# Patient Record
Sex: FEMALE | Race: ASIAN | NOT HISPANIC OR LATINO | Employment: FULL TIME | URBAN - METROPOLITAN AREA
[De-identification: names, ages, dates, MRNs, and addresses within clinical notes are randomized per-mention and may not be internally consistent; named-entity substitution may affect disease eponyms.]

---

## 2017-03-16 ENCOUNTER — HOSPITAL ENCOUNTER (EMERGENCY)
Facility: CLINIC | Age: 47
Discharge: HOME | End: 2017-03-16
Attending: EMERGENCY MEDICINE | Admitting: EMERGENCY MEDICINE
Payer: COMMERCIAL

## 2017-03-16 ENCOUNTER — APPOINTMENT (OUTPATIENT)
Dept: GENERAL RADIOLOGY | Facility: CLINIC | Age: 47
End: 2017-03-16
Attending: EMERGENCY MEDICINE
Payer: COMMERCIAL

## 2017-03-16 VITALS
BODY MASS INDEX: 26.66 KG/M2 | RESPIRATION RATE: 18 BRPM | WEIGHT: 160 LBS | SYSTOLIC BLOOD PRESSURE: 173 MMHG | TEMPERATURE: 98.1 F | HEIGHT: 65 IN | OXYGEN SATURATION: 99 % | DIASTOLIC BLOOD PRESSURE: 105 MMHG

## 2017-03-16 DIAGNOSIS — S92.512A CLOSED DISPLACED FRACTURE OF PROXIMAL PHALANX OF LESSER TOE OF LEFT FOOT, INITIAL ENCOUNTER: ICD-10-CM

## 2017-03-16 PROCEDURE — 73660 X-RAY EXAM OF TOE(S): CPT | Mod: LT

## 2017-03-16 PROCEDURE — 28515 TREATMENT OF TOE FRACTURE: CPT

## 2017-03-16 PROCEDURE — 99284 EMERGENCY DEPT VISIT MOD MDM: CPT | Mod: 25

## 2017-03-16 RX ORDER — BUPIVACAINE HYDROCHLORIDE 5 MG/ML
INJECTION, SOLUTION PERINEURAL
Status: DISCONTINUED
Start: 2017-03-16 | End: 2017-03-16 | Stop reason: HOSPADM

## 2017-03-16 RX ORDER — HYDROCODONE BITARTRATE AND ACETAMINOPHEN 5; 325 MG/1; MG/1
1 TABLET ORAL EVERY 6 HOURS PRN
Qty: 10 TABLET | Refills: 0 | Status: SHIPPED | OUTPATIENT
Start: 2017-03-16 | End: 2017-05-25

## 2017-03-16 ASSESSMENT — ENCOUNTER SYMPTOMS: NUMBNESS: 1

## 2017-03-16 NOTE — LETTER
[facility] EMERGENCY DEPARTMENT  [address]  [facility] [address]  [phone]    2017    Corinna Barker  [address] [facility] [id number] [phone] (home) none (work)    : 1970      To Whom it may concern:    Corinna Barker was seen in our Emergency Department today, 2017.  I expect her condition to improve over the next several weeks.  She may return to work 3/18/17 but she may require light duty that allows her to be off her feet for the next 2 weeks.    Sincerely,        Meme Stover

## 2017-03-16 NOTE — ED NOTES
Patient educated on narcotic pain medicine, Norco, as well as follow-up with [facility] as needed. Educated to not drive or operate equipment while taking this medication. Patient educated that medication can make them drowsy or impaired. Educated that pain medications can cause addiction and that opioids can cause constipation, and to drink plenty of fluids and consume fiber. Patient received discharge instructions and has no other questions at this time.

## 2017-03-16 NOTE — ED AVS SNAPSHOT
[facility] Emergency Department    [address]    [facility] [address]    Phone:  [phone]    Fax:  [phone]                                       Corinna Barker   MRN: [id number]    Department:  [facility] Emergency Department   Date of Visit:  3/16/2017           Patient Information     Date Of Birth          1970        Your diagnoses for this visit were:     Closed displaced fracture of proximal phalanx of lesser toe of left foot, initial encounter        You were seen by Meme Stover MD.      Follow-up Information     [facility]    Why:  As needed, If symptoms worsen    Contact information:    [address]  [address]  [facility] [address] [phone]        Follow up with [facility] Emergency Department.    Specialty:  EMERGENCY MEDICINE    Why:  As needed    Contact information:    [address] [facility] [facility] [id number] [phone]        Discharge Instructions           Finger or Toe Fractures (Broken Finger or Toe)  A hard blow can break a bone in your toe or finger. Broken bones are also known as fractures. Even minor fractures need medical care. Without treatment, they may heal crooked, remain stiff, or develop other problems.    When to go to the Emergency Room (ER)  You may not always know when you have a fractured toe or finger. Apply ice to the injury right away. Then, seek medical care if:    Your finger or toe is swollen or very painful.    You cannot move your finger or toe normally.    Your injured toe or finger is pale or blue.    You are bleeding.    A bone protrudes through your skin.  What to expect in the ER  A healthcare provider will ask about your injury and examine your toe or finger. You may have X-rays. Treatment will depend on the type of fracture you have.  Toe fracture  Your healthcare provider may straighten a broken toe.  You'll be given local anesthesia so you won't feel any discomfort during this procedure. Your injured toe may then be splinted by being taped to a toe next to it, or placed on a pad that's taped to your foot. Your healthcare provider may also ask you to apply ice and keep your foot elevated.  Opioid Medication Information    You have been given a prescription for an opioid (narcotic) pain medicine and/or have received a pain medicine while here in the Emergency Department. These medicines can make you drowsy or impaired. You must not drive, operate dangerous equipment, or engage in any other dangerous activities while taking these medications. If you drive while taking these medications, you could be arrested for DUI, or driving under the influence. Do not drink any alcohol while you are taking these medications.   Opioid pain medications can cause addiction. If you have a history of chemical dependency of any type, you are at a higher risk of becoming addicted to pain medications.  Only take these prescribed medications to treat your pain when all other options have been tried. Take it for as short a time and as few doses as possible. Store your pain pills in a secure place, as they are frequently stolen and provide a dangerous opportunity for children or visitors in your house to start abusing these powerful medications. We will not replace any lost or stolen medicine.  As soon as your pain is better, you should flush all your remaining medication.   Many prescription pain medications contain Tylenol  (acetaminophen), including Vicodin , Tylenol #3 , Norco , Lortab , and Percocet .  You should not take any extra pills of Tylenol  if you are using these prescription medications or you can get very sick.  Do not ever take more than 4000 mg of acetaminophen in any 24 hour period.  All opioids tend to cause constipation. Drink plenty of water and eat foods that have a lot of fiber, such as fruits, vegetables, prune  juice, apple juice and high fiber cereal.  Take a laxative if you don t move your bowels at least every other day. Miralax , Milk of Magnesia, Colace , or Senna  can be used to keep you regular.            24 Hour Appointment Hotline       To make an appointment at any [facility], call [phone] ([phone]). If you don't have a family doctor or clinic, we will help you find one. [facility] are conveniently located to serve the needs of you and your family.             Review of your medicines      START taking        Dose / Directions Last dose taken    HYDROcodone-acetaminophen 5-325 MG per tablet   Commonly known as:  NORCO   Dose:  1 tablet   Quantity:  10 tablet        Take 1 tablet by mouth every 6 hours as needed for pain   Refills:  0                Prescriptions were sent or printed at these locations (1 Prescription)                   Other Prescriptions                Printed at Department/Unit printer (1 of 1)         HYDROcodone-acetaminophen (NORCO) 5-325 MG per tablet                Procedures and tests performed during your visit     XR Toe Left G/E 2 Views      Orders Needing Specimen Collection     None      Pending Results     No orders found from 3/14/2017 to 3/17/2017.            Pending Culture Results     No orders found from 3/14/2017 to 3/17/2017.             Test Results from your hospital stay     3/16/2017  7:45 AM - Interface, Radiant Ib      Narrative     LEFT TOE TWO OR MORE VIEWS  3/16/2017 7:04 AM     HISTORY: Kicked a box, pain to fourth toe.    COMPARISON: None.        Impression     IMPRESSION: Transverse fracture of the proximal shaft of the proximal  phalanx of the fourth toe with lateral deviation of the distal  fracture fragment.    NADER SIMMONS MD                Clinical Quality Measure: Blood Pressure Screening     Your blood pressure was checked while you were in the emergency department today. The last reading we obtained was  BP: (!) 157/93 . Please  "read the guidelines below about what these numbers mean and what you should do about them.  If your systolic blood pressure (the top number) is less than 120 and your diastolic blood pressure (the bottom number) is less than 80, then your blood pressure is normal. There is nothing more that you need to do about it.  If your systolic blood pressure (the top number) is 120-139 or your diastolic blood pressure (the bottom number) is 80-89, your blood pressure may be higher than it should be. You should have your blood pressure rechecked within a year by a primary care provider.  If your systolic blood pressure (the top number) is 140 or greater or your diastolic blood pressure (the bottom number) is 90 or greater, you may have high blood pressure. High blood pressure is treatable, but if left untreated over time it can put you at risk for heart attack, stroke, or kidney failure. You should have your blood pressure rechecked by a primary care provider within the next 4 weeks.  If your provider in the emergency department today gave you specific instructions to follow-up with your doctor or provider even sooner than that, you should follow that instruction and not wait for up to 4 weeks for your follow-up visit.        Thank you for choosing [facility]       Thank you for choosing [facility] for your care. Our goal is always to provide you with excellent care. Hearing back from our patients is one way we can continue to improve our services. Please take a few minutes to complete the written survey that you may receive in the mail after you visit with us. Thank you!        [employer] Information     [employer] lets you send messages to your doctor, view your test results, renew your prescriptions, schedule appointments and more. To sign up, go to [email or web address] . Click on \"Log in\" on the left side of the screen, which will take you to the Welcome page. Then click on \"Sign up Now\" on the right side of the page.     You " will be asked to enter the access code listed below, as well as some personal information. Please follow the directions to create your username and password.     Your access code is: [email or web address]  Expires: 2017  8:27 AM     Your access code will  in 90 days. If you need help or a new code, please call your [facility] clinic or [phone].        Care EveryWhere ID     This is your Care EveryWhere ID. This could be used by other organizations to access your [facility] medical records  [id number]        After Visit Summary       This is your record. Keep this with you and show to your community pharmacist(s) and doctor(s) at your next visit.

## 2017-03-16 NOTE — DISCHARGE INSTRUCTIONS
Finger or Toe Fractures (Broken Finger or Toe)  A hard blow can break a bone in your toe or finger. Broken bones are also known as fractures. Even minor fractures need medical care. Without treatment, they may heal crooked, remain stiff, or develop other problems.    When to go to the Emergency Room (ER)  You may not always know when you have a fractured toe or finger. Apply ice to the injury right away. Then, seek medical care if:    Your finger or toe is swollen or very painful.    You cannot move your finger or toe normally.    Your injured toe or finger is pale or blue.    You are bleeding.    A bone protrudes through your skin.  What to expect in the ER  A healthcare provider will ask about your injury and examine your toe or finger. You may have X-rays. Treatment will depend on the type of fracture you have.  Toe fracture  Your healthcare provider may straighten a broken toe. You'll be given local anesthesia so you won't feel any discomfort during this procedure. Your injured toe may then be splinted by being taped to a toe next to it, or placed on a pad that's taped to your foot. Your healthcare provider may also ask you to apply ice and keep your foot elevated.  Opioid Medication Information    You have been given a prescription for an opioid (narcotic) pain medicine and/or have received a pain medicine while here in the Emergency Department. These medicines can make you drowsy or impaired. You must not drive, operate dangerous equipment, or engage in any other dangerous activities while taking these medications. If you drive while taking these medications, you could be arrested for DUI, or driving under the influence. Do not drink any alcohol while you are taking these medications.   Opioid pain medications can cause addiction. If you have a history of chemical dependency of any type, you are at a higher risk of becoming addicted to pain medications.  Only take these prescribed medications to treat your  pain when all other options have been tried. Take it for as short a time and as few doses as possible. Store your pain pills in a secure place, as they are frequently stolen and provide a dangerous opportunity for children or visitors in your house to start abusing these powerful medications. We will not replace any lost or stolen medicine.  As soon as your pain is better, you should flush all your remaining medication.   Many prescription pain medications contain Tylenol  (acetaminophen), including Vicodin , Tylenol #3 , Norco , Lortab , and Percocet .  You should not take any extra pills of Tylenol  if you are using these prescription medications or you can get very sick.  Do not ever take more than 4000 mg of acetaminophen in any 24 hour period.  All opioids tend to cause constipation. Drink plenty of water and eat foods that have a lot of fiber, such as fruits, vegetables, prune juice, apple juice and high fiber cereal.  Take a laxative if you don t move your bowels at least every other day. Miralax , Milk of Magnesia, Colace , or Senna  can be used to keep you regular.

## 2017-03-16 NOTE — ED AVS SNAPSHOT
[facility] Emergency Department    [address]    [facility] [address]    Phone:  [phone]    Fax:  [phone]                                       Corinna Barker   MRN: [id number]    Department:  [facility] Emergency Department   Date of Visit:  3/16/2017           After Visit Summary Signature Page     I have received my discharge instructions, and my questions have been answered. I have discussed any challenges I see with this plan with the nurse or doctor.    ..........................................................................................................................................  Patient/Patient Representative Signature      ..........................................................................................................................................  Patient Representative Print Name and Relationship to Patient    ..................................................               ................................................  Date                                            Time    ..........................................................................................................................................  Reviewed by Signature/Title    ...................................................              ..............................................  Date                                                            Time

## 2017-03-16 NOTE — ED PROVIDER NOTES
"  History     Chief Complaint:  Toe Pain    HPI   Corinna Barker is a 46 year old female who presents with moderate left fourth toe pain and deformity after kicking a box about two hours ago. Pain is worse with walking. She endorses subjective numbness in the painful toe but denies any tingling.     Allergies:  The patient has no known drug allergies.     Medications:    The patient is currently on no regular medications.       Past Medical History:    History reviewed.  No significant past medical history.      Past Surgical History:    History reviewed.  No significant past surgical history.      Family History:    History reviewed.  No significant family history.      Social History:  Relationship status:   Tobacco use: Positive   Alcohol use: Positive  The patient presents alone.     Review of Systems   Musculoskeletal:        Positive for toe pain and deformity.    Neurological: Positive for numbness.        Negative for paresthesias.    All other systems reviewed and are negative.    Physical Exam   First Vitals:  BP: (!) 157/93  Heart Rate: 85  Temp: 98.1  F (36.7  C)  Resp: 18  Height: 165.1 cm (5' 5\")  Weight: 72.6 kg (160 lb)  SpO2: 96 %    Physical Exam  General/Appearance: appears stated age, well-groomed, appears comfortable  MSK: limited mobility of L 4th toe, L 4th toe bent laterally at PIP joint, no ttp to surrounding toes or metatarsals  Skin: warm and well-perfused, no rash, no edema, no ecchymosis, nl turgor  Neuro: nl sensation to 4th L toe    Emergency Department Course   Imaging:  Radiographic findings were communicated with the patient who voiced understanding of the findings.    Left Toes XR, per radiology:   Transverse fracture of the proximal shaft of the proximal phalanx of the fourth toe with lateral deviation of the distal fracture fragment.     Procedures:   Toe Fracture Reduction     LOCATION:  Left fourth toe  ANESTHESIA: Digital block using 0.5% Bupivacaine  PROCEDURE NOTE: " Axial traction with medial force was applied with visibly improved alignment. The patient's toe was then buddy-taped. The patient tolerated the procedure well and there were no complications.    Emergency Department Course:  Nursing notes and vitals reviewed.  I performed an exam of the patient as documented above.  The above workup was undertaken.  0742: I rechecked the patient and discussed results.  0751: I numbed the patient's toe via digital block.   0817: I reduced the patient's toe and it was subsequently buddy-taped.   The patient was provided an ortho shoe and crutches for comfort.   Findings and plan explained to the Patient. Patient discharged home, status improved, with instructions regarding supportive care, medications, and reasons to return as well as the importance of close follow-up was reviewed.    Impression & Plan    Medical Decision Making:  Corinna Barker is a 46 year old female who stubbed her left fourth toe today. X-ray shows a fracture with slight displacement and lateral deviation. The toe was blocked and using traction and medial force was put in better alignment. It was then buddy-taped. She will be given an ortho shoe and crutches to be used as needed. Of note, there is no significant neurovascular compromise.      Diagnosis:    ICD-10-CM   1. Closed displaced fracture of proximal phalanx of lesser toe of left foot, initial encounter S92.512A     Disposition:  Discharge to home with primary care follow up.     Discharge Medications:  New Prescriptions    HYDROCODONE-ACETAMINOPHEN (NORCO) 5-325 MG PER TABLET    Take 1 tablet by mouth every 6 hours as needed for pain       Sally ASHLEY, agustín serving as a scribe on 3/16/2017 at 6:42 AM to personally document services performed by Meme Stover MD, based on my observations and the provider's statements to me.    [facility] EMERGENCY DEPARTMENT     Mmee Stover MD  03/16/17 0936

## 2017-03-16 NOTE — ED NOTES
MD made aware of blood pressure 173/105. Per MD, will consult patient to follow-up with elevated blood pressure. Ready for discharge.

## 2017-03-16 NOTE — ED NOTES
IN TRIAGE airway,breathing and circulation intact, without need for intervention . Alert and interacting appropriately for age and situation. 0430  Kicked box and left 4 toe very painful

## 2017-05-25 ENCOUNTER — OFFICE VISIT (OUTPATIENT)
Dept: FAMILY MEDICINE | Facility: CLINIC | Age: 47
End: 2017-05-25
Payer: COMMERCIAL

## 2017-05-25 VITALS
DIASTOLIC BLOOD PRESSURE: 100 MMHG | OXYGEN SATURATION: 99 % | BODY MASS INDEX: 27.77 KG/M2 | HEART RATE: 78 BPM | SYSTOLIC BLOOD PRESSURE: 142 MMHG | TEMPERATURE: 98.1 F | WEIGHT: 166.9 LBS

## 2017-05-25 DIAGNOSIS — I10 ESSENTIAL HYPERTENSION: ICD-10-CM

## 2017-05-25 DIAGNOSIS — H81.12 BPPV (BENIGN PAROXYSMAL POSITIONAL VERTIGO), LEFT: Primary | ICD-10-CM

## 2017-05-25 LAB
ANION GAP SERPL CALCULATED.3IONS-SCNC: 6 MMOL/L (ref 3–14)
BUN SERPL-MCNC: 14 MG/DL (ref 7–30)
CALCIUM SERPL-MCNC: 8.9 MG/DL (ref 8.5–10.1)
CHLORIDE SERPL-SCNC: 107 MMOL/L (ref 94–109)
CO2 SERPL-SCNC: 27 MMOL/L (ref 20–32)
CREAT SERPL-MCNC: 0.58 MG/DL (ref 0.52–1.04)
GFR SERPL CREATININE-BSD FRML MDRD: ABNORMAL ML/MIN/1.7M2
GLUCOSE SERPL-MCNC: 102 MG/DL (ref 70–99)
POTASSIUM SERPL-SCNC: 4.1 MMOL/L (ref 3.4–5.3)
SODIUM SERPL-SCNC: 140 MMOL/L (ref 133–144)

## 2017-05-25 PROCEDURE — 80048 BASIC METABOLIC PNL TOTAL CA: CPT | Performed by: NURSE PRACTITIONER

## 2017-05-25 PROCEDURE — 99203 OFFICE O/P NEW LOW 30 MIN: CPT | Performed by: NURSE PRACTITIONER

## 2017-05-25 PROCEDURE — 36415 COLL VENOUS BLD VENIPUNCTURE: CPT | Performed by: NURSE PRACTITIONER

## 2017-05-25 RX ORDER — HYDROCHLOROTHIAZIDE 12.5 MG/1
12.5 TABLET ORAL DAILY
Qty: 30 TABLET | Refills: 1 | Status: SHIPPED | OUTPATIENT
Start: 2017-05-25 | End: 2017-07-10

## 2017-05-25 NOTE — PATIENT INSTRUCTIONS
Benign Paroxysmal Positional Vertigo  Benign paroxysmal positional vertigo (BPPV) is a problem with the inner ear. The inner ear contains the vestibular system. This system is what helps you keep your balance. BPPV causes a feeling of spinning. It is a common problem of the vestibular system.  Understanding the vestibular system  The vestibular system of the ear is made up of very tiny parts. They include the utricle, saccule, and semicircular canals. The utricle is a tiny organ that contains calcium crystals. In some people, the crystals can move into the semicircular canals. When this happens, the system no longer works as it should. This causes BPPV. Benign means it is not life-threatening. Paroxysmal means it happens suddenly. Positional means that it happens when you move your head. Vertigo is a feeling of spinning.  What causes BPPV?  Causes include injury to your head or neck. Other problems with the vestibular system may cause BPPV. In many people, the cause of BPPV is not known.  Symptoms of BPPV  You many have repeated feelings of spinning (vertigo). The vertigo usually lasts less than 1 minute. Some movements, suchas rolling over in bed, can bring on vertigo.  Diagnosing BPPV  Your primary health care provider may diagnose and treat your BPPV. Or you may see an ear, nose, and throat doctor (otolaryngologist). In some cases, you may see a nervous system doctor (neurologist).  The health care provider will ask about your symptoms and your medical history. He or she will examine you. You may have hearing and balance tests. As part of the exam, your health care provider may have you move your head and body in certain ways. If you have BPPV, the movements can bring on vertigo. Your provider will also look for abnormal movements of your eyes. You may have other tests to check your vestibular or nervous systems.  Treatment for BPPV  Your health care provider may try to move the calcium crystals. This is done  by having you move your head and neck in certain ways. This treatment is safe and often works well. You may also be told to do these movements at home. You may still have vertigo for a few weeks. Your health care provider will recheck your symptoms, usually in about a month. Special physical therapy may also be part of treatment.  In rare cases surgery may be needed for BPPV that does not go away.     When to call the health care provider  Call your health care provider right away if you have any of these:    Symptoms that do not go away with treatment    Symptoms that get worse    New symptoms        2655-1296 [employer]. [address] [place] [id number]. All rights reserved. This information is not intended as a substitute for professional medical care. Always follow your healthcare professional's instructions.    Start the medication for blood pressure now.  Return to clinic in 1 month for a lab draw and blood pressure recheck with the nurse.    Schedule an appointment with the dizziness and balance center.

## 2017-05-25 NOTE — MR AVS SNAPSHOT
After Visit Summary   5/25/2017    Corinna Barker    MRN: [id number]           Patient Information     Date Of Birth          1970        Visit Information        Provider Department      5/25/2017 8:00 AM Shannan Farias Ra, APRN [facility]        Today's Diagnoses     Essential hypertension    -  1    BPPV (benign paroxysmal positional vertigo), left          Care Instructions      Benign Paroxysmal Positional Vertigo  Benign paroxysmal positional vertigo (BPPV) is a problem with the inner ear. The inner ear contains the vestibular system. This system is what helps you keep your balance. BPPV causes a feeling of spinning. It is a common problem of the vestibular system.  Understanding the vestibular system  The vestibular system of the ear is made up of very tiny parts. They include the utricle, saccule, and semicircular canals. The utricle is a tiny organ that contains calcium crystals. In some people, the crystals can move into the semicircular canals. When this happens, the system no longer works as it should. This causes BPPV. Benign means it is not life-threatening. Paroxysmal means it happens suddenly. Positional means that it happens when you move your head. Vertigo is a feeling of spinning.  What causes BPPV?  Causes include injury to your head or neck. Other problems with the vestibular system may cause BPPV. In many people, the cause of BPPV is not known.  Symptoms of BPPV  You many have repeated feelings of spinning (vertigo). The vertigo usually lasts less than 1 minute. Some movements, suchas rolling over in bed, can bring on vertigo.  Diagnosing BPPV  Your primary health care provider may diagnose and treat your BPPV. Or you may see an ear, nose, and throat doctor (otolaryngologist). In some cases, you may see a nervous system doctor (neurologist).  The health care provider will ask about your symptoms and your medical history. He or she will examine you. You  may have hearing and balance tests. As part of the exam, your health care provider may have you move your head and body in certain ways. If you have BPPV, the movements can bring on vertigo. Your provider will also look for abnormal movements of your eyes. You may have other tests to check your vestibular or nervous systems.  Treatment for BPPV  Your health care provider may try to move the calcium crystals. This is done by having you move your head and neck in certain ways. This treatment is safe and often works well. You may also be told to do these movements at home. You may still have vertigo for a few weeks. Your health care provider will recheck your symptoms, usually in about a month. Special physical therapy may also be part of treatment.  In rare cases surgery may be needed for BPPV that does not go away.     When to call the health care provider  Call your health care provider right away if you have any of these:    Symptoms that do not go away with treatment    Symptoms that get worse    New symptoms        [id number] [email or web address]. [address] [place] [address]. All rights reserved. This information is not intended as a substitute for professional medical care. Always follow your healthcare professional's instructions.    Start the medication for blood pressure now.  Return to clinic in 1 month for a lab draw and blood pressure recheck with the nurse.    Schedule an appointment with the dizziness and balance center.                Follow-ups after your visit        Additional Services     PHYSICAL THERAPY REFERRAL       *This therapy referral will be filtered to a centralized scheduling office at [facility] and the patient will receive a call to schedule an appointment at a [place] location most convenient for them. *     [facility] provides Physical Therapy evaluation and treatment and many specialty services across the [facility].  " If requesting a specialty program, please choose from the list below.    If you have not heard from the scheduling office within 2 business days, please call [phone] for all locations, with the exception of [place], please call [phone].  Treatment: Evaluation & Treatment  Special Instructions/Modalities:   Special Programs: Balance/Vestibular    Please be aware that coverage of these services is subject to the terms and limitations of your health insurance plan.  Call member services at your health plan with any benefit or coverage questions.      **Note to Provider:  If you are referring outside of [place] for the therapy appointment, please list the name of the location in the \"special instructions\" above, print the referral and give to the patient to schedule the appointment.                  Future tests that were ordered for you today     Open Future Orders        Priority Expected Expires Ordered    Basic metabolic panel Routine  5/25/2018 5/25/2017            Who to contact     If you have questions or need follow up information about today's clinic visit or your schedule please contact [facility] directly at [phone].  Normal or non-critical lab and imaging results will be communicated to you by MyChart, letter or phone within 4 business days after the clinic has received the results. If you do not hear from us within 7 days, please contact the clinic through [employer] or phone. If you have a critical or abnormal lab result, we will notify you by phone as soon as possible.  Submit refill requests through [employer] or call your pharmacy and they will forward the refill request to us. Please allow 3 business days for your refill to be completed.          Additional Information About Your Visit        [employer] Information     [employer] lets you send messages to your doctor, view your test results, renew your prescriptions, schedule appointments and more. To sign up, go to " "[email or web address] . Click on \"Log in\" on the left side of the screen, which will take you to the Welcome page. Then click on \"Sign up Now\" on the right side of the page.     You will be asked to enter the access code listed below, as well as some personal information. Please follow the directions to create your username and password.     Your access code is: [email or web address]  Expires: 2017  8:27 AM     Your access code will  in 90 days. If you need help or a new code, please call your [place] clinic or [phone].        Care EveryWhere ID     This is your Care EveryWhere ID. This could be used by other organizations to access your [place] medical records  [id number]        Your Vitals Were     Pulse Temperature Pulse Oximetry BMI (Body Mass Index)          78 98.1  F (36.7  C) (Oral) 99% 27.77 kg/m2         Blood Pressure from Last 3 Encounters:   17 (!) 142/100   17 (!) 173/105    Weight from Last 3 Encounters:   17 166 lb 14.4 oz (75.7 kg)   17 160 lb (72.6 kg)              We Performed the Following     Basic metabolic panel     PHYSICAL THERAPY REFERRAL          Today's Medication Changes          These changes are accurate as of: 17  8:32 AM.  If you have any questions, ask your nurse or doctor.               Start taking these medicines.        Dose/Directions    hydrochlorothiazide 12.5 MG Tabs tablet   Used for:  Essential hypertension   Started by:  Shannan Farias Ra, APRN CNP        Dose:  12.5 mg   Take 1 tablet (12.5 mg) by mouth daily   Quantity:  30 tablet   Refills:  1            Where to get your medicines      These medications were sent to [facility]/pharmacy #[id number] - [place] - [address]  [address] [place] [address]     Phone:  [phone]     hydrochlorothiazide 12.5 MG Tabs tablet                Primary Care Provider    Physician No Ref-Primary       No address on file        Thank you!     Thank you for choosing [facility] " [facility]  for your care. Our goal is always to provide you with excellent care. Hearing back from our patients is one way we can continue to improve our services. Please take a few minutes to complete the written survey that you may receive in the mail after your visit with us. Thank you!             Your Updated Medication List - Protect others around you: Learn how to safely use, store and throw away your medicines at www.disposemymeds.org.          This list is accurate as of: 5/25/17  8:32 AM.  Always use your most recent med list.                   Brand Name Dispense Instructions for use    hydrochlorothiazide 12.5 MG Tabs tablet     30 tablet    Take 1 tablet (12.5 mg) by mouth daily

## 2017-05-25 NOTE — LETTER
[facility]  [address] [address] [phone]          May 26, 2017    Corinna Barker                                                                                                                     [id number]  [id number]            Dear Corinna,    The result of your initial lab test looks good.  Nothing abnormal.  We can continue with the plan to recheck in 1 month.  Please let me know if you have any questions or concerns.    Thank you,    Sincerely,         Shannan LOPEZ

## 2017-05-25 NOTE — PROGRESS NOTES
SUBJECTIVE:                                                    Corinna Barker is a 46 year old female who presents to clinic today for the following health issues:      Dizziness      Duration: 5 days    Description   Feeling faint:  no   Feeling like the surroundings are moving: YES  Loss of consciousness or falls: no     Intensity:  moderate    Accompanying signs and symptoms:   Nausea/vomitting: YES  Headaches: Yes  Palpitations: no   Weakness in arms or legs: no   Vision or speech changes: no   Ringing in ears (Tinnitus): no   Hearing loss related to dizziness: no   Other (fevers/chills/sweating/dyspnea): no     History (similar episodes/head trauma/previous evaluation/recent bleeding): None    Precipitating or alleviating factors (new meds/chemicals): None  Worse with activity/head movement: YES- Head feels heavy    Therapies tried and outcome: None     Symptoms started 5 days ago.  Occurs when she changes positions, side to side or bending over.  Room doesn't spin but it does quiver.  Lasts for about 20 seconds.  Seems a bit better today but still foggy.    No fevers.  No head trauma.  No home meds.  No URI symptoms.  No chest pain, palpitations, sob.  Normal intake.  Normal output.    She smokes cigarettes.  Occasional etoh use.  No exercise.    She was treated about 5 years ago for htn, took med for about 3 years then stopped independently.  No monitoring since that time.    Problem list and histories reviewed & adjusted, as indicated.  Additional history: as documented    There is no problem list on file for this patient.    History reviewed. No pertinent surgical history.    Social History   Substance Use Topics     Smoking status: Current Every Day Smoker     Types: Cigarettes     Smokeless tobacco: Not on file      Comment: 1 pack a week     Alcohol use Yes     Family History   Problem Relation Age of Onset     Adopted: Yes           Reviewed and updated as needed this visit by clinical  staff  Tobacco  Allergies  Meds  Med Hx  Surg Hx  Fam Hx  Soc Hx      Reviewed and updated as needed this visit by Provider         ROS:  SEE HPI.    OBJECTIVE:                                                    BP (!) 142/100  Pulse 78  Temp 98.1  F (36.7  C) (Oral)  Wt 166 lb 14.4 oz (75.7 kg)  SpO2 99%  BMI 27.77 kg/m2  Body mass index is 27.77 kg/(m^2).  GENERAL: healthy, alert and no distress  EYES: Eyes grossly normal to inspection, PERRL and conjunctivae and sclerae normal  HENT: ear canals and TM's normal, nose and mouth without ulcers or lesions  NECK: no adenopathy, no asymmetry, masses, or scars and thyroid normal to palpation  RESP: lungs clear to auscultation - no rales, rhonchi or wheezes  BREAST: normal without masses, tenderness or nipple discharge and no palpable axillary masses or adenopathy  CV: regular rate and rhythm, normal S1 S2, no S3 or S4, no murmur, click or rub, no peripheral edema and peripheral pulses strong  ABDOMEN: soft, nontender, no hepatosplenomegaly, no masses and bowel sounds normal  MS: no gross musculoskeletal defects noted, no edema  SKIN: no suspicious lesions or rashes  NEURO: Normal strength and tone, sensory exam grossly normal, mentation intact, cranial nerves 2-12 intact and rapid alternating movements normal  PSYCH: mentation appears normal, affect normal/bright    Diagnostic Test Results:  none      ASSESSMENT/PLAN:                                                    1. BPPV (benign paroxysmal positional vertigo), left  46 y.o. Female, recent onset dizziness, seem to be improving slightly.  C/w BPPV.  Monitor, see PT for repositioning maneuvers.  Pt agrees with plan and verbalized understanding.  - PHYSICAL THERAPY REFERRAL    2. Essential hypertension  Asymptomatic.  Restart med.  Return to clinic in 1 month for nurse bp check and lab only.  Pt agrees with plan and verbalized understanding.  - hydrochlorothiazide 12.5 MG TABS tablet; Take 1 tablet (12.5 mg)  by mouth daily  Dispense: 30 tablet; Refill: 1  - Basic metabolic panel  - Basic metabolic panel; Future    RICHARD Marks Ra, CNP  [facility]

## 2017-05-25 NOTE — NURSING NOTE
"Chief Complaint   Patient presents with     Hypertension       Initial BP (!) 142/100  Pulse 78  Temp 98.1  F (36.7  C) (Oral)  Wt 166 lb 14.4 oz (75.7 kg)  SpO2 99%  BMI 27.77 kg/m2 Estimated body mass index is 27.77 kg/(m^2) as calculated from the following:    Height as of 3/16/17: 5' 5\" (1.651 m).    Weight as of this encounter: 166 lb 14.4 oz (75.7 kg).  Medication Reconciliation: complete   Renita SHIRLEY M.A.      "

## 2017-06-05 ENCOUNTER — HOSPITAL ENCOUNTER (OUTPATIENT)
Dept: PHYSICAL THERAPY | Facility: CLINIC | Age: 47
Setting detail: THERAPIES SERIES
End: 2017-06-05
Attending: NURSE PRACTITIONER
Payer: COMMERCIAL

## 2017-06-05 PROCEDURE — 40000840 ZZHC STATISTIC PT VESTIBULAR VISIT: Performed by: PHYSICAL THERAPIST

## 2017-06-05 PROCEDURE — 95992 CANALITH REPOSITIONING PROC: CPT | Mod: GP | Performed by: PHYSICAL THERAPIST

## 2017-06-05 PROCEDURE — 97161 PT EVAL LOW COMPLEX 20 MIN: CPT | Mod: GP | Performed by: PHYSICAL THERAPIST

## 2017-06-05 PROCEDURE — 97112 NEUROMUSCULAR REEDUCATION: CPT | Mod: GP | Performed by: PHYSICAL THERAPIST

## 2017-06-05 NOTE — PROGRESS NOTES
06/05/17 1500   Quick Adds   Type of Visit Initial OP PT Evaluation   General Information   Start of Care Date 06/05/17   Referring Physician RICHARD Marks, CNP   Orders Evaluate and Treat as Indicated   Order Date 05/25/17   Medical Diagnosis BPPV   Onset of illness/injury or Date of Surgery 05/20/17   Precautions/Limitations no known precautions/limitations   Surgical/Medical history reviewed Yes   Pertinent history of current problem (include personal factors and/or comorbidities that impact the POC) Pt presents to PT to address symptoms of intermittent vertigo that first began on 5/20/17 when she was bending forward to place a towel over her hair after getting out of the shower.  She has had several transient bouts of vertigo since then, especially when she lies in bed and rolls to her L side.  She denies any head trauma, recent illness, no hearing loss, did have L ear tinnitus that lasted several days but now resolved, no change in vision, no pressure in the ears.  No previous hx of vertigo reported.  Denies any other neurological changes.   Pertinent Visual History  wears corrective glasses   Prior level of function comment indep and active PLOF.  Pt works in a machine shop.   Patient role/Employment history Employed   Living environment [place]   Home/Community Accessibility Comments Pt lives in a multi-level home, no issues on stairs reported   Assistive Devices Comments no AD use   Patient/Family Goals Statement Resolve dizziness   Fall Risk Screen   Fall screen completed by PT   Per patient - Fall 2 or more times in past year? No   Per patient - Fall with injury in past year? No   Is patient a fall risk? No   System Outcome Measures   Outcome Measures BPPV   Dizziness Handicap Inventory (score out of 100) A decrease in score by 17.18 or greater indicates a clinically significant change in symptoms. 16   Was BPPV resolved at end of session? No   Pain   Patient currently in pain No    Cognitive Status Examination   Orientation orientation to person, place and time   Integumentary   Integumentary No deficits were identified   Posture   Posture Forward head position   Range of Motion (ROM)   ROM Comment WNL BUEs BLEs and C-spine   Strength   Strength Comments WFL and symmetrical with UE and LE myotomal screen   Bed Mobility   Bed Mobility Comments Indep, pt endorses vertigo with lying supine and head turned L   Transfer Skills   Transfer Comments Indep, steady on feet, no AD   Gait   Gait Comments INdep, steady gait, no AD.  Normal gait speed, reciprocal gait pattern.  Good ability to change speeds and navigate around obstacles.   Balance   Balance Comments Good functional balance, able to navigate in busy environments with gait skills.  No difficulty with NBOS or EC conditions.   Balance Special Tests   Balance Special Tests Modified CTSIB Conditions   Balance Special Tests Modified CTSIB Conditions   Condition 1, seconds 30 Seconds   Condition 2, seconds 30 Seconds   Condition 4, seconds 30 Seconds   Condition 5, seconds 30 Seconds   Sensory Examination   Sensory Perception no deficits were identified   Coordination   Coordination no deficits were identified   Oculomotor Exam   Smooth Pursuit Normal   Saccades Normal   VOR Normal   Rapid Head Thrust Normal   Convergence Testing Normal   Infrared Goggle Exam or Frenzel Lense Exam   Vestibular Suppressant in Last 24 Hours? No   Exam completed with Room Light  (IR goggles stopped functioning during eval)   Spontaneous Nystagmus Negative   Gaze Evoked Nystagmus Negative   Head Shake Horizontal Nystagmus Negative   [place]-Hallpike (right) Negative   [place]-Hallpike (Left) Upbeating L torsional   HSCC Supine Roll Test (Right) Negative   HSCC Supine Roll Test (Left) Negative   HSCC Supine Roll Test (Left) Comments  no nystagmus noted, but pt endorses mild vertigo   BPPV Canal(s) L Posterior   BPPV Type Canalithasis   Planned Therapy Interventions   Planned  Therapy Interventions neuromuscular re-education;gait training;other (see comments)  (CRM and VOR training as indicated)   Clinical Impression   Criteria for Skilled Therapeutic Interventions Met yes, treatment indicated   PT Diagnosis L posterior canal BPPV   Influenced by the following impairments Intermittent vertigo   Functional limitations due to impairments Impaired tolerance with rolling supine <> sidelying in bed, transitions supine <> sit, impaired ability to look up overhead and bend over to floor   Clinical Presentation Stable/Uncomplicated   Clinical Presentation Rationale classic BPPV, indep PLOF, intensity of symptoms   Clinical Decision Making (Complexity) Low complexity   Therapy Frequency other (see comments)  (2x/wk x 1 wk due to pt's schedule, then 1x/wk)   Predicted Duration of Therapy Intervention (days/wks) 4 wks   Risk & Benefits of therapy have been explained Yes   Patient, Family & other staff in agreement with plan of care Yes   Clinical Impression Comments Classic BPPV presentation, anticipate good improvement in symptoms with skilled intervention for CRM.   Education Assessment   Barriers to Learning No barriers   GOALS   PT Eval Goals 1;2;3   Goal 1   Goal Identifier IR Exam   Goal Description Pt will demonstrate negative s/s of BPPV with IR exam to indicate resolution of current BPPV episode and resume of PLOF.   Target Date 07/07/17   Goal 2   Goal Identifier DHI   Goal Description Pt will score <5/100 on the DHI to indicate improved dizziness symptoms and minimal/no impact on daily activities at home and with work (baseline score 16/100).   Target Date 07/07/17   Goal 3   Goal Identifier HEP   Goal Description Pt will verbalize/demonstrate understanding of self CRM if needed for future recurrence of BPPV symptoms in order to try managing symptoms at home before needing clinical intervention.   Target Date 07/07/17   Total Evaluation Time   Total Evaluation Time (Minutes) 30

## 2017-06-07 ENCOUNTER — HOSPITAL ENCOUNTER (OUTPATIENT)
Dept: PHYSICAL THERAPY | Facility: CLINIC | Age: 47
Setting detail: THERAPIES SERIES
End: 2017-06-07
Attending: NURSE PRACTITIONER
Payer: COMMERCIAL

## 2017-06-07 PROCEDURE — 40000840 ZZHC STATISTIC PT VESTIBULAR VISIT: Performed by: PHYSICAL THERAPIST

## 2017-06-07 PROCEDURE — 97112 NEUROMUSCULAR REEDUCATION: CPT | Mod: GP | Performed by: PHYSICAL THERAPIST

## 2017-06-07 PROCEDURE — 95992 CANALITH REPOSITIONING PROC: CPT | Mod: GP | Performed by: PHYSICAL THERAPIST

## 2017-06-26 DIAGNOSIS — I10 ESSENTIAL HYPERTENSION: ICD-10-CM

## 2017-06-26 PROCEDURE — 80048 BASIC METABOLIC PNL TOTAL CA: CPT | Performed by: NURSE PRACTITIONER

## 2017-06-26 PROCEDURE — 36415 COLL VENOUS BLD VENIPUNCTURE: CPT | Performed by: NURSE PRACTITIONER

## 2017-06-27 LAB
ANION GAP SERPL CALCULATED.3IONS-SCNC: 6 MMOL/L (ref 3–14)
BUN SERPL-MCNC: 7 MG/DL (ref 7–30)
CALCIUM SERPL-MCNC: 9.2 MG/DL (ref 8.5–10.1)
CHLORIDE SERPL-SCNC: 102 MMOL/L (ref 94–109)
CO2 SERPL-SCNC: 29 MMOL/L (ref 20–32)
CREAT SERPL-MCNC: 0.53 MG/DL (ref 0.52–1.04)
GFR SERPL CREATININE-BSD FRML MDRD: ABNORMAL ML/MIN/1.7M2
GLUCOSE SERPL-MCNC: 103 MG/DL (ref 70–99)
POTASSIUM SERPL-SCNC: 3.5 MMOL/L (ref 3.4–5.3)
SODIUM SERPL-SCNC: 137 MMOL/L (ref 133–144)

## 2017-07-10 ENCOUNTER — ALLIED HEALTH/NURSE VISIT (OUTPATIENT)
Dept: NURSING | Facility: CLINIC | Age: 47
End: 2017-07-10
Payer: COMMERCIAL

## 2017-07-10 VITALS — SYSTOLIC BLOOD PRESSURE: 144 MMHG | DIASTOLIC BLOOD PRESSURE: 86 MMHG | HEART RATE: 82 BPM

## 2017-07-10 DIAGNOSIS — I10 ESSENTIAL HYPERTENSION: ICD-10-CM

## 2017-07-10 PROCEDURE — 99207 ZZC NO CHARGE NURSE ONLY: CPT

## 2017-07-10 RX ORDER — HYDROCHLOROTHIAZIDE 12.5 MG/1
25 TABLET ORAL DAILY
Qty: 30 TABLET | Refills: 1 | COMMUNITY
Start: 2017-07-10 | End: 2017-07-13

## 2017-07-10 NOTE — PROGRESS NOTES
In for blood pressure check.    Today's reading: /82  Pulse 82     History   Smoking Status     Current Every Day Smoker     Types: Cigarettes   Smokeless Tobacco     Not on file     Comment: 1 pack a week       Current Outpatient Prescriptions   Medication Sig     hydrochlorothiazide 12.5 MG TABS tablet Take 1 tablet (12.5 mg) by mouth daily     No current facility-administered medications for this visit.         She is having her blood pressure monitored because it has been mildly elevated and has been on medication.    Corinna has had the following symptoms: none      HUDDLED WITH CRISTINA ODONNELL AND ADVISED PT TO INCREASE HER DOSE TO 2 TABS DAILY AND FOLLOW UP IN 2 WEEKS. WILL HAVE LAB AND FOLLOW UP WITH CRISTINA.  PT DOES NOT NEED TO BE FASTING.       PT EXPRESSED UNDERSTANDING AND WILL COUNT HER PILLS TO MAKE SURE SHE HAS ENOUGH LEFT AND WILL LET US KNOW IF SHE IS GOING TO BE SHORT SO WE CAN SEND IN EXTRA MEDICATION IF NEEDED.

## 2017-07-10 NOTE — MR AVS SNAPSHOT
"              After Visit Summary   7/10/2017    Corinna Barker    MRN: [id number]           Patient Information     Date Of Birth          1970        Visit Information        Provider Department      7/10/2017 8:30 AM  NURSE [facility]         Follow-ups after your visit        Your next 10 appointments already scheduled     2017  2:40 PM CDT   [facility]ARD [facility], C[facility]    [address] [facility] [address] [address] [phone]              Who to contact     If you have questions or need follow up information about today's clinic visit or your schedule please contact [facility] directly at [phone].  Normal or non-critical lab and imaging results will be communicated to you by MyChart, letter or phone within 4 business days after the clinic has received the results. If you do not hear from us within 7 days, please contact the clinic through MyChart or phone. If you have a critical or abnormal lab result, we will notify you by phone as soon as possible.  Submit refill requests through Eachpal or call your pharmacy and they will forward the refill request to us. Please allow 3 business days for your refill to be completed.          Additional Information About Your Visit        [email or web address] Information     Eachpal lets you send messages to your doctor, view your test results, renew your prescriptions, schedule appointments and more. To sign up, go to [email or web address] . Click on \"Log in\" on the left side of the screen, which will take you to the Welcome page. Then click on \"Sign up Now\" on the right side of the page.     You will be asked to enter the access code listed below, as well as some personal information. Please follow the directions to create your username and password.     Your access code is: [email or web address]  Expires: 10/8/2017  8:40 AM     Your access code will  in 90 " days. If you need help or a new code, please call your [place] clinic or [phone].        Care EveryWhere ID     This is your Care EveryWhere ID. This could be used by other organizations to access your [place] medical records  [id number]        Your Vitals Were     Pulse                   82            Blood Pressure from Last 3 Encounters:   07/10/17 144/86   05/25/17 (!) 142/100   03/16/17 (!) 173/105    Weight from Last 3 Encounters:   05/25/17 166 lb 14.4 oz (75.7 kg)   03/16/17 160 lb (72.6 kg)              Today, you had the following     No orders found for display       Primary Care Provider    Physician No Ref-Primary       No address on file        Equal Access to Services     RAFAEL LERMA : Hadii bruce doyleo Padmini, waaxda luqadaha, qaybta kaalmada adeclarayada, hoda de la torre . So [facility] [phone].    ATENCIÓN: Si habla español, tiene a darby disposición servicios gratuitos de asistencia lingüística. Llame al [phone].    We comply with applicable federal civil rights laws and [place] laws. We do not discriminate on the basis of race, color, national origin, age, disability sex, sexual orientation or gender identity.            Thank you!     Thank you for choosing [facility]  for your care. Our goal is always to provide you with excellent care. Hearing back from our patients is one way we can continue to improve our services. Please take a few minutes to complete the written survey that you may receive in the mail after your visit with us. Thank you!             Your Updated Medication List - Protect others around you: Learn how to safely use, store and throw away your medicines at www.disposemymeds.org.          This list is accurate as of: 7/10/17  8:40 AM.  Always use your most recent med list.                   Brand Name Dispense Instructions for use Diagnosis    hydrochlorothiazide 12.5 MG Tabs tablet     30 tablet    Take 1 tablet (12.5 mg)  by mouth daily    Essential hypertension

## 2017-07-10 NOTE — NURSING NOTE
"Chief Complaint   Patient presents with     Hypertension     was placed on medication about 6 weeks ago       Initial /82  Pulse 82 Estimated body mass index is 27.77 kg/(m^2) as calculated from the following:    Height as of 3/16/17: 5' 5\" (1.651 m).    Weight as of 5/25/17: 166 lb 14.4 oz (75.7 kg).  Medication Reconciliation: complete    "

## 2017-07-13 ENCOUNTER — TELEPHONE (OUTPATIENT)
Dept: FAMILY MEDICINE | Facility: CLINIC | Age: 47
End: 2017-07-13

## 2017-07-13 DIAGNOSIS — I10 ESSENTIAL HYPERTENSION: ICD-10-CM

## 2017-07-13 RX ORDER — HYDROCHLOROTHIAZIDE 12.5 MG/1
25 TABLET ORAL DAILY
Qty: 60 TABLET | Refills: 0 | Status: SHIPPED | OUTPATIENT
Start: 2017-07-13 | End: 2017-07-24

## 2017-07-13 NOTE — TELEPHONE ENCOUNTER
Patient called stating would be short of HCTZ before scheduled appt. Verbal ok to send prescription to pharmacy per YURY.    Urmila Beckett RN

## 2017-07-19 DIAGNOSIS — I10 ESSENTIAL HYPERTENSION: ICD-10-CM

## 2017-07-19 NOTE — TELEPHONE ENCOUNTER
DUPLICATE.     hydrochlorothiazide 12.5 MG TABS tablet WAS FILLED ON 7/13/2017, QTY 60 WITH 0 REFILL.

## 2017-07-20 RX ORDER — HYDROCHLOROTHIAZIDE 12.5 MG/1
TABLET ORAL
Qty: 30 TABLET | Refills: 1
Start: 2017-07-20

## 2017-07-24 ENCOUNTER — OFFICE VISIT (OUTPATIENT)
Dept: FAMILY MEDICINE | Facility: CLINIC | Age: 47
End: 2017-07-24
Payer: COMMERCIAL

## 2017-07-24 VITALS
TEMPERATURE: 98.6 F | WEIGHT: 167.6 LBS | HEART RATE: 99 BPM | BODY MASS INDEX: 27.89 KG/M2 | OXYGEN SATURATION: 98 % | DIASTOLIC BLOOD PRESSURE: 100 MMHG | SYSTOLIC BLOOD PRESSURE: 146 MMHG

## 2017-07-24 DIAGNOSIS — Z72.0 TOBACCO ABUSE: ICD-10-CM

## 2017-07-24 DIAGNOSIS — I10 ESSENTIAL HYPERTENSION: Primary | ICD-10-CM

## 2017-07-24 DIAGNOSIS — Z23 NEED FOR TDAP VACCINATION: ICD-10-CM

## 2017-07-24 PROCEDURE — 90471 IMMUNIZATION ADMIN: CPT | Performed by: NURSE PRACTITIONER

## 2017-07-24 PROCEDURE — 90715 TDAP VACCINE 7 YRS/> IM: CPT | Performed by: NURSE PRACTITIONER

## 2017-07-24 PROCEDURE — 99213 OFFICE O/P EST LOW 20 MIN: CPT | Mod: 25 | Performed by: NURSE PRACTITIONER

## 2017-07-24 PROCEDURE — 99207 ZZC NO CHARGE NURSE ONLY: CPT | Performed by: NURSE PRACTITIONER

## 2017-07-24 RX ORDER — LISINOPRIL 20 MG/1
20 TABLET ORAL DAILY
Qty: 30 TABLET | Refills: 1 | Status: SHIPPED | OUTPATIENT
Start: 2017-07-24 | End: 2017-09-24

## 2017-07-24 NOTE — PROGRESS NOTES
SUBJECTIVE:                                                    Corinna Barker is a 46 year old female who presents to clinic today for the following health issues:      Hypertension Follow-up      Outpatient blood pressures are not being checked.    Low Salt Diet: no added salt        Amount of exercise or physical activity: None    Problems taking medications regularly: No    Medication side effects: none  Diet: regular (no restrictions)  Smokes 1 pack per week cigarettes, not ready to quit.    Pt presents for follow up.  She is taking 25mg of hctz without problem.  Compliant.  She denies any chest pain, palpitations, sob.  No LE edema.  No headaches or vision change.    Problem list and histories reviewed & adjusted, as indicated.  Additional history: as documented    Patient Active Problem List   Diagnosis     Essential hypertension     History reviewed. No pertinent surgical history.    Social History   Substance Use Topics     Smoking status: Current Every Day Smoker     Types: Cigarettes     Smokeless tobacco: Not on file      Comment: 1 pack a week     Alcohol use Yes     Family History   Problem Relation Age of Onset     Adopted: Yes             Reviewed and updated as needed this visit by clinical staffAllergies  Med Hx  Surg Hx  Fam Hx  Soc Hx      Reviewed and updated as needed this visit by Provider         ROS:  SEE HPI.    OBJECTIVE:     BP (!) 146/100  Pulse 99  Temp 98.6  F (37  C) (Oral)  Wt 167 lb 9.6 oz (76 kg)  SpO2 98%  BMI 27.89 kg/m2  Body mass index is 27.89 kg/(m^2).  GENERAL: healthy, alert and no distress  RESP: lungs clear to auscultation - no rales, rhonchi or wheezes  CV: regular rate and rhythm, normal S1 S2, no S3 or S4, no murmur, click or rub, no peripheral edema and peripheral pulses strong  PSYCH: mentation appears normal, affect normal/bright    Diagnostic Test Results:  none     ASSESSMENT/PLAN:   1. Essential hypertension  Hx of htn, no response to hctz.  Discussed  med change.  Start lisinopril 20mg daily.  Stop hctz.  If needed, will add amlodipine.  She will rtc for a nurse only bp check in 2 weeks, repeat BMP at that point.  Pt agrees with plan and verbalized understanding.  - lisinopril (PRINIVIL/ZESTRIL) 20 MG tablet; Take 1 tablet (20 mg) by mouth daily  Dispense: 30 tablet; Refill: 1  - **Basic metabolic panel FUTURE anytime; Future    2. Tobacco abuse  Not ready to quit.  Will let us know when she is ready.    [facility]  FCO signed for pap, reports normal 2 years ago.    Shannan Farias, APRN CNP  [facility]

## 2017-07-24 NOTE — MR AVS SNAPSHOT
"              After Visit Summary   7/24/2017    Corinna Barker    MRN: [id number]           Patient Information     Date Of Birth          1970        Visit Information        Provider Department      7/24/2017 2:40 PM Shannan Farias Ra, APRN CNP [facility]        Today's Diagnoses     Essential hypertension    -  1      Care Instructions    Start the new medication (lisinopril).  Stop the hydrochlorothiazide.    Return to clinic in 2 weeks for a blood pressure recheck.  We will check a lab at that time as well.  We will then talk about if there is a change needed.          Follow-ups after your visit        Future tests that were ordered for you today     Open Future Orders        Priority Expected Expires Ordered    **Basic metabolic panel FUTURE anytime Routine 7/24/2017 7/24/2018 7/24/2017            Who to contact     If you have questions or need follow up information about today's clinic visit or your schedule please contact [facility] directly at [phone].  Normal or non-critical lab and imaging results will be communicated to you by Refresh Bodyhart, letter or phone within 4 business days after the clinic has received the results. If you do not hear from us within 7 days, please contact the clinic through Refresh Bodyhart or phone. If you have a critical or abnormal lab result, we will notify you by phone as soon as possible.  Submit refill requests through [employer] or call your pharmacy and they will forward the refill request to us. Please allow 3 business days for your refill to be completed.          Additional Information About Your Visit        Refresh Bodyhart Information     [employer] lets you send messages to your doctor, view your test results, renew your prescriptions, schedule appointments and more. To sign up, go to [email or web address] . Click on \"Log in\" on the left side of the screen, which will take you to the Welcome page. Then click on \"Sign up Now\" on the right side " of the page.     You will be asked to enter the access code listed below, as well as some personal information. Please follow the directions to create your username and password.     Your access code is: [email or web address]  Expires: 10/8/2017  8:40 AM     Your access code will  in 90 days. If you need help or a new code, please call your [place] clinic or [phone].        Care EveryWhere ID     This is your Care EveryWhere ID. This could be used by other organizations to access your [place] medical records  [id number]        Your Vitals Were     Pulse Temperature Pulse Oximetry BMI (Body Mass Index)          99 98.6  F (37  C) (Oral) 98% 27.89 kg/m2         Blood Pressure from Last 3 Encounters:   17 (!) 146/100   07/10/17 144/86   17 (!) 142/100    Weight from Last 3 Encounters:   17 167 lb 9.6 oz (76 kg)   17 166 lb 14.4 oz (75.7 kg)   17 160 lb (72.6 kg)                 Today's Medication Changes          These changes are accurate as of: 17  3:10 PM.  If you have any questions, ask your nurse or doctor.               Start taking these medicines.        Dose/Directions    lisinopril 20 MG tablet   Commonly known as:  PRINIVIL/ZESTRIL   Used for:  Essential hypertension   Started by:  Shannan Farias Ra, APRN CNP        Dose:  20 mg   Take 1 tablet (20 mg) by mouth daily   Quantity:  30 tablet   Refills:  1         Stop taking these medicines if you haven't already. Please contact your care team if you have questions.     hydrochlorothiazide 12.5 MG Tabs tablet   Stopped by:  Shannan Farias Ra, APRN CNP                Where to get your medicines      These medications were sent to [facility]/pharmacy #[id number] - [place] - [address]  [address] [place] [address]     Phone:  [phone]     lisinopril 20 MG tablet                Primary Care Provider    Physician No Ref-Primary       No address on file        Equal Access to Services     RAFAEL LERMA AH: Keron barrera  rachelle [place], wabahmanda lusriniadaha, qayaata kagamal sethi, hoda erain hayaarobert colonclara thibodeaux laallenrobert viviana. So [place] -[phone].    ATENCIÓN: Si habla español, tiene a darby disposición servicios gratuitos de asistencia lingüística. Llame al [phone].    We comply with applicable federal civil rights laws and [place] laws. We do not discriminate on the basis of race, color, national origin, age, disability sex, sexual orientation or gender identity.            Thank you!     Thank you for choosing [facility]  for your care. Our goal is always to provide you with excellent care. Hearing back from our patients is one way we can continue to improve our services. Please take a few minutes to complete the written survey that you may receive in the mail after your visit with us. Thank you!             Your Updated Medication List - Protect others around you: Learn how to safely use, store and throw away your medicines at www.disposemymeds.org.          This list is accurate as of: 7/24/17  3:10 PM.  Always use your most recent med list.                   Brand Name Dispense Instructions for use Diagnosis    lisinopril 20 MG tablet    PRINIVIL/ZESTRIL    30 tablet    Take 1 tablet (20 mg) by mouth daily    Essential hypertension

## 2017-07-24 NOTE — NURSING NOTE
"Chief Complaint   Patient presents with     Hypertension       Initial BP (!) 146/100  Pulse 99  Temp 98.6  F (37  C) (Oral)  Wt 167 lb 9.6 oz (76 kg)  SpO2 98%  BMI 27.89 kg/m2 Estimated body mass index is 27.89 kg/(m^2) as calculated from the following:    Height as of 3/16/17: 5' 5\" (1.651 m).    Weight as of this encounter: 167 lb 9.6 oz (76 kg).  Medication Reconciliation: complete   Renita SHIRLEY M.A.    Screening Questionnaire for Adult Immunization    Are you sick today?   No   Do you have allergies to medications, food, a vaccine component or latex?   Yes   Have you ever had a serious reaction after receiving a vaccination?   No   Do you have a long-term health problem with heart disease, lung disease, asthma, kidney disease, metabolic disease (e.g. diabetes), anemia, or other blood disorder?   No   Do you have cancer, leukemia, HIV/AIDS, or any other immune system problem?   No   In the past 3 months, have you taken medications that affect  your immune system, such as prednisone, other steroids, or anticancer drugs; drugs for the treatment of rheumatoid arthritis, Crohn s disease, or psoriasis; or have you had radiation treatments?   No   Have you had a seizure, or a brain or other nervous system problem?   No   During the past year, have you received a transfusion of blood or blood     products, or been given immune (gamma) globulin or antiviral drug?   No   For women: Are you pregnant or is there a chance you could become        pregnant during the next month?   No   Have you received any vaccinations in the past 4 weeks?   No     Immunization questionnaire answers were all negative.      MNVFC doesn't apply on this patient       Screening performed by Princess Renteria on 7/24/2017 at 3:52 PM.    "

## 2017-07-24 NOTE — PATIENT INSTRUCTIONS
Start the new medication (lisinopril).  Stop the hydrochlorothiazide.    Return to clinic in 2 weeks for a blood pressure recheck.  We will check a lab at that time as well.  We will then talk about if there is a change needed.

## 2017-08-09 DIAGNOSIS — I10 ESSENTIAL HYPERTENSION: ICD-10-CM

## 2017-08-10 RX ORDER — HYDROCHLOROTHIAZIDE 12.5 MG/1
TABLET ORAL
Qty: 60 TABLET | Refills: 0 | OUTPATIENT
Start: 2017-08-10

## 2017-08-10 NOTE — TELEPHONE ENCOUNTER
hydrochlorothiazide 12.5 MG TABS tablet       Last Written Prescription Date: 7/13/2017  Last Fill Quantity: 60, # refills: 0  Last Office Visit with G, [facility] or [facility] prescribing provider: 7/24/2017       Potassium   Date Value Ref Range Status   06/26/2017 3.5 3.4 - 5.3 mmol/L Final     Creatinine   Date Value Ref Range Status   06/26/2017 0.53 0.52 - 1.04 mg/dL Final     BP Readings from Last 3 Encounters:   07/24/17 (!) 146/100   07/10/17 144/86   05/25/17 (!) 142/100

## 2017-08-10 NOTE — TELEPHONE ENCOUNTER
Routing refill request to provider for review/approval because:  Drug not active on patient's medication list, BP elevated.  Please advise.  Neris Mir, RN  Triage Nurse

## 2017-08-30 DIAGNOSIS — I10 ESSENTIAL HYPERTENSION: ICD-10-CM

## 2017-08-31 RX ORDER — HYDROCHLOROTHIAZIDE 12.5 MG/1
TABLET ORAL
Qty: 60 TABLET | Refills: 0 | OUTPATIENT
Start: 2017-08-31

## 2017-08-31 NOTE — TELEPHONE ENCOUNTER
8/10/17 encounter:       This was discontinued at last visit.  YURY                 Disp Refills Start End SANDIP   hydrochlorothiazide 12.5 MG TABS tablet (Discontinued) 60 tablet 0 7/13/2017

## 2017-09-28 ENCOUNTER — DOCUMENTATION ONLY (OUTPATIENT)
Dept: LAB | Facility: CLINIC | Age: 47
End: 2017-09-28

## 2017-09-28 DIAGNOSIS — Z13.220 LIPID SCREENING: Primary | ICD-10-CM

## 2017-10-02 ENCOUNTER — ALLIED HEALTH/NURSE VISIT (OUTPATIENT)
Dept: NURSING | Facility: CLINIC | Age: 47
End: 2017-10-02
Payer: COMMERCIAL

## 2017-10-02 VITALS — DIASTOLIC BLOOD PRESSURE: 92 MMHG | SYSTOLIC BLOOD PRESSURE: 142 MMHG | HEART RATE: 80 BPM

## 2017-10-02 DIAGNOSIS — I10 ESSENTIAL HYPERTENSION: ICD-10-CM

## 2017-10-02 DIAGNOSIS — Z01.30 BP CHECK: Primary | ICD-10-CM

## 2017-10-02 PROCEDURE — 80048 BASIC METABOLIC PNL TOTAL CA: CPT | Performed by: NURSE PRACTITIONER

## 2017-10-02 PROCEDURE — 36415 COLL VENOUS BLD VENIPUNCTURE: CPT | Performed by: NURSE PRACTITIONER

## 2017-10-02 PROCEDURE — 99207 ZZC NO CHARGE NURSE ONLY: CPT

## 2017-10-02 RX ORDER — AMLODIPINE BESYLATE 5 MG/1
5 TABLET ORAL DAILY
Qty: 30 TABLET | Refills: 1 | Status: SHIPPED | OUTPATIENT
Start: 2017-10-02 | End: 2017-10-18

## 2017-10-02 NOTE — NURSING NOTE
Corinna Barker is a 47 year old female who comes in today for a Blood Pressure check because of new medication and ongoing blood pressure monitoring.    Vitals as recorded, a large cuff was used.    Patient is taking medication as prescribed  Patient is tolerating medications well.  Patient is not monitoring Blood Pressure at home.      Current complaints: none    Disposition: results routed to MD/PILAR Hoskins MA

## 2017-10-03 LAB
ANION GAP SERPL CALCULATED.3IONS-SCNC: 12 MMOL/L (ref 3–14)
BUN SERPL-MCNC: 9 MG/DL (ref 7–30)
CALCIUM SERPL-MCNC: 8.4 MG/DL (ref 8.5–10.1)
CHLORIDE SERPL-SCNC: 106 MMOL/L (ref 94–109)
CO2 SERPL-SCNC: 20 MMOL/L (ref 20–32)
CREAT SERPL-MCNC: 0.63 MG/DL (ref 0.52–1.04)
GFR SERPL CREATININE-BSD FRML MDRD: >90 ML/MIN/1.7M2
GLUCOSE SERPL-MCNC: 107 MG/DL (ref 70–99)
POTASSIUM SERPL-SCNC: 3.5 MMOL/L (ref 3.4–5.3)
SODIUM SERPL-SCNC: 138 MMOL/L (ref 133–144)

## 2017-10-17 ENCOUNTER — ALLIED HEALTH/NURSE VISIT (OUTPATIENT)
Dept: NURSING | Facility: CLINIC | Age: 47
End: 2017-10-17
Payer: COMMERCIAL

## 2017-10-17 VITALS — SYSTOLIC BLOOD PRESSURE: 148 MMHG | HEART RATE: 82 BPM | DIASTOLIC BLOOD PRESSURE: 98 MMHG

## 2017-10-17 DIAGNOSIS — Z01.30 BP CHECK: Primary | ICD-10-CM

## 2017-10-17 PROCEDURE — 99207 ZZC NO CHARGE NURSE ONLY: CPT

## 2017-10-17 NOTE — NURSING NOTE
Corinna Barker is a 47 year old female who comes in today for a Blood Pressure check because of new medication and ongoing blood pressure monitoring.    Vitals as recorded, a regular cuff was used.    Patient is taking medication as prescribed  Patient is tolerating medications well.  Patient is not monitoring Blood Pressure at home.     Current complaints: none    Huddled with Imani Bonilla regarding patients BP. Provider stated that pt should make an appt with PCP. Pt has appt scheduled with Shannan Farias for 10/18/17.     Disposition: results routed to Shannan Hoskins MA

## 2017-10-17 NOTE — MR AVS SNAPSHOT
"              After Visit Summary   10/17/2017    Corinna Barker    MRN: [id number]           Patient Information     Date Of Birth          1970        Visit Information        Provider Department      10/17/2017 3:00 PM RM NURSE [facility]        Today's Diagnoses     BP check    -  1       Follow-ups after your visit        Your next 10 appointments already scheduled     Oct 18, 2017 10:20 AM CDT   SHORT with RICHARD Marks Ra, CNP   [facility] ([facility]    [address] [address] [phone]              Who to contact     If you have questions or need follow up information about today's clinic visit or your schedule please contact [facility] directly at [phone].  Normal or non-critical lab and imaging results will be communicated to you by MyChart, letter or phone within 4 business days after the clinic has received the results. If you do not hear from us within 7 days, please contact the clinic through MyChart or phone. If you have a critical or abnormal lab result, we will notify you by phone as soon as possible.  Submit refill requests through Journeys or call your pharmacy and they will forward the refill request to us. Please allow 3 business days for your refill to be completed.          Additional Information About Your Visit        MyChart Information     Journeys lets you send messages to your doctor, view your test results, renew your prescriptions, schedule appointments and more. To sign up, go to [email or web address] . Click on \"Log in\" on the left side of the screen, which will take you to the Welcome page. Then click on \"Sign up Now\" on the right side of the page.     You will be asked to enter the access code listed below, as well as some personal information. Please follow the directions to create your username and password.     Your access code is: [email or web address]  Expires: 1/15/2018  " 3:16 PM     Your access code will  in 90 days. If you need help or a new code, please call your [place] clinic or [phone].        Care EveryWhere ID     This is your Care EveryWhere ID. This could be used by other organizations to access your [place] medical records  [id number]        Your Vitals Were     Pulse                   82            Blood Pressure from Last 3 Encounters:   10/17/17 (!) 148/98   10/02/17 (!) 142/92   17 (!) 146/100    Weight from Last 3 Encounters:   17 167 lb 9.6 oz (76 kg)   17 166 lb 14.4 oz (75.7 kg)   17 160 lb (72.6 kg)              Today, you had the following     No orders found for display       Primary Care Provider    Physician No Ref-Primary       NO REF-PRIMARY PHYSICIAN        Equal Access to Services     RAFAEL LERMA : Hadii aad rachelle hadasho Sochris, waaxda luqadaha, qaybta kaalmada navdeep, hoda de la torre . So [facility] [phone].    ATENCIÓN: Si habla español, tiene a darby disposición servicios gratuitos de asistencia lingüística. Kelsie al [phone].    We comply with applicable federal civil rights laws and [place] laws. We do not discriminate on the basis of race, color, national origin, age, disability, sex, sexual orientation, or gender identity.            Thank you!     Thank you for choosing [facility]  for your care. Our goal is always to provide you with excellent care. Hearing back from our patients is one way we can continue to improve our services. Please take a few minutes to complete the written survey that you may receive in the mail after your visit with us. Thank you!             Your Updated Medication List - Protect others around you: Learn how to safely use, store and throw away your medicines at www.disposemymeds.org.          This list is accurate as of: 10/17/17  3:16 PM.  Always use your most recent med list.                   Brand Name Dispense Instructions for use  Diagnosis    amLODIPine 5 MG tablet    NORVASC    30 tablet    Take 1 tablet (5 mg) by mouth daily    Essential hypertension       lisinopril 20 MG tablet    PRINIVIL/ZESTRIL    30 tablet    TAKE 1 TABLET (20 MG) BY MOUTH DAILY    Essential hypertension

## 2017-10-18 ENCOUNTER — OFFICE VISIT (OUTPATIENT)
Dept: FAMILY MEDICINE | Facility: CLINIC | Age: 47
End: 2017-10-18
Payer: COMMERCIAL

## 2017-10-18 VITALS
HEART RATE: 88 BPM | OXYGEN SATURATION: 99 % | TEMPERATURE: 98.2 F | DIASTOLIC BLOOD PRESSURE: 84 MMHG | WEIGHT: 164.9 LBS | SYSTOLIC BLOOD PRESSURE: 138 MMHG | BODY MASS INDEX: 27.44 KG/M2

## 2017-10-18 DIAGNOSIS — I10 ESSENTIAL HYPERTENSION: ICD-10-CM

## 2017-10-18 PROCEDURE — 99213 OFFICE O/P EST LOW 20 MIN: CPT | Performed by: NURSE PRACTITIONER

## 2017-10-18 RX ORDER — AMLODIPINE BESYLATE 5 MG/1
5 TABLET ORAL DAILY
Qty: 90 TABLET | Refills: 0 | Status: SHIPPED | OUTPATIENT
Start: 2017-10-18 | End: 2018-02-24

## 2017-10-18 RX ORDER — LISINOPRIL 20 MG/1
TABLET ORAL
Qty: 90 TABLET | Refills: 0 | Status: SHIPPED | OUTPATIENT
Start: 2017-10-18 | End: 2018-01-27

## 2017-10-18 NOTE — PATIENT INSTRUCTIONS
Stop in for a blood pressure check with the nurse in about 6-8 weeks.  I refilled your medications.

## 2017-10-18 NOTE — PROGRESS NOTES
SUBJECTIVE:   Corinna Barker is a 47 year old female who presents to clinic today for the following health issues:      Hypertension Follow-up      Outpatient blood pressures are being checked at clinic.  Results are 148/98.    Low Salt Diet: not monitoring salt        Amount of exercise or physical activity: None    Problems taking medications regularly: No    Medication side effects: none  Diet: regular (no restrictions)    Pt presents for recheck.  Compliant with bp meds.  Tolerating well.  No SEs.  No chest pain, palpitations, sob.  No LE edema.    Problem list and histories reviewed & adjusted, as indicated.  Additional history: as documented    Patient Active Problem List   Diagnosis     Essential hypertension     History reviewed. No pertinent surgical history.    Social History   Substance Use Topics     Smoking status: Current Every Day Smoker     Types: Cigarettes     Smokeless tobacco: Not on file      Comment: 1 pack a week     Alcohol use Yes     Family History   Problem Relation Age of Onset     Adopted: Yes             Reviewed and updated as needed this visit by clinical staffAllergies  Meds  Med Hx  Surg Hx  Fam Hx  Soc Hx      Reviewed and updated as needed this visit by Provider         ROS:  SEE HPI.    OBJECTIVE:     /84  Pulse 88  Temp 98.2  F (36.8  C) (Oral)  Wt 164 lb 14.4 oz (74.8 kg)  SpO2 99%  BMI 27.44 kg/m2  Body mass index is 27.44 kg/(m^2).  GENERAL: healthy, alert and no distress  RESP: lungs clear to auscultation - no rales, rhonchi or wheezes  CV: regular rate and rhythm, normal S1 S2, no S3 or S4, no murmur, click or rub, no peripheral edema and peripheral pulses strong  PSYCH: mentation appears normal, affect normal/bright    Diagnostic Test Results:  none     ASSESSMENT/PLAN:   1. Essential hypertension  47 y.o. Female, here for bp recheck.  Monitor over the next 1-2 months.  Continue at current doses.  Pt agrees with plan and verbalized understanding.  -  amLODIPine (NORVASC) 5 MG tablet; Take 1 tablet (5 mg) by mouth daily  Dispense: 90 tablet; Refill: 0  - lisinopril (PRINIVIL/ZESTRIL) 20 MG tablet; TAKE 1 TABLET (20 MG) BY MOUTH DAILY  Dispense: 90 tablet; Refill: 0    RICHARD Marks Ra [facility]

## 2017-10-18 NOTE — MR AVS SNAPSHOT
"              After Visit Summary   10/18/2017    Corinna Barker    MRN: [id number]           Patient Information     Date Of Birth          1970        Visit Information        Provider Department      10/18/2017 10:20 AM Shannan Farias Ra, APRN CNP [facility]        Today's Diagnoses     Essential hypertension          Care Instructions    Stop in for a blood pressure check with the nurse in about 6-8 weeks.  I refilled your medications.          Follow-ups after your visit        Who to contact     If you have questions or need follow up information about today's clinic visit or your schedule please contact [facility] directly at [phone].  Normal or non-critical lab and imaging results will be communicated to you by MyChart, letter or phone within 4 business days after the clinic has received the results. If you do not hear from us within 7 days, please contact the clinic through [employer] or phone. If you have a critical or abnormal lab result, we will notify you by phone as soon as possible.  Submit refill requests through britebill or call your pharmacy and they will forward the refill request to us. Please allow 3 business days for your refill to be completed.          Additional Information About Your Visit        MyChart Information     britebill lets you send messages to your doctor, view your test results, renew your prescriptions, schedule appointments and more. To sign up, go to [email or web address] . Click on \"Log in\" on the left side of the screen, which will take you to the Welcome page. Then click on \"Sign up Now\" on the right side of the page.     You will be asked to enter the access code listed below, as well as some personal information. Please follow the directions to create your username and password.     Your access code is: [email or web address]  Expires: 1/15/2018  3:16 PM     Your access code will  in 90 days. If you need help or a new code, please " call your [facility] clinic or [phone].        Care EveryWhere ID     This is your Care EveryWhere ID. This could be used by other organizations to access your [facility] medical records  [id number]        Your Vitals Were     Pulse Temperature Pulse Oximetry BMI (Body Mass Index)          88 98.2  F (36.8  C) (Oral) 99% 27.44 kg/m2         Blood Pressure from Last 3 Encounters:   10/18/17 138/84   10/17/17 (!) 148/98   10/02/17 (!) 142/92    Weight from Last 3 Encounters:   10/18/17 164 lb 14.4 oz (74.8 kg)   07/24/17 167 lb 9.6 oz (76 kg)   05/25/17 166 lb 14.4 oz (75.7 kg)              Today, you had the following     No orders found for display         Today's Medication Changes          These changes are accurate as of: 10/18/17 10:49 AM.  If you have any questions, ask your nurse or doctor.               These medicines have changed or have updated prescriptions.        Dose/Directions    lisinopril 20 MG tablet   Commonly known as:  PRINIVIL/ZESTRIL   This may have changed:  See the new instructions.   Used for:  Essential hypertension   Changed by:  Shannan Farias Ra, APRN CNP        TAKE 1 TABLET (20 MG) BY MOUTH DAILY   Quantity:  90 tablet   Refills:  0            Where to get your medicines      These medications were sent to [facility]/pharmacy #[id number] - [place], [place] - [address]  [address] [place] [address]     Phone:  [phone]     amLODIPine 5 MG tablet    lisinopril 20 MG tablet                Primary Care Provider    Physician No Ref-Primary       NO REF-PRIMARY PHYSICIAN        Equal Access to Services     [facility] AH: Hadii bruce ku hadasho Sochris, waaxda luqadaha, qaybta kaalmada hoda sethi. So [facility] [phone].    ATENCIÓN: Si habla español, tiene a darby disposición servicios gratuitos de asistencia lingüística. Llame al [phone].    We comply with applicable federal civil rights laws and [place] laws. We do not discriminate on the  basis of race, color, national origin, age, disability, sex, sexual orientation, or gender identity.            Thank you!     Thank you for choosing [facility]  for your care. Our goal is always to provide you with excellent care. Hearing back from our patients is one way we can continue to improve our services. Please take a few minutes to complete the written survey that you may receive in the mail after your visit with us. Thank you!             Your Updated Medication List - Protect others around you: Learn how to safely use, store and throw away your medicines at www.disposemymeds.org.          This list is accurate as of: 10/18/17 10:49 AM.  Always use your most recent med list.                   Brand Name Dispense Instructions for use Diagnosis    amLODIPine 5 MG tablet    NORVASC    90 tablet    Take 1 tablet (5 mg) by mouth daily    Essential hypertension       lisinopril 20 MG tablet    PRINIVIL/ZESTRIL    90 tablet    TAKE 1 TABLET (20 MG) BY MOUTH DAILY    Essential hypertension

## 2017-10-18 NOTE — NURSING NOTE
"Chief Complaint   Patient presents with     Hypertension       Initial /84  Pulse 88  Temp 98.2  F (36.8  C) (Oral)  Wt 164 lb 14.4 oz (74.8 kg)  SpO2 99%  BMI 27.44 kg/m2 Estimated body mass index is 27.44 kg/(m^2) as calculated from the following:    Height as of 3/16/17: 5' 5\" (1.651 m).    Weight as of this encounter: 164 lb 14.4 oz (74.8 kg).  Medication Reconciliation: complete   Renita SHIRLEY M.A.      "

## 2017-10-26 DIAGNOSIS — I10 ESSENTIAL HYPERTENSION: ICD-10-CM

## 2017-10-27 RX ORDER — LISINOPRIL 20 MG/1
TABLET ORAL
Qty: 30 TABLET | Refills: 0
Start: 2017-10-27

## 2018-02-02 ENCOUNTER — ALLIED HEALTH/NURSE VISIT (OUTPATIENT)
Dept: NURSING | Facility: CLINIC | Age: 48
End: 2018-02-02
Payer: COMMERCIAL

## 2018-02-02 VITALS — DIASTOLIC BLOOD PRESSURE: 80 MMHG | HEART RATE: 85 BPM | SYSTOLIC BLOOD PRESSURE: 124 MMHG

## 2018-02-02 DIAGNOSIS — I10 ESSENTIAL HYPERTENSION: Primary | ICD-10-CM

## 2018-02-02 PROCEDURE — 99207 ZZC NO CHARGE NURSE ONLY: CPT

## 2018-02-02 NOTE — MR AVS SNAPSHOT
"              After Visit Summary   2018    Corinna Barker    MRN: [id number]           Patient Information     Date Of Birth          1970        Visit Information        Provider Department      2018 8:30 AM  NURSE [facility]        Today's Diagnoses     Essential hypertension    -  1       Follow-ups after your visit        Who to contact     If you have questions or need follow up information about today's clinic visit or your schedule please contact [facility] directly at [phone].  Normal or non-critical lab and imaging results will be communicated to you by [employer], letter or phone within 4 business days after the clinic has received the results. If you do not hear from us within 7 days, please contact the clinic through [employer] or phone. If you have a critical or abnormal lab result, we will notify you by phone as soon as possible.  Submit refill requests through [employer] or call your pharmacy and they will forward the refill request to us. Please allow 3 business days for your refill to be completed.          Additional Information About Your Visit        MyChart Information     [employer] lets you send messages to your doctor, view your test results, renew your prescriptions, schedule appointments and more. To sign up, go to [email or web address] . Click on \"Log in\" on the left side of the screen, which will take you to the Welcome page. Then click on \"Sign up Now\" on the right side of the page.     You will be asked to enter the access code listed below, as well as some personal information. Please follow the directions to create your username and password.     Your access code is: [email or web address]  Expires: 5/3/2018  8:37 AM     Your access code will  in 90 days. If you need help or a new code, please call your [facility] or [phone].        Care EveryWhere ID     This is your Care EveryWhere ID. This could be used by other organizations to " access your [place] medical records  [id number]        Your Vitals Were     Pulse                   85            Blood Pressure from Last 3 Encounters:   02/02/18 124/80   10/18/17 138/84   10/17/17 (!) 148/98    Weight from Last 3 Encounters:   10/18/17 164 lb 14.4 oz (74.8 kg)   07/24/17 167 lb 9.6 oz (76 kg)   05/25/17 166 lb 14.4 oz (75.7 kg)              Today, you had the following     No orders found for display       Primary Care Provider Office Phone # Fax #    Shannan Farias, RICHARD [facility] [phone]       [address] [facility] [address]        Equal Access to Services     RAFAEL LERMA : Hadii aad ku hadasho Soomaali, waaxda luqadaha, qaybta kaalmada adeegyada, waxdutch girardin hayfroylan de la torre . So [facility] [phone].    ATENCIÓN: Si habla español, tiene a darby disposición servicios gratuitos de asistencia lingüística. Llame al [phone].    We comply with applicable federal civil rights laws and [place] laws. We do not discriminate on the basis of race, color, national origin, age, disability, sex, sexual orientation, or gender identity.            Thank you!     Thank you for choosing [facility]  for your care. Our goal is always to provide you with excellent care. Hearing back from our patients is one way we can continue to improve our services. Please take a few minutes to complete the written survey that you may receive in the mail after your visit with us. Thank you!             Your Updated Medication List - Protect others around you: Learn how to safely use, store and throw away your medicines at www.disposemymeds.org.          This list is accurate as of 2/2/18  8:37 AM.  Always use your most recent med list.                   Brand Name Dispense Instructions for use Diagnosis    amLODIPine 5 MG tablet    NORVASC    90 tablet    Take 1 tablet (5 mg) by mouth daily    Essential hypertension       lisinopril 20 MG tablet    PRINIVIL/ZESTRIL    30  tablet    TAKE 1 TABLET (20 MG) BY MOUTH DAILY    Essential hypertension

## 2018-02-02 NOTE — PROGRESS NOTES
Corinna Barker is a 47 year old female who comes in today for a Blood Pressure check because of ongoing blood pressure monitoring.    *Document pulse and BP  *Use new set of vitals button for multiple readings.  *Use extended vitals for orthostatic    Vitals as recorded, a large cuff was used.    Patient is taking medication as prescribed  Patient is tolerating medications well.  Patient is not monitoring Blood Pressure at home.      Current complaints: none    Disposition: results routed to MD/AP

## 2018-02-03 DIAGNOSIS — I10 ESSENTIAL HYPERTENSION: ICD-10-CM

## 2018-02-05 NOTE — TELEPHONE ENCOUNTER
"Requested Prescriptions   Pending Prescriptions Disp Refills     lisinopril (PRINIVIL/ZESTRIL) 20 MG tablet [Pharmacy Med Name: LISINOPRIL 20 MG TABLET]  Last Written Prescription Date:  2/1/18  Last Fill Quantity: 30,  # refills: 0   Last Office Visit with FM provider:  10/18/2017     Future Office Visit:      90 tablet 0     Sig: TAKE 1 TABLET (20 MG) BY MOUTH DAILY    ACE Inhibitors (Including Combos) Protocol Passed    2/3/2018 11:48 AM       Passed - Blood pressure under 140/90    BP Readings from Last 3 Encounters:   02/02/18 124/80   10/18/17 138/84   10/17/17 (!) 148/98                Passed - Recent or future visit with authorizing provider's specialty    Patient had office visit in the last year or has a visit in the next 30 days with authorizing provider.  See \"Patient Info\" tab in inbasket, or \"Choose Columns\" in Meds & Orders section of the refill encounter.            Passed - Patient is age 18 or older       Passed - No active pregnancy on record       Passed - Normal serum creatinine on file in past 12 months    Recent Labs   Lab Test  10/02/17   1457   CR  0.63            Passed - Normal serum potassium on file in past 12 months    Recent Labs   Lab Test  10/02/17   1457   POTASSIUM  3.5            Passed - No positive pregnancy test in past 12 months          "

## 2018-02-06 RX ORDER — LISINOPRIL 20 MG/1
TABLET ORAL
Qty: 90 TABLET | Refills: 1 | Status: SHIPPED | OUTPATIENT
Start: 2018-02-06 | End: 2018-08-24

## 2018-02-06 NOTE — TELEPHONE ENCOUNTER
BP at goal. Patient was in clinic for Nurse only BP      Prescription approved per [facility] Refill Protocol.    Evelyn VALENZUELA RN, BSN, PHN  [facility]

## 2018-02-24 DIAGNOSIS — I10 ESSENTIAL HYPERTENSION: ICD-10-CM

## 2018-02-26 NOTE — TELEPHONE ENCOUNTER
"Requested Prescriptions   Pending Prescriptions Disp Refills     amLODIPine (NORVASC) 5 MG tablet [Pharmacy Med Name: AMLODIPINE BESYLATE 5 MG TAB]  Last Written Prescription Date:  10/18/17  Last Fill Quantity: 90,  # refills: 0   Last office visit: 10/18/2017 with prescribing provider:  10/18/2017     Future Office Visit:     90 tablet 0     Sig: TAKE 1 TABLET (5 MG) BY MOUTH DAILY    Calcium Channel Blockers Protocol  Failed    2/24/2018  5:25 AM       Failed - No positive pregnancy test in past 12 months       Passed - Blood pressure under 140/90 in past 12 months    BP Readings from Last 3 Encounters:   02/02/18 124/80   10/18/17 138/84   10/17/17 (!) 148/98                Passed - Recent or future visit with authorizing provider    Patient had office visit in the last year or has a visit in the next 30 days with authorizing provider.  See \"Patient Info\" tab in inbasket, or \"Choose Columns\" in Meds & Orders section of the refill encounter.            Passed - Patient is age 18 or older       Passed - No active pregnancy on record       Passed - Normal serum creatinine on file in past 12 months    Recent Labs   Lab Test  10/02/17   1457   CR  0.63               "

## 2018-02-27 RX ORDER — AMLODIPINE BESYLATE 5 MG/1
TABLET ORAL
Qty: 90 TABLET | Refills: 0 | Status: SHIPPED | OUTPATIENT
Start: 2018-02-27 | End: 2018-05-28

## 2018-02-27 NOTE — TELEPHONE ENCOUNTER
Prescription approved per [facility] Refill Protocol.  Елена Awad RN

## 2018-02-28 DIAGNOSIS — I10 ESSENTIAL HYPERTENSION: ICD-10-CM

## 2018-02-28 RX ORDER — LISINOPRIL 20 MG/1
TABLET ORAL
Qty: 30 TABLET | Refills: 0 | OUTPATIENT
Start: 2018-02-28

## 2018-02-28 NOTE — TELEPHONE ENCOUNTER
Not due for a refill.     lisinopril (PRINIVIL/ZESTRIL) 20 MG tablet was filled on 2/6/2018, qty 90 with 1 refills.

## 2018-05-28 DIAGNOSIS — I10 ESSENTIAL HYPERTENSION: ICD-10-CM

## 2018-05-28 NOTE — TELEPHONE ENCOUNTER
"Requested Prescriptions   Pending Prescriptions Disp Refills     amLODIPine (NORVASC) 5 MG tablet [Pharmacy Med Name: AMLODIPINE BESYLATE 5 MG TAB]  Last Written Prescription Date:  02/27/2018  Last Fill Quantity: 90 tablet,  # refills: 0   Last office visit: 10/18/2017 with prescribing provider:  Shannan Farias Ra, APRN CNP    Future Office Visit:     90 tablet 0     Sig: TAKE 1 TABLET (5 MG) BY MOUTH DAILY    Calcium Channel Blockers Protocol  Passed    5/28/2018  1:16 AM       Passed - Blood pressure under 140/90 in past 12 months    BP Readings from Last 3 Encounters:   02/02/18 124/80   10/18/17 138/84   10/17/17 (!) 148/98                Passed - Recent (12 mo) or future (30 days) visit within the authorizing provider's specialty    Patient had office visit in the last 12 months or has a visit in the next 30 days with authorizing provider or within the authorizing provider's specialty.  See \"Patient Info\" tab in inbasket, or \"Choose Columns\" in Meds & Orders section of the refill encounter.           Passed - Patient is age 18 or older       Passed - No active pregnancy on record       Passed - Normal serum creatinine on file in past 12 months    Recent Labs   Lab Test  10/02/17   1457   CR  0.63            Passed - No positive pregnancy test in past 12 months          "

## 2018-06-01 RX ORDER — AMLODIPINE BESYLATE 5 MG/1
TABLET ORAL
Qty: 90 TABLET | Refills: 0 | Status: SHIPPED | OUTPATIENT
Start: 2018-06-01 | End: 2018-08-30

## 2018-06-01 NOTE — TELEPHONE ENCOUNTER
Prescription approved per [facility] Refill Protocol.    Sophy Mckenzie RN   [facility]

## 2018-08-24 DIAGNOSIS — I10 ESSENTIAL HYPERTENSION: ICD-10-CM

## 2018-08-24 RX ORDER — LISINOPRIL 20 MG/1
TABLET ORAL
Qty: 90 TABLET | Refills: 0 | Status: SHIPPED | OUTPATIENT
Start: 2018-08-24 | End: 2018-08-28

## 2018-08-24 NOTE — TELEPHONE ENCOUNTER
Prescription approved per [facility] Refill Protocol.  Nataly Hutton RN

## 2018-08-24 NOTE — TELEPHONE ENCOUNTER
"Requested Prescriptions   Pending Prescriptions Disp Refills     lisinopril (PRINIVIL/ZESTRIL) 20 MG tablet [Pharmacy Med Name: LISINOPRIL 20 MG TABLET]  Last Written Prescription Date:  2/6/18  Last Fill Quantity: 90,  # refills: 1   Last office visit: 10/18/2017 with prescribing provider:  Shannan Farias Ra, APRN CNP   Future Office Visit:     90 tablet 1     Sig: TAKE 1 TABLET (20 MG) BY MOUTH DAILY    ACE Inhibitors (Including Combos) Protocol Passed    8/24/2018  1:25 AM       Passed - Blood pressure under 140/90 in past 12 months    BP Readings from Last 3 Encounters:   02/02/18 124/80   10/18/17 138/84   10/17/17 (!) 148/98                Passed - Recent (12 mo) or future (30 days) visit within the authorizing provider's specialty    Patient had office visit in the last 12 months or has a visit in the next 30 days with authorizing provider or within the authorizing provider's specialty.  See \"Patient Info\" tab in inbasket, or \"Choose Columns\" in Meds & Orders section of the refill encounter.           Passed - Patient is age 18 or older       Passed - No active pregnancy on record       Passed - Normal serum creatinine on file in past 12 months    Recent Labs   Lab Test  10/02/17   1457   CR  0.63            Passed - Normal serum potassium on file in past 12 months    Recent Labs   Lab Test  10/02/17   1457   POTASSIUM  3.5            Passed - No positive pregnancy test in past 12 months          "

## 2018-08-28 ENCOUNTER — TELEPHONE (OUTPATIENT)
Dept: FAMILY MEDICINE | Facility: CLINIC | Age: 48
End: 2018-08-28

## 2018-08-28 ENCOUNTER — APPOINTMENT (OUTPATIENT)
Dept: ULTRASOUND IMAGING | Facility: CLINIC | Age: 48
End: 2018-08-28
Attending: EMERGENCY MEDICINE
Payer: COMMERCIAL

## 2018-08-28 ENCOUNTER — HOSPITAL ENCOUNTER (EMERGENCY)
Facility: CLINIC | Age: 48
Discharge: HOME | End: 2018-08-28
Attending: EMERGENCY MEDICINE | Admitting: EMERGENCY MEDICINE
Payer: COMMERCIAL

## 2018-08-28 VITALS
BODY MASS INDEX: 26.66 KG/M2 | SYSTOLIC BLOOD PRESSURE: 127 MMHG | WEIGHT: 160 LBS | RESPIRATION RATE: 16 BRPM | TEMPERATURE: 98.2 F | HEIGHT: 65 IN | DIASTOLIC BLOOD PRESSURE: 72 MMHG | OXYGEN SATURATION: 100 %

## 2018-08-28 DIAGNOSIS — M79.662 PAIN OF LEFT LOWER LEG: ICD-10-CM

## 2018-08-28 LAB
ANION GAP SERPL CALCULATED.3IONS-SCNC: 6 MMOL/L (ref 3–14)
BUN SERPL-MCNC: 10 MG/DL (ref 7–30)
CALCIUM SERPL-MCNC: 8.2 MG/DL (ref 8.5–10.1)
CHLORIDE SERPL-SCNC: 107 MMOL/L (ref 94–109)
CO2 SERPL-SCNC: 25 MMOL/L (ref 20–32)
CREAT SERPL-MCNC: 0.52 MG/DL (ref 0.52–1.04)
GFR SERPL CREATININE-BSD FRML MDRD: >90 ML/MIN/1.7M2
GLUCOSE SERPL-MCNC: 100 MG/DL (ref 70–99)
POTASSIUM SERPL-SCNC: 4.1 MMOL/L (ref 3.4–5.3)
SODIUM SERPL-SCNC: 138 MMOL/L (ref 133–144)

## 2018-08-28 PROCEDURE — 80048 BASIC METABOLIC PNL TOTAL CA: CPT | Performed by: EMERGENCY MEDICINE

## 2018-08-28 PROCEDURE — 99284 EMERGENCY DEPT VISIT MOD MDM: CPT | Mod: 25

## 2018-08-28 PROCEDURE — 36415 COLL VENOUS BLD VENIPUNCTURE: CPT | Performed by: EMERGENCY MEDICINE

## 2018-08-28 PROCEDURE — 93971 EXTREMITY STUDY: CPT | Mod: LT

## 2018-08-28 ASSESSMENT — ENCOUNTER SYMPTOMS
ARTHRALGIAS: 1
MYALGIAS: 1
SHORTNESS OF BREATH: 0
FEVER: 0
NUMBNESS: 1

## 2018-08-28 NOTE — TELEPHONE ENCOUNTER
Pt calls.  She is on lisinopril and amlodipine.  She has itchy skin and has developed a cough.  Advised to see Shannan for this.  She has an appt on Thursday.      She has been getting bad laila horses.  She has a pain in her lower calf that is making her foot go numb.  This started 4 days ago.  She thought it was a laila horse, it is getting worse.  It almost hurts more when she is not walking on it.  It is her left calf, off to the side.  There maybe is a little bit of reddness.  It is not swollen.  She is not on birth control.  She denies any injury there.  She denies ever having anything like this before.  Advised to go to the ER for further eval.

## 2018-08-28 NOTE — ED AVS SNAPSHOT
[facility] Emergency Department    [address]    [facility] [address]    Phone:  [phone]    Fax:  [phone]                                       Corinna Barker   MRN: [id number]    Department:  [facility] Emergency Department   Date of Visit:  8/28/2018           After Visit Summary Signature Page     I have received my discharge instructions, and my questions have been answered. I have discussed any challenges I see with this plan with the nurse or doctor.    ..........................................................................................................................................  Patient/Patient Representative Signature      ..........................................................................................................................................  Patient Representative Print Name and Relationship to Patient    ..................................................               ................................................  Date                                            Time    ..........................................................................................................................................  Reviewed by Signature/Title    ...................................................              ..............................................  Date                                                            Time          22EPIC Rev 08/18

## 2018-08-28 NOTE — ED TRIAGE NOTES
Left calf pain that began about 5 days ago.  Worsening pain. Patient alert and oriented x3.  Airway, breathing and circulation intact.

## 2018-08-28 NOTE — ED PROVIDER NOTES
"  History     Chief Complaint:  Leg Pain      HPI   Corinna Barker is a 47 year old female, with a history of hypertension, who presents with leg pain. Patient states about five days ago she began having left calf pain and slight ankle pain. She reports no injury or recent trauma. She denies any shortness of breathe or fever. Patient describes the pain as a pinching and sharp nerve. She notes some numbness in her foot, but none higher up in her leg.  She does not have any back pain either.      Allergies:  Aspirin     Medications:    Norvasc   Lisinopril     Past Medical History:    Hypertension    Past Surgical History:    The patient does not have any pertinent past surgical history.    Family History:    No past pertinent family history.    Social History:  Smoking Status: Daily smoker   Smokeless Tobacco: No  Alcohol Use: Yes  Marital Status:   [2]    Review of Systems   Constitutional: Negative for fever.   Respiratory: Negative for shortness of breath.    Musculoskeletal: Positive for arthralgias and myalgias.   Neurological: Positive for numbness.   All other systems reviewed and are negative.    Physical Exam   Vitals:  Patient Vitals for the past 24 hrs:   BP Temp Temp src Heart Rate Resp SpO2 Height Weight   08/28/18 1209 - - - 82 16 100 % - -   08/28/18 1200 127/72 - - - - 99 % - -   08/28/18 1145 (!) 144/93 - - - - - - -   08/28/18 0955 (!) 162/103 98.2  F (36.8  C) Temporal 97 - 99 % 1.651 m (5' 5\") 72.6 kg (160 lb)         Physical Exam  General: Patient is alert and interactive when I enter the room  Head:  The scalp, face, and head appear normal  Eyes:  Conjunctivae are normal  ENT:    The nose is normal    Pinnae are normal    External acoustic canals are normal  Neck:  Trachea midline  CV:  Pulses are normal.    Resp:  No respiratory distress   Abdomen:      Soft, non-tender, non-distended  Musc:  Normal muscular tone    No major joint effusions    Tenderness of the distal aspect of her " left calf, no obvious swelling, no hematoma, 2+ DP pulses good range of motion of knee and ankle, no laceration  Skin:  No rash or lesions noted  Neuro: Speech is normal and fluent. Face is symmetric.     Moving all extremities well.   Psych:  Awake. Alert.  Normal affect.  Appropriate interactions.    Emergency Department Course     Imaging:  Radiology findings were communicated with the patient who voiced understanding of the findings.  US lower extremity venous Duplex Left   IMPRESSION: No evidence of deep venous thrombosis.  Reading per radiology.    Laboratory:  Laboratory findings were communicated with the patient who voiced understanding of the findings.  BMP: Glucose 100 (H), Calcium 8.2 (L) O/W WNL (Creatinine 0.52)    Emergency Department Course:  Nursing notes and vitals reviewed.  I performed an exam of the patient as documented above.   IV was inserted and blood was drawn for laboratory testing, results above.  The patient was sent for a US lower extremity venous duplex left while in the emergency department, results above.     1206 check  In with the patient.     I personally reviewed the laboratory results with the patient and answered all related questions prior to discharge.    Findings and plan explained to the patient. Patient discharged home with instructions regarding supportive care, medications, and reasons to return. The importance of close follow-up was reviewed.     Impression & Plan      Medical Decision Making:  Corinna Barker is a 46 yo F who presents with left leg pain.  She is neurovascularly intact on the area that she is complaining of pain.  Her pain seems to be in the distal calf.  Ultrasound was done which revealed no DVT.  She has no traumatic injuries I do not think she needs an x-ray.  No obvious external changes.  Her electrolytes were normal so do not think this is also the cause of her pain. Unclear what the cause of her pain is however I do not think she needs admission at  this point.  I think patient can be discharged given her well appearance.       Diagnosis:    ICD-10-CM    1. Pain of left lower leg M79.662        Disposition:   Discharged    CMS Diagnoses: None     Scribe Disclosure:  I, Cyndi Chapman, am serving as a scribe at 10:16 AM on 8/28/2018 to document services personally performed by Krysten Isabel MD, based on my observations and the provider's statements to me.  [facility] EMERGENCY DEPARTMENT       Krysten Isabel MD  08/29/18 3216

## 2018-08-28 NOTE — ED AVS SNAPSHOT
[facility]    [address] [place] [address]    Phone:  [phone]    Fax:  [phone]                                       Corinna Barker   MRN: [id number]    Department:  [facility]   Date of Visit:  8/28/2018           Patient Information     Date Of Birth          1970        Your diagnoses for this visit were:     Pain of left lower leg        You were seen by Krysten Isabel MD.      Follow-up Information     Follow up with Shannan Farias Ra, APRN CNP In 2 days.    Specialty:  Family Practice    Contact information:    [address] [place] [address]  [phone]          Discharge Instructions         Acute Pain, Uncertain Cause  Pain can be caused by many conditions that range from very minor to very serious. In some cases, though, pain comes and goes with no apparent cause.  We were not able to find the exact cause for your pain. At this time there is no sign of any serious illness causing your pain. More tests may be needed to determine the cause. In many cases, pain like this goes away by itself.  Home care  Take any medicines as prescribed. If another medicine was not prescribed for pain, you can take an over-the-counter pain medicine such as ibuprofen or acetaminophen. Use these as directed on the label.    Follow-up care  Follow up with your healthcare provider or our staff as directed.  When to seek medical advice  Call your healthcare provider for any of the following:    Pain changes in pattern    Pain doesn't lessen or gets worse    New symptoms appear    Fever of 100.4 F (38 C) or higher, or as directed by your healthcare provider  Date Last Reviewed: 7/26/2015 2000-2017 [employer]. [address] [place] [address]. All rights reserved. This information is not intended as a substitute for professional medical care. Always follow your healthcare professional's  instructions.          Your next 10 appointments already scheduled     Aug 30, 2018  8:40 AM CDT   Office Visit with RICHARD Marks Ra, CNP   [facility]    [address] [facility] [address] [phone]           Bring a current list of meds and any records pertaining to this visit. For Physicals, please bring immunization records and any forms needing to be filled out. Please arrive 10 minutes early to complete paperwork.              24 Hour Appointment Hotline       To make an appointment at any [facility], call [phone] ([phone]). If you don't have a family doctor or clinic, we will help you find one. [facility] are conveniently located to serve the needs of you and your family.             Review of your medicines      Our records show that you are taking the medicines listed below. If these are incorrect, please call your family doctor or clinic.        Dose / Directions Last dose taken    amLODIPine 5 MG tablet   Commonly known as:  NORVASC   Quantity:  90 tablet        TAKE 1 TABLET (5 MG) BY MOUTH DAILY   Refills:  0        lisinopril 20 MG tablet   Commonly known as:  PRINIVIL/ZESTRIL   Quantity:  30 tablet        TAKE 1 TABLET (20 MG) BY MOUTH DAILY   Refills:  0                Procedures and tests performed during your visit     Basic metabolic panel    US Lower Extremity Venous Duplex Left      Orders Needing Specimen Collection     None      Pending Results     No orders found from 8/26/2018 to 8/29/2018.            Pending Culture Results     No orders found from 8/26/2018 to 8/29/2018.            Pending Results Instructions     If you had any lab results that were not finalized at the time of your Discharge, you can call the ED Lab Result RN at [phone]. You will be contacted by this team for any positive Lab results or changes in treatment. The nurses are available 7 days a week from 10A to 6:30P.  You can  leave a message 24 hours per day and they will return your call.        Test Results From Your Hospital Stay        8/28/2018 11:14 AM      Component Results     Component Value Ref Range & Units Status    Sodium 138 133 - 144 mmol/L Final    Potassium 4.1 3.4 - 5.3 mmol/L Final    Chloride 107 94 - 109 mmol/L Final    Carbon Dioxide 25 20 - 32 mmol/L Final    Anion Gap 6 3 - 14 mmol/L Final    Glucose 100 (H) 70 - 99 mg/dL Final    Urea Nitrogen 10 7 - 30 mg/dL Final    Creatinine 0.52 0.52 - 1.04 mg/dL Final    GFR Estimate >90 >60 mL/min/1.7m2 Final    Non  GFR Calc    GFR Estimate If Black >90 >60 mL/min/1.7m2 Final    African American GFR Calc    Calcium 8.2 (L) 8.5 - 10.1 mg/dL Final         8/28/2018 11:55 AM      Narrative     VENOUS ULTRASOUND LEFT LEG  8/28/2018 11:48 AM     HISTORY: Pain.     COMPARISON: None.    FINDINGS: Examination of the deep veins with graded compression and  color flow Doppler with spectral wave form analysis shows no evidence  of thrombus in the common femoral vein, femoral vein, popliteal vein  or calf veins. There is no venous insufficiency.        Impression     IMPRESSION: No evidence of deep venous thrombosis.    DIXON BEEBE MD                Clinical Quality Measure: Blood Pressure Screening     Your blood pressure was checked while you were in the emergency department today. The last reading we obtained was  BP: (!) 162/103 . Please read the guidelines below about what these numbers mean and what you should do about them.  If your systolic blood pressure (the top number) is less than 120 and your diastolic blood pressure (the bottom number) is less than 80, then your blood pressure is normal. There is nothing more that you need to do about it.  If your systolic blood pressure (the top number) is 120-139 or your diastolic blood pressure (the bottom number) is 80-89, your blood pressure may be higher than it should be. You should have your blood pressure  "rechecked within a year by a primary care provider.  If your systolic blood pressure (the top number) is 140 or greater or your diastolic blood pressure (the bottom number) is 90 or greater, you may have high blood pressure. High blood pressure is treatable, but if left untreated over time it can put you at risk for heart attack, stroke, or kidney failure. You should have your blood pressure rechecked by a primary care provider within the next 4 weeks.  If your provider in the emergency department today gave you specific instructions to follow-up with your doctor or provider even sooner than that, you should follow that instruction and not wait for up to 4 weeks for your follow-up visit.        Thank you for choosing [facility]       Thank you for choosing [facility] for your care. Our goal is always to provide you with excellent care. Hearing back from our patients is one way we can continue to improve our services. Please take a few minutes to complete the written survey that you may receive in the mail after you visit with us. Thank you!        [employer] Information     [employer] lets you send messages to your doctor, view your test results, renew your prescriptions, schedule appointments and more. To sign up, go to [email or web address] . Click on \"Log in\" on the left side of the screen, which will take you to the Welcome page. Then click on \"Sign up Now\" on the right side of the page.     You will be asked to enter the access code listed below, as well as some personal information. Please follow the directions to create your username and password.     Your access code is: [email or web address]  Expires: 2018 11:23 AM     Your access code will  in 90 days. If you need help or a new code, please call your [facility] clinic or [phone].        Care EveryWhere ID     This is your Care EveryWhere ID. This could be used by other organizations to access your [facility] medical records  [id number]        Equal Access to " Services     [facility]: Keron Washington, wabahmanda luqadaha, qaybta kahoda collier. So [facility] [phone].    ATENCIÓN: Si habla español, tiene a darby disposición servicios gratuitos de asistencia lingüística. Llame al [phone].    We comply with applicable federal civil rights laws and [place] laws. We do not discriminate on the basis of race, color, national origin, age, disability, sex, sexual orientation, or gender identity.            After Visit Summary       This is your record. Keep this with you and show to your community pharmacist(s) and doctor(s) at your next visit.

## 2018-08-28 NOTE — DISCHARGE INSTRUCTIONS
Acute Pain, Uncertain Cause  Pain can be caused by many conditions that range from very minor to very serious. In some cases, though, pain comes and goes with no apparent cause.  We were not able to find the exact cause for your pain. At this time there is no sign of any serious illness causing your pain. More tests may be needed to determine the cause. In many cases, pain like this goes away by itself.  Home care  Take any medicines as prescribed. If another medicine was not prescribed for pain, you can take an over-the-counter pain medicine such as ibuprofen or acetaminophen. Use these as directed on the label.    Follow-up care  Follow up with your healthcare provider or our staff as directed.  When to seek medical advice  Call your healthcare provider for any of the following:    Pain changes in pattern    Pain doesn't lessen or gets worse    New symptoms appear    Fever of 100.4 F (38 C) or higher, or as directed by your healthcare provider  Date Last Reviewed: 7/26/2015 2000-2017 [employer]. [address] [place] [address], [place] [address]. All rights reserved. This information is not intended as a substitute for professional medical care. Always follow your healthcare professional's instructions.

## 2018-08-30 ENCOUNTER — OFFICE VISIT (OUTPATIENT)
Dept: FAMILY MEDICINE | Facility: CLINIC | Age: 48
End: 2018-08-30
Payer: COMMERCIAL

## 2018-08-30 VITALS
WEIGHT: 166 LBS | OXYGEN SATURATION: 98 % | HEART RATE: 90 BPM | SYSTOLIC BLOOD PRESSURE: 128 MMHG | RESPIRATION RATE: 16 BRPM | DIASTOLIC BLOOD PRESSURE: 88 MMHG | TEMPERATURE: 98.6 F | BODY MASS INDEX: 27.62 KG/M2

## 2018-08-30 DIAGNOSIS — R05.9 COUGH: ICD-10-CM

## 2018-08-30 DIAGNOSIS — I10 ESSENTIAL HYPERTENSION: Primary | ICD-10-CM

## 2018-08-30 DIAGNOSIS — I10 ESSENTIAL HYPERTENSION: ICD-10-CM

## 2018-08-30 DIAGNOSIS — L50.9 HIVES: ICD-10-CM

## 2018-08-30 PROCEDURE — 99213 OFFICE O/P EST LOW 20 MIN: CPT | Performed by: NURSE PRACTITIONER

## 2018-08-30 RX ORDER — LOSARTAN POTASSIUM 50 MG/1
50 TABLET ORAL DAILY
Qty: 30 TABLET | Refills: 1 | Status: SHIPPED | OUTPATIENT
Start: 2018-08-30 | End: 2018-09-25

## 2018-08-30 NOTE — MR AVS SNAPSHOT
"              After Visit Summary   8/30/2018    Corinna Barker    MRN: [id number]           Patient Information     Date Of Birth          1970        Visit Information        Provider Department      8/30/2018 8:40 AM Shannan Farias Ra, APRN CNP [facility]        Today's Diagnoses     Essential hypertension    -  1      Care Instructions    Stop the lisinopril.  Start the losartan.    In about 3 weeks have your blood pressure rechecked and your labs rechecked.  This will be a nurse only and lab only appointment.  If BP looks good I will send refills.  If the cough or the itching does not resolve please let me know.          Follow-ups after your visit        Follow-up notes from your care team     Return in about 3 weeks (around 9/20/2018) for BP Recheck, Lab Work.      Who to contact     If you have questions or need follow up information about today's clinic visit or your schedule please contact [facility] directly at [phone].  Normal or non-critical lab and imaging results will be communicated to you by [employer], letter or phone within 4 business days after the clinic has received the results. If you do not hear from us within 7 days, please contact the clinic through [employer] or phone. If you have a critical or abnormal lab result, we will notify you by phone as soon as possible.  Submit refill requests through [employer] or call your pharmacy and they will forward the refill request to us. Please allow 3 business days for your refill to be completed.          Additional Information About Your Visit        [employer] Information     [employer] lets you send messages to your doctor, view your test results, renew your prescriptions, schedule appointments and more. To sign up, go to [email or web address] . Click on \"Log in\" on the left side of the screen, which will take you to the Welcome page. Then click on \"Sign up Now\" on the right side of the page.     You will be asked " to enter the access code listed below, as well as some personal information. Please follow the directions to create your username and password.     Your access code is: [email or web address]  Expires: 2018 11:23 AM     Your access code will  in 90 days. If you need help or a new code, please call your [facility] clinic or [phone].        Care EveryWhere ID     This is your Care EveryWhere ID. This could be used by other organizations to access your [facility] medical records  [id number]        Your Vitals Were     Pulse Temperature Respirations Last Period Pulse Oximetry BMI (Body Mass Index)    90 98.6  F (37  C) (Tympanic) 16 2018 (Approximate) 98% 27.62 kg/m2       Blood Pressure from Last 3 Encounters:   18 128/88   18 127/72   18 124/80    Weight from Last 3 Encounters:   18 166 lb (75.3 kg)   18 160 lb (72.6 kg)   10/18/17 164 lb 14.4 oz (74.8 kg)              Today, you had the following     No orders found for display         Today's Medication Changes          These changes are accurate as of 18  9:02 AM.  If you have any questions, ask your nurse or doctor.               Start taking these medicines.        Dose/Directions    losartan 50 MG tablet   Commonly known as:  COZAAR   Used for:  Essential hypertension   Started by:  Shannan Farias Ra, APRN CNP        Dose:  50 mg   Take 1 tablet (50 mg) by mouth daily   Quantity:  30 tablet   Refills:  1         Stop taking these medicines if you haven't already. Please contact your care team if you have questions.     lisinopril 20 MG tablet   Commonly known as:  PRINIVIL/ZESTRIL   Stopped by:  Shannan Farias Ra, APRN CNP                Where to get your medicines      These medications were sent to [facility]/pharmacy #[id number] - [place] - [address]  [address] [place] [address]     Phone:  [phone]     losartan 50 MG tablet                Primary Care Provider Office Phone # Fax #    Shannan Ross  Dinora, APRN [phone]       [address] [facility] [address]        Equal Access to Services     RAFAEL LERMA : Hadii aad ku hadmary joo Sochris, waaxda luqadaha, qaybta kaalmada aderemingtonda, hoda thibodeaux laNereidafroylan michelle. So [facility] [phone].    ATENCIÓN: Si habla español, tiene a darby disposición servicios gratuitos de asistencia lingüística. Llame al [phone].    We comply with applicable federal civil rights laws and [place] laws. We do not discriminate on the basis of race, color, national origin, age, disability, sex, sexual orientation, or gender identity.            Thank you!     Thank you for choosing [facility]  for your care. Our goal is always to provide you with excellent care. Hearing back from our patients is one way we can continue to improve our services. Please take a few minutes to complete the written survey that you may receive in the mail after your visit with us. Thank you!             Your Updated Medication List - Protect others around you: Learn how to safely use, store and throw away your medicines at www.disposemymeds.org.          This list is accurate as of 8/30/18  9:02 AM.  Always use your most recent med list.                   Brand Name Dispense Instructions for use Diagnosis    amLODIPine 5 MG tablet    NORVASC    90 tablet    TAKE 1 TABLET (5 MG) BY MOUTH DAILY    Essential hypertension       losartan 50 MG tablet    COZAAR    30 tablet    Take 1 tablet (50 mg) by mouth daily    Essential hypertension

## 2018-08-30 NOTE — TELEPHONE ENCOUNTER
"Requested Prescriptions   Pending Prescriptions Disp Refills     amLODIPine (NORVASC) 5 MG tablet [Pharmacy Med Name: AMLODIPINE BESYLATE 5 MG TAB]  Last Written Prescription Date:  6/1/18  Last Fill Quantity: 90,  # refills: 0   Last office visit: 10/18/17 with prescribing provider:  Shannan Farias Ra, APRN CNP    Future Office Visit:     90 tablet 0     Sig: TAKE 1 TABLET (5 MG) BY MOUTH DAILY    Calcium Channel Blockers Protocol  Passed    8/30/2018  1:47 AM       Passed - Blood pressure under 140/90 in past 12 months    BP Readings from Last 3 Encounters:   08/30/18 128/88   08/28/18 127/72   02/02/18 124/80                Passed - Recent (12 mo) or future (30 days) visit within the authorizing provider's specialty    Patient had office visit in the last 12 months or has a visit in the next 30 days with authorizing provider or within the authorizing provider's specialty.  See \"Patient Info\" tab in inbasket, or \"Choose Columns\" in Meds & Orders section of the refill encounter.           Passed - Patient is age 18 or older       Passed - No active pregnancy on record       Passed - Normal serum creatinine on file in past 12 months    Recent Labs   Lab Test  08/28/18   1033   CR  0.52            Passed - No positive pregnancy test in past 12 months          "

## 2018-08-30 NOTE — PROGRESS NOTES
SUBJECTIVE:   Corinna Barker is a 47 year old female who presents to clinic today for the following health issues:      Medication issues      Duration:  Few months    Description (location/character/radiation): Itching, cough,    Intensity:  moderate    Accompanying signs and symptoms:  Pain in legs and hand     History (similar episodes/previous evaluation): None    Precipitating or alleviating factors: None    Therapies tried and outcome: None     Taking meds as directed.  For the past month she has noticed a cough.  Intermittent.  Wonders if it is due to her med.  She is not having sob.  No fevers.  No congestion.  No sign of illness.    She also notes some intermittent pruritis and hives.  Also wonders if this is due to the medication.  She denies any facial swelling or trouble breathing.    She was recently in the ED for calf pain.  This is resolving.  US negative for clot.    She denies chest pain, palpitations, sob.    Problem list and histories reviewed & adjusted, as indicated.  Additional history: as documented    Patient Active Problem List   Diagnosis     Essential hypertension     History reviewed. No pertinent surgical history.    Social History   Substance Use Topics     Smoking status: Current Every Day Smoker     Packs/day: 0.25     Types: Cigarettes     Smokeless tobacco: Never Used      Comment: 1 pack a week     Alcohol use Yes     Family History   Problem Relation Age of Onset     Adopted: Yes           Reviewed and updated as needed this visit by clinical staff       Reviewed and updated as needed this visit by Provider         ROS:  SEE HPI.    OBJECTIVE:     /88 (BP Location: Right arm, Patient Position: Chair, Cuff Size: Adult Regular)  Pulse 90  Temp 98.6  F (37  C) (Tympanic)  Resp 16  Wt 166 lb (75.3 kg)  LMP 07/30/2018 (Approximate)  SpO2 98%  BMI 27.62 kg/m2  Body mass index is 27.62 kg/(m^2).  GENERAL: healthy, alert and no distress  RESP: lungs clear to auscultation  - no rales, rhonchi or wheezes  CV: regular rates and rhythm, normal S1 S2, no S3 or S4 and no murmur, click or rub  PSYCH: mentation appears normal, affect normal/bright    Diagnostic Test Results:  none     ASSESSMENT/PLAN:   1. Essential hypertension  Well controlled but need to change due to concerns below.  Start losartan.  Return to clinic for bp and lab only.  Start losartan.  Continue amlodipine.  Pt agrees with plan and verbalized understanding.  - losartan (COZAAR) 50 MG tablet; Take 1 tablet (50 mg) by mouth daily  Dispense: 30 tablet; Refill: 1    2. Cough  Suspicious for ACE induced cough.  Change to losartan.  Let me know if this is not helpful.    3. Hives  Possibly due to lisinopril.  If this persists the pt will let me know.    Shannan Farias, RICHARD [facility]

## 2018-08-30 NOTE — PATIENT INSTRUCTIONS
Stop the lisinopril.  Start the losartan.    In about 3 weeks have your blood pressure rechecked and your labs rechecked.  This will be a nurse only and lab only appointment.  If BP looks good I will send refills.  If the cough or the itching does not resolve please let me know.

## 2018-09-02 RX ORDER — AMLODIPINE BESYLATE 5 MG/1
TABLET ORAL
Qty: 90 TABLET | Refills: 0 | Status: SHIPPED | OUTPATIENT
Start: 2018-09-02 | End: 2018-11-26

## 2018-09-25 ENCOUNTER — ALLIED HEALTH/NURSE VISIT (OUTPATIENT)
Dept: NURSING | Facility: CLINIC | Age: 48
End: 2018-09-25
Payer: COMMERCIAL

## 2018-09-25 VITALS — SYSTOLIC BLOOD PRESSURE: 130 MMHG | DIASTOLIC BLOOD PRESSURE: 70 MMHG

## 2018-09-25 DIAGNOSIS — I10 ESSENTIAL HYPERTENSION: ICD-10-CM

## 2018-09-25 PROCEDURE — 99207 ZZC NO CHARGE NURSE ONLY: CPT

## 2018-09-25 RX ORDER — LOSARTAN POTASSIUM 50 MG/1
50 TABLET ORAL DAILY
Qty: 30 TABLET | Refills: 1 | Status: CANCELLED | OUTPATIENT
Start: 2018-09-25

## 2018-09-25 NOTE — MR AVS SNAPSHOT
"              After Visit Summary   2018    Corinna Barker    MRN: [id number]           Patient Information     Date Of Birth          1970        Visit Information        Provider Department      2018 2:30 PM  [facility]        Today's Diagnoses     Essential hypertension           Follow-ups after your visit        Who to contact     If you have questions or need follow up information about today's clinic visit or your schedule please contact [facility] directly at [phone].  Normal or non-critical lab and imaging results will be communicated to you by MyChart, letter or phone within 4 business days after the clinic has received the results. If you do not hear from us within 7 days, please contact the clinic through [employer] or phone. If you have a critical or abnormal lab result, we will notify you by phone as soon as possible.  Submit refill requests through [email or web address] or call your pharmacy and they will forward the refill request to us. Please allow 3 business days for your refill to be completed.          Additional Information About Your Visit        MyChart Information     [email or web address] lets you send messages to your doctor, view your test results, renew your prescriptions, schedule appointments and more. To sign up, go to [email or web address] . Click on \"Log in\" on the left side of the screen, which will take you to the Welcome page. Then click on \"Sign up Now\" on the right side of the page.     You will be asked to enter the access code listed below, as well as some personal information. Please follow the directions to create your username and password.     Your access code is: [email or web address]  Expires: 2018 11:23 AM     Your access code will  in 90 days. If you need help or a new code, please call your [facility] or [phone].        Care EveryWhere ID     This is your Care EveryWhere ID. This could be used by other organizations to " access your [facility] medical records  [id number]         Blood Pressure from Last 3 Encounters:   09/25/18 130/70   08/30/18 128/88   08/28/18 127/72    Weight from Last 3 Encounters:   08/30/18 166 lb (75.3 kg)   08/28/18 160 lb (72.6 kg)   10/18/17 164 lb 14.4 oz (74.8 kg)              Today, you had the following     No orders found for display       Primary Care Provider Office Phone # Fax #    Shannan Farias APRJULIA [facility] [phone]       [address] [address] [facility] [address]        Equal Access to Services     SUSANNA LERMA : Hadii aad ku hadasho Soomaali, waaxda luqadaha, qaybta kaalmada adeegyada, waxdutch de la torre . So [facility] [phone].    ATENCIÓN: Si habla español, tiene a darby disposición servicios gratuitos de asistencia lingüística. Llame al [phone].    We comply with applicable federal civil rights laws and [place] laws. We do not discriminate on the basis of race, color, national origin, age, disability, sex, sexual orientation, or gender identity.            Thank you!     Thank you for choosing [facility]  for your care. Our goal is always to provide you with excellent care. Hearing back from our patients is one way we can continue to improve our services. Please take a few minutes to complete the written survey that you may receive in the mail after your visit with us. Thank you!             Your Updated Medication List - Protect others around you: Learn how to safely use, store and throw away your medicines at www.disposemymeds.org.          This list is accurate as of 9/25/18  2:53 PM.  Always use your most recent med list.                   Brand Name Dispense Instructions for use Diagnosis    amLODIPine 5 MG tablet    NORVASC    90 tablet    TAKE 1 TABLET (5 MG) BY MOUTH DAILY    Essential hypertension       losartan 50 MG tablet    COZAAR    30 tablet    Take 1 tablet (50 mg) by mouth daily    Essential hypertension

## 2018-09-25 NOTE — PROGRESS NOTES
Corinna Barker is a 48 year old patient who comes in today for a Blood Pressure check.  Initial BP:  /70     Data Unavailable  Disposition: results routed to provider  Renita SHIRLEY M.A.

## 2018-09-26 RX ORDER — LOSARTAN POTASSIUM 50 MG/1
50 TABLET ORAL DAILY
Qty: 90 TABLET | Refills: 1 | Status: SHIPPED | OUTPATIENT
Start: 2018-09-26 | End: 2019-04-15

## 2018-11-26 DIAGNOSIS — I10 ESSENTIAL HYPERTENSION: ICD-10-CM

## 2018-11-26 NOTE — TELEPHONE ENCOUNTER
"Requested Prescriptions   Pending Prescriptions Disp Refills     amLODIPine (NORVASC) 5 MG tablet  Last Written Prescription Date:  9/29/18  Last Fill Quantity: 90,  # refills: 0   Last office visit: 8/30/2018 with prescribing provider:  Shannan Farias Ra, APRN CNP    Future Office Visit:     90 tablet 0    Calcium Channel Blockers Protocol  Passed    11/26/2018  3:14 PM       Passed - Blood pressure under 140/90 in past 12 months    BP Readings from Last 3 Encounters:   09/25/18 130/70   08/30/18 128/88   08/28/18 127/72                Passed - Recent (12 mo) or future (30 days) visit within the authorizing provider's specialty    Patient had office visit in the last 12 months or has a visit in the next 30 days with authorizing provider or within the authorizing provider's specialty.  See \"Patient Info\" tab in inbasket, or \"Choose Columns\" in Meds & Orders section of the refill encounter.             Passed - Patient is age 18 or older       Passed - No active pregnancy on record       Passed - Normal serum creatinine on file in past 12 months    Recent Labs   Lab Test  08/28/18   1033   CR  0.52            Passed - No positive pregnancy test in past 12 months          "

## 2018-11-28 RX ORDER — AMLODIPINE BESYLATE 5 MG/1
TABLET ORAL
Qty: 90 TABLET | Refills: 2 | Status: SHIPPED | OUTPATIENT
Start: 2018-11-28 | End: 2019-08-31

## 2019-04-15 DIAGNOSIS — I10 ESSENTIAL HYPERTENSION: ICD-10-CM

## 2019-04-15 NOTE — TELEPHONE ENCOUNTER
"Requested Prescriptions   Pending Prescriptions Disp Refills     losartan (COZAAR) 50 MG tablet [Pharmacy Med Name: LOSARTAN POTASSIUM 50 MG TAB]  Last Written Prescription Date:  9/26/18  Last Fill Quantity: 90,  # refills: 1    Last office visit: 8/30/2018 with prescribing provider:  Shannan Farias Ra, APRN CNP       Future Office Visit:     90 tablet 1     Sig: TAKE 1 TABLET BY MOUTH EVERY DAY       Angiotensin-II Receptors Passed - 4/15/2019  1:23 AM        Passed - Blood pressure under 140/90 in past 12 months     BP Readings from Last 3 Encounters:   09/25/18 130/70   08/30/18 128/88   08/28/18 127/72                 Passed - Recent (12 mo) or future (30 days) visit within the authorizing provider's specialty     Patient had office visit in the last 12 months or has a visit in the next 30 days with authorizing provider or within the authorizing provider's specialty.  See \"Patient Info\" tab in inbasket, or \"Choose Columns\" in Meds & Orders section of the refill encounter.              Passed - Medication is active on med list        Passed - Patient is age 18 or older        Passed - No active pregnancy on record        Passed - Normal serum creatinine on file in past 12 months     Recent Labs   Lab Test 08/28/18  1033   CR 0.52             Passed - Normal serum potassium on file in past 12 months     Recent Labs   Lab Test 08/28/18  1033   POTASSIUM 4.1                    Passed - No positive pregnancy test in past 12 months          "

## 2019-04-17 RX ORDER — LOSARTAN POTASSIUM 50 MG/1
TABLET ORAL
Qty: 30 TABLET | Refills: 0 | Status: SHIPPED | OUTPATIENT
Start: 2019-04-17 | End: 2019-04-22

## 2019-04-17 NOTE — TELEPHONE ENCOUNTER
Routing refill request to provider for review/approval because:  Labs not current:  Patient was to have lab done 3 weeks from LOV. Lab order t'd up to sign.    Please sign if approved. Route to [facility] when done.    Urmila Beckett RN

## 2019-04-22 DIAGNOSIS — I10 ESSENTIAL HYPERTENSION: ICD-10-CM

## 2019-04-22 LAB
ANION GAP SERPL CALCULATED.3IONS-SCNC: 7 MMOL/L (ref 3–14)
BUN SERPL-MCNC: 10 MG/DL (ref 7–30)
CALCIUM SERPL-MCNC: 9 MG/DL (ref 8.5–10.1)
CHLORIDE SERPL-SCNC: 106 MMOL/L (ref 94–109)
CO2 SERPL-SCNC: 24 MMOL/L (ref 20–32)
CREAT SERPL-MCNC: 0.61 MG/DL (ref 0.52–1.04)
GFR SERPL CREATININE-BSD FRML MDRD: >90 ML/MIN/{1.73_M2}
GLUCOSE SERPL-MCNC: 104 MG/DL (ref 70–99)
POTASSIUM SERPL-SCNC: 4 MMOL/L (ref 3.4–5.3)
SODIUM SERPL-SCNC: 137 MMOL/L (ref 133–144)

## 2019-04-22 PROCEDURE — 80048 BASIC METABOLIC PNL TOTAL CA: CPT | Performed by: NURSE PRACTITIONER

## 2019-04-22 PROCEDURE — 36415 COLL VENOUS BLD VENIPUNCTURE: CPT | Performed by: NURSE PRACTITIONER

## 2019-04-22 RX ORDER — LOSARTAN POTASSIUM 50 MG/1
50 TABLET ORAL DAILY
Qty: 90 TABLET | Refills: 1 | Status: SHIPPED | OUTPATIENT
Start: 2019-04-22 | End: 2019-11-29

## 2019-06-06 ENCOUNTER — NURSE TRIAGE (OUTPATIENT)
Dept: FAMILY MEDICINE | Facility: CLINIC | Age: 49
End: 2019-06-06

## 2019-06-06 NOTE — TELEPHONE ENCOUNTER
Patient states she feels her Losartan is making her feel itchy and when she itches the scratch marks turn to welts.      Please review and advise.     Ok to leave detailed message.     Additional Information    Negative: Sudden onset of rash (within last 2 hours) and difficulty with breathing or swallowing    Negative: Difficult to awaken or acting confused (e.g., disoriented, slurred speech)    Negative: Fever and purple or blood-colored spots or dots    Negative: Too weak or sick to stand    Negative: Life-threatening reaction (anaphylaxis) in the past to similar substance (e.g., food, insect bite/sting, chemical, etc.) and < 2 hours since exposure    Negative: Sounds like a life-threatening emergency to the triager    Negative: Insect bites suspected    Negative: Sunburn suspected    Hives suspected    Negative: Difficulty breathing or wheezing now    Negative: Rapid onset of swollen tongue    Negative: Rapid onset of hoarseness or cough    Negative: Very weak (can't stand)    Negative: Difficult to awaken or acting confused (e.g., disoriented, slurred speech)    Negative: Life-threatening reaction (anaphylaxis) in the past to similar substance (e.g., food, insect bite/sting, chemical, etc.) and < 2 hours since exposure    Negative: Sounds like a life-threatening emergency to the triager    Negative: Bee, wasp, or yellow jacket sting within last 24 hours    Negative: Taking a new medicine now or within last 3 days (EXCEPTION: antihistamine, decongestant or other OTC cough/cold medicines)    Doesn't match the SYMPTOMS of hives    Negative: Sounds like a life-threatening emergency to the triager    Negative: Possible contact with poison ivy or oak    Negative: Insect bite(s) suspected    Negative: Athlete's Foot suspected (i.e., itchy rash between the toes)    Negative: Jock Itch suspected (i.e., itchy rash on inner thighs near genital area)    Negative: Wound infection suspected (i.e., pain, spreading redness, or  "pus; in a cut, puncture, scrape or sutured wound)    Negative: Rash of external female genital area (vulva)    Negative: Rash of penis or scrotum    Negative: Small spot, skin growth, or mole    Negative: Fever and localized purple or blood-colored spots or dots that are not from injury or friction    Negative: Fever and localized rash is very painful    Negative: Patient sounds very sick or weak to the triager    Negative: Looks like a boil, infected sore, deep ulcer, or other infected rash (spreading redness, pus)    Negative: Painful rash with multiple small blisters grouped together (i.e., dermatomal distribution or 'band' or 'stripe')    Negative: Localized rash is very painful (no fever)    Negative: Localized purple or blood-colored spots or dots that are not from injury or friction (no fever)    Negative: Lyme disease suspected (e.g., bull's-eye rash or tick bite / exposure)    Negative: Patient wants to be seen    Negative: Tender bumps in armpits    Negative: Pimples (localized) and no improvement after using CARE ADVICE    Negative: SEVERE local itching persists after 2 days of steroid cream    Negative: Applying cream or ointment and it causes severe itch, burning, or pain    Negative: Localized rash present > 7 days    Negative: Red, moist, irritated area between skin folds (or under larger breasts)    Negative: Mild localized rash    Negative: Pimples (localized)    Negative: Redness or itching where jewelry (or metal) touches skin and jewelry contains nickel    Answer Assessment - Initial Assessment Questions  1. APPEARANCE of RASH: \"Describe the rash.\" (e.g., spots, blisters, raised areas, skin peeling, scaly)      Not a rash. Just itching. When scratches turns into welts.  2. SIZE: \"How big are the spots?\" (e.g., tip of pen, eraser, coin; inches, centimeters)      They are where she scratches.   3. LOCATION: \"Where is the rash located?\"      Arms, back, and legs.   4. COLOR: \"What color is the " "rash?\" (Note: It is difficult to assess rash color in people with darker-colored skin. When this situation occurs, simply ask the caller to describe what they see.)     They do not turn a color.   5. ONSET: \"When did the rash begin?\"      It's been awhile. A couple months ago I said something about medication causing   6. FEVER: \"Do you have a fever?\" If so, ask: \"What is your temperature, how was it measured, and when did it start?\"      No   7. ITCHING: \"Does the rash itch?\" If so, ask: \"How bad is the itch?\" (Scale 1-10; or mild, moderate, severe)      0 at this time. When it happens it is a 6 or 7. Itching lasts about 10 min.   8. CAUSE: \"What do you think is causing the rash?\"      I don't know. I'm allergic aspirin. I don't know if medications has aspirin in it.   9. MEDICATION FACTORS: \"Have you started any new medications within the last 2 weeks?\" (e.g., antibiotics)       no  10. OTHER SYMPTOMS: \"Do you have any other symptoms?\" (e.g., dizziness, headache, sore throat, joint pain)        No   11. PREGNANCY: \"Is there any chance you are pregnant?\" \"When was your last menstrual period?\"        May 28th/29th    Answer Assessment - Initial Assessment Questions  1. APPEARANCE: \"What does the rash look like?\"       It is itchy and then when I itch it welts up.   2. LOCATION: \"Where is the rash located?\"       Arms, legs, back   3. NUMBER: \"How many hives are there?\"       Where itch  4. SIZE: \"How big are the hives?\" (inches, cm, compare to coins) \"Do they all look the same or is there lots of variation in shape and size?\"       Where I scratch myself  5. ONSET: \"When did the hives begin?\" (Hours or days ago)       Months ago. Worse in the last month.   6. ITCHING: \"Does it itch?\" If so, ask: \"How bad is the itch?\"     - MILD to MODERATE- does not interfere with life.   7. RECURRENT PROBLEM: \"Have you had hives before?\" If so, ask: \"When was the last time?\" and \"What happened that time?\"    Yes. I have allergies " "to aspirin   8. TRIGGERS: \"Were you exposed to any new food, plant, cosmetic product or animal just before the hives began?\"      No  9. OTHER SYMPTOMS: \"Do you have any other symptoms?\" (e.g., fever, tongue swelling, difficulty breathing, abdominal pain)      No   10. PREGNANCY: \"Is there any chance you are pregnant?\" \"When was your last menstrual period?\"       No    Protocols used: RASH OR REDNESS - WIDESPREAD-A-OH, HIVES-A-OH, RASH OR REDNESS - [id number]    Dolly Odom, RN Flex    "

## 2019-06-10 NOTE — TELEPHONE ENCOUNTER
Pt calls back and speaks to HEMA Valdez in [place].  She has an appt on 6/12/19.  Advised to make sure the pt knows to stop the losartan and we will see her at her appt.

## 2019-06-12 ENCOUNTER — OFFICE VISIT (OUTPATIENT)
Dept: FAMILY MEDICINE | Facility: CLINIC | Age: 49
End: 2019-06-12
Payer: COMMERCIAL

## 2019-06-12 VITALS
BODY MASS INDEX: 28.19 KG/M2 | OXYGEN SATURATION: 98 % | SYSTOLIC BLOOD PRESSURE: 144 MMHG | HEART RATE: 85 BPM | DIASTOLIC BLOOD PRESSURE: 92 MMHG | WEIGHT: 169.4 LBS | TEMPERATURE: 98.5 F | RESPIRATION RATE: 18 BRPM

## 2019-06-12 DIAGNOSIS — I10 ESSENTIAL HYPERTENSION: ICD-10-CM

## 2019-06-12 DIAGNOSIS — L50.9 HIVES: Primary | ICD-10-CM

## 2019-06-12 PROCEDURE — 99213 OFFICE O/P EST LOW 20 MIN: CPT | Performed by: NURSE PRACTITIONER

## 2019-06-12 NOTE — PROGRESS NOTES
Subjective     Corinna Barker is a 48 year old female who presents to clinic today for the following health issues:     HPI   Rash      Duration: few weeks    Description  Location: all over  Itching: moderate    Intensity:  moderate    Accompanying signs and symptoms: redness and welting    History (similar episodes/previous evaluation): None    Precipitating or alleviating factors:  New exposures:  None and medication losartan  Recent travel: no      Therapies tried and outcome: none    Symptoms have been intermittent over the past 1 year, but worsening over the past week.  Intermittent hives.  Typically occur at least once monthly and last for about a day, then resolve without treatment.  Recently they occurred and although they did initially improve when she stopped her bp meds, they returned a few days later.  She denies any issues breathing.  No GI symptoms.  No trouble swallowing.  She has not had any changes in her diet or soaps or detergents.  No new exposures at work.  She has restarted her amlodipine but not her losartan.  Of note, last August she was having some mild intermittent hives and was changed from lisinopril to losartan.      Patient Active Problem List   Diagnosis     Essential hypertension     History reviewed. No pertinent surgical history.    Social History     Tobacco Use     Smoking status: Current Every Day Smoker     Packs/day: 0.25     Types: Cigarettes     Smokeless tobacco: Never Used     Tobacco comment: 1 pack a week   Substance Use Topics     Alcohol use: Yes     Family History   Adopted: Yes             Reviewed and updated as needed this visit by Provider  Tobacco  Allergies  Meds  Problems  Med Hx  Surg Hx  Fam Hx         Review of Systems   SEE HPI.      Objective    BP (!) 144/92 (BP Location: Right arm, Patient Position: Chair, Cuff Size: Adult Regular)   Pulse 85   Temp 98.5  F (36.9  C) (Tympanic)   Resp 18   Wt 76.8 kg (169 lb 6.4 oz)   LMP 05/27/2019  (Approximate)   SpO2 98%   BMI 28.19 kg/m    Body mass index is 28.19 kg/m .  Physical Exam   GENERAL: healthy, alert and no distress  RESP: lungs clear to auscultation - no rales, rhonchi or wheezes  CV: regular rates and rhythm and normal S1 S2, no S3 or S4  SKIN:  Scattered hives on forearms and lower extremities.  PSYCH: mentation appears normal, affect normal/bright    Diagnostic Test Results:  Labs reviewed in Epic        Assessment & Plan     1. Hives  Continue to hold bp meds, although I do not believe the amlodipine is contributing.  See the allergist.  Will make adjustments to bp regimen after consult with allergist.  Pt agrees with plan and verbalized understanding.  - ALLERGY/ASTHMA ADULT REFERRAL    2. Essential hypertension  Will restart meds after seeing allergist.  She will update me and I will make changes if needed.       Tobacco Cessation:   reports that she has been smoking cigarettes.  She has been smoking about 0.25 packs per day. She has never used smokeless tobacco.      Return in about 2 weeks (around 6/26/2019) for update me via my chart or phone.    Shannan Farias, APRN CNP  [facility]

## 2019-06-12 NOTE — PATIENT INSTRUCTIONS
Schedule with the allergist.  Okay to continue to hold the blood pressure medications.  We will restart after allergy testing is completed.

## 2019-07-26 ENCOUNTER — TELEPHONE (OUTPATIENT)
Dept: FAMILY MEDICINE | Facility: CLINIC | Age: 49
End: 2019-07-26

## 2019-07-26 NOTE — LETTER
[facility]  [address] [address] [phone]       August 9, 2019    Corinna Barker  [address] [address]    Dear Corinna,    We care about your health and have reviewed your health plan and are making recommendations based on this review, to optimize your health.    You are in particular need of attention regarding:  -Blood Pressure (your goal is <140/90) BP Readings from Last 2 Encounters:   06/12/19 (!) 144/92   09/25/18 130/70        We are recommending that you:  -schedule a free NURSE-ONLY BLOOD PRESSURE APPOINTMENT within the next 1-2 weeks.    In addition, here is a list of due or overdue Health Maintenance reminders.    Health Maintenance Due   Topic Date Due     Preventive Care Visit  1970     HIV Screening  09/13/1986     Cholesterol Lab  09/13/2016     PHQ-2  01/01/2019     PAP Smear  12/01/2019       To address the above recommendations, we encourage you to contact us at [phone], via [employer] or by contacting Central Scheduling toll free at [phone] 24 hours a day. They will assist you with finding the most convenient time and location.    Thank you for trusting [facility] and we appreciate the opportunity to serve you.  We look forward to supporting your healthcare needs in the future.    Healthy Regards,    Your [facility] Team

## 2019-07-26 NOTE — TELEPHONE ENCOUNTER
Panel Management Review      Patient has the following on her problem list:     Hypertension   Last three blood pressure readings:  BP Readings from Last 3 Encounters:   06/12/19 (!) 144/92   09/25/18 130/70   08/30/18 128/88     Blood pressure: FAILED    HTN Guidelines:  Less than 140/90      Composite cancer screening  Chart review shows that this patient is due/due soon for the following None  Summary:    Patient is due/failing the following:   BP CHECK    Action needed:   Patient needs nurse only appointment.    Type of outreach:    Sent [employer] [place] message.    Questions for provider review:    None                                                                                                                                    María Lara CMA       Chart routed to Care Team .

## 2019-08-31 DIAGNOSIS — I10 ESSENTIAL HYPERTENSION: ICD-10-CM

## 2019-09-01 NOTE — TELEPHONE ENCOUNTER
"Requested Prescriptions   Pending Prescriptions Disp Refills     amLODIPine (NORVASC) 5 MG tablet [Pharmacy Med Name: AMLODIPINE BESYLATE 5 MG TAB] 90 tablet 2     Sig: TAKE 1 TABLET BY MOUTH EVERY DAY  Last Written Prescription Date:  11/28/2018  Last Fill Quantity: 90 tablet,  # refills: 2   Last office visit: 6/12/2019 with prescribing provider:  Shannan Farias Ra, APRN CNP   Future Office Visit:           Calcium Channel Blockers Protocol  Failed - 8/31/2019  8:41 AM        Failed - Blood pressure under 140/90 in past 12 months     BP Readings from Last 3 Encounters:   06/12/19 (!) 144/92   09/25/18 130/70   08/30/18 128/88                 Passed - Recent (12 mo) or future (30 days) visit within the authorizing provider's specialty     Patient had office visit in the last 12 months or has a visit in the next 30 days with authorizing provider or within the authorizing provider's specialty.  See \"Patient Info\" tab in inbasket, or \"Choose Columns\" in Meds & Orders section of the refill encounter.              Passed - Medication is active on med list        Passed - Patient is age 18 or older        Passed - No active pregnancy on record        Passed - Normal serum creatinine on file in past 12 months     Recent Labs   Lab Test 04/22/19  0806   CR 0.61             Passed - No positive pregnancy test in past 12 months          "

## 2019-09-04 RX ORDER — AMLODIPINE BESYLATE 5 MG/1
TABLET ORAL
Qty: 90 TABLET | Refills: 0 | Status: SHIPPED | OUTPATIENT
Start: 2019-09-04 | End: 2019-11-27

## 2019-09-04 NOTE — TELEPHONE ENCOUNTER
Routing refill request to provider for review/approval because:  Labs out of range:  BP  Patient needs to be seen because:  Due for bp recheck, have sent mychart outreach and pt did read it.     Med pended for 90 day supply with reminder.

## 2019-10-17 ENCOUNTER — TELEPHONE (OUTPATIENT)
Dept: FAMILY MEDICINE | Facility: CLINIC | Age: 49
End: 2019-10-17

## 2019-10-17 NOTE — TELEPHONE ENCOUNTER
Panel Management Review      Patient has the following on her problem list:     Hypertension   Last three blood pressure readings:  BP Readings from Last 3 Encounters:   06/12/19 (!) 144/92   09/25/18 130/70   08/30/18 128/88     Blood pressure: FAILED    HTN Guidelines:  Less than 140/90      Composite cancer screening  Chart review shows that this patient is due/due soon for the following None  Summary:    Patient is due/failing the following:   BP CHECK    Action needed:   Patient needs nurse only appointment.    Type of outreach:    Sent [employer] message.    Questions for provider review:    None                                                                                                                                    María Lara CMA       Chart routed to Care Team .

## 2019-10-29 ENCOUNTER — ALLIED HEALTH/NURSE VISIT (OUTPATIENT)
Dept: NURSING | Facility: CLINIC | Age: 49
End: 2019-10-29
Payer: COMMERCIAL

## 2019-10-29 VITALS — SYSTOLIC BLOOD PRESSURE: 126 MMHG | DIASTOLIC BLOOD PRESSURE: 70 MMHG

## 2019-10-29 DIAGNOSIS — I10 ESSENTIAL HYPERTENSION: Primary | ICD-10-CM

## 2019-10-29 PROCEDURE — 99207 ZZC NO CHARGE NURSE ONLY: CPT

## 2019-10-29 NOTE — PROGRESS NOTES
Corinna Barker is a 49 year old patient who comes in today for a Blood Pressure check.  Initial BP:  /70 (BP Location: Right arm, Patient Position: Chair, Cuff Size: Adult Regular)        Disposition: results routed to provider.    Patient notes new medications are going well, recently switched from Lisinopril to Norvasc.  Has no concerns.    Ammon Fitzgerald CMA ([facility])

## 2019-11-03 ENCOUNTER — HEALTH MAINTENANCE LETTER (OUTPATIENT)
Age: 49
End: 2019-11-03

## 2019-11-27 DIAGNOSIS — I10 ESSENTIAL HYPERTENSION: ICD-10-CM

## 2019-11-27 RX ORDER — AMLODIPINE BESYLATE 5 MG/1
TABLET ORAL
Qty: 90 TABLET | Refills: 0 | Status: SHIPPED | OUTPATIENT
Start: 2019-11-27 | End: 2020-02-20

## 2019-11-29 DIAGNOSIS — I10 ESSENTIAL HYPERTENSION: ICD-10-CM

## 2019-11-29 RX ORDER — LOSARTAN POTASSIUM 50 MG/1
TABLET ORAL
Qty: 90 TABLET | Refills: 1 | Status: SHIPPED | OUTPATIENT
Start: 2019-11-29 | End: 2020-05-22

## 2019-11-29 NOTE — TELEPHONE ENCOUNTER
"Requested Prescriptions   Pending Prescriptions Disp Refills     losartan (COZAAR) 50 MG tablet [Pharmacy Med Name: LOSARTAN POTASSIUM 50 MG TAB] 90 tablet 1     Sig: TAKE 1 TABLET BY MOUTH EVERY DAY   Last Written Prescription Date:  4/22/19  Last Fill Quantity: 90,  # refills: 1   Last office visit: 6/12/2019 with prescribing provider:  Shannan Farias Ra, APRN CNP    Future Office Visit:        Angiotensin-II Receptors Passed - 11/29/2019  2:26 AM        Passed - Last blood pressure under 140/90 in past 12 months     BP Readings from Last 3 Encounters:   10/29/19 126/70   06/12/19 (!) 144/92   09/25/18 130/70                 Passed - Recent (12 mo) or future (30 days) visit within the authorizing provider's specialty     Patient has had an office visit with the authorizing provider or a provider within the authorizing providers department within the previous 12 mos or has a future within next 30 days. See \"Patient Info\" tab in inbasket, or \"Choose Columns\" in Meds & Orders section of the refill encounter.              Passed - Medication is active on med list        Passed - Patient is age 18 or older        Passed - No active pregnancy on record        Passed - Normal serum creatinine on file in past 12 months     Recent Labs   Lab Test 04/22/19  0806   CR 0.61             Passed - Normal serum potassium on file in past 12 months     Recent Labs   Lab Test 04/22/19  0806   POTASSIUM 4.0                    Passed - No positive pregnancy test in past 12 months         "

## 2020-02-10 ENCOUNTER — HEALTH MAINTENANCE LETTER (OUTPATIENT)
Age: 50
End: 2020-02-10

## 2020-02-20 DIAGNOSIS — I10 ESSENTIAL HYPERTENSION: ICD-10-CM

## 2020-02-20 RX ORDER — AMLODIPINE BESYLATE 5 MG/1
5 TABLET ORAL DAILY
Qty: 90 TABLET | Refills: 0 | Status: SHIPPED | OUTPATIENT
Start: 2020-02-20 | End: 2020-05-29

## 2020-02-20 NOTE — TELEPHONE ENCOUNTER
"Requested Prescriptions   Pending Prescriptions Disp Refills     AMLODIPINE 5 MG PO tablet [Pharmacy Med Name: AMLODIPINE BESYLATE 5 MG TAB] 90 tablet 0     Sig: TAKE 1 TABLET BY MOUTH EVERY DAY. DUE FOR APPOINTMENT NOVEMBER 2019. NO FURTHER REFILLS   Last Written Prescription Date:  11/27/19  Last Fill Quantity: 90,  # refills: 0   Last office visit: 6/12/2019 with prescribing provider:  Shannan Farias Ra, APRN CNP    Future Office Visit:        Calcium Channel Blockers Protocol  Passed - 2/20/2020  2:47 AM        Passed - Blood pressure under 140/90 in past 12 months     BP Readings from Last 3 Encounters:   10/29/19 126/70   06/12/19 (!) 144/92   09/25/18 130/70                 Passed - Recent (12 mo) or future (30 days) visit within the authorizing provider's specialty     Patient has had an office visit with the authorizing provider or a provider within the authorizing providers department within the previous 12 mos or has a future within next 30 days. See \"Patient Info\" tab in inbasket, or \"Choose Columns\" in Meds & Orders section of the refill encounter.              Passed - Medication is active on med list        Passed - Patient is age 18 or older        Passed - No active pregnancy on record        Passed - Normal serum creatinine on file in past 12 months     Recent Labs   Lab Test 04/22/19  0806   CR 0.61             Passed - No positive pregnancy test in past 12 months         "

## 2020-05-22 DIAGNOSIS — I10 ESSENTIAL HYPERTENSION: ICD-10-CM

## 2020-05-22 NOTE — TELEPHONE ENCOUNTER
Please help schedule med ck. Send back to refill if patient needs coverage until visit.    Urmila Beckett RN

## 2020-05-22 NOTE — TELEPHONE ENCOUNTER
Losartan Potassium 50 mg tab     Last Written Prescription Date:  11/29/19  Last Fill Quantity: 90,   # refills: 1  Last Office Visit: 6/12/19  Future Office visit:       Routing refill request to provider for review/approval because:  Failed labs- patient scheduled med check for 5/29/20    Liana Eason RN on 5/22/2020 at 2:00 PM

## 2020-05-26 RX ORDER — LOSARTAN POTASSIUM 50 MG/1
TABLET ORAL
Qty: 90 TABLET | Refills: 0 | Status: SHIPPED | OUTPATIENT
Start: 2020-05-26 | End: 2020-05-29

## 2020-05-29 ENCOUNTER — VIRTUAL VISIT (OUTPATIENT)
Dept: FAMILY MEDICINE | Facility: CLINIC | Age: 50
End: 2020-05-29
Payer: COMMERCIAL

## 2020-05-29 DIAGNOSIS — I10 ESSENTIAL HYPERTENSION: ICD-10-CM

## 2020-05-29 PROCEDURE — 99213 OFFICE O/P EST LOW 20 MIN: CPT | Mod: GT | Performed by: NURSE PRACTITIONER

## 2020-05-29 RX ORDER — LOSARTAN POTASSIUM 50 MG/1
50 TABLET ORAL DAILY
Qty: 90 TABLET | Refills: 1 | Status: SHIPPED | OUTPATIENT
Start: 2020-05-29 | End: 2021-02-17

## 2020-05-29 RX ORDER — AMLODIPINE BESYLATE 5 MG/1
5 TABLET ORAL DAILY
Qty: 90 TABLET | Refills: 1 | Status: SHIPPED | OUTPATIENT
Start: 2020-05-29 | End: 2020-12-21

## 2020-05-29 NOTE — PROGRESS NOTES
"Corinna Barker is a 49 year old female who is being evaluated via a billable video visit.      The patient has been notified of following:     \"This video visit will be conducted via a call between you and your physician/provider. We have found that certain health care needs can be provided without the need for an in-person physical exam.  This service lets us provide the care you need with a video conversation.  If a prescription is necessary we can send it directly to your pharmacy.  If lab work is needed we can place an order for that and you can then stop by our lab to have the test done at a later time.    Video visits are billed at different rates depending on your insurance coverage.  Please reach out to your insurance provider with any questions.    If during the course of the call the physician/provider feels a video visit is not appropriate, you will not be charged for this service.\"    Patient has given verbal consent for Video visit? Yes    How would you like to obtain your AVS? Mail a copy    Patient would like the video invitation sent by: Text to cell phone: [phone]    Will anyone else be joining your video visit? No    Subjective     Corinna Barker is a 49 year old female who presents today via video visit for the following health issues:    HPI  Hypertension Follow-up      Do you check your blood pressure regularly outside of the clinic? No     Are you following a low salt diet? Yes        How many servings of fruits and vegetables do you eat daily?  0-1    On average, how many sweetened beverages do you drink each day (Examples: soda, juice, sweet tea, etc.  Do NOT count diet or artificially sweetened beverages)?   1    How many days per week do you exercise enough to make your heart beat faster? 3 or less    How many minutes a day do you exercise enough to make your heart beat faster? 9 or less    How many days per week do you miss taking your medication? 0         Video Start Time: 2:19 " "PM    Follow up on medication.  Tolerating well.  She does note she saw the dentist several months ago and they thought perhaps the amlodipine was affecting her gums.  She did not notice anything.  She was supposed to follow up but her appt was cancelled due to covid.  She has not been back.  Denies any chest pain, palpitations, sob.  She does not exercise but is active at work.    Patient Active Problem List   Diagnosis     Essential hypertension     History reviewed. No pertinent surgical history.    Social History     Tobacco Use     Smoking status: Current Every Day Smoker     Packs/day: 0.25     Types: Cigarettes     Smokeless tobacco: Never Used     Tobacco comment: 1 pack a week   Substance Use Topics     Alcohol use: Yes     Family History   Adopted: Yes           Reviewed and updated as needed this visit by Provider         Review of Systems   Constitutional, HEENT, cardiovascular, pulmonary, gi and gu systems are negative, except as otherwise noted.      Objective    There were no vitals taken for this visit.  Estimated body mass index is 28.19 kg/m  as calculated from the following:    Height as of 8/28/18: 1.651 m (5' 5\").    Weight as of 6/12/19: 76.8 kg (169 lb 6.4 oz).  Physical Exam     GENERAL: Healthy, alert and no distress  EYES: Eyes grossly normal to inspection.  No discharge or erythema, or obvious scleral/conjunctival abnormalities.  RESP: No audible wheeze, cough, or visible cyanosis.  No visible retractions or increased work of breathing.    SKIN: Visible skin clear. No significant rash, abnormal pigmentation or lesions.  NEURO: Cranial nerves grossly intact.  Mentation and speech appropriate for age.  PSYCH: Mentation appears normal, affect normal/bright, judgement and insight intact, normal speech and appearance well-groomed.      Diagnostic Test Results:  Labs reviewed in Epic        Assessment & Plan     1. Essential hypertension  Asymptomatic.  Continue with current regimen.  After " recheck with the dentist if they wish for a change to be made regarding her amlodipine she will let me know.  - losartan (COZAAR) 50 MG tablet; Take 1 tablet (50 mg) by mouth daily  Dispense: 90 tablet; Refill: 1  - amLODIPine (NORVASC) 5 MG tablet; Take 1 tablet (5 mg) by mouth daily  Dispense: 90 tablet; Refill: 1     Tobacco Cessation:   reports that she has been smoking cigarettes. She has been smoking about 0.25 packs per day. She has never used smokeless tobacco.      No follow-ups on file.    RICHARD Marks Ra, CNP  [facility]      Video-Visit Details    Type of service:  Video Visit    Video End Time:2:22 PM    Originating Location (pt. Location): Home    Distant Location (provider location):  [facility]     Platform used for Video Visit: Doximity    No follow-ups on file.       RICHARD Marks Ra, CNP

## 2020-11-16 ENCOUNTER — HEALTH MAINTENANCE LETTER (OUTPATIENT)
Age: 50
End: 2020-11-16

## 2020-12-19 DIAGNOSIS — I10 ESSENTIAL HYPERTENSION: ICD-10-CM

## 2020-12-19 NOTE — LETTER
January 4, 2021      Corinna Barker  [address] [address] [address]        Dear Ms. Corinna Barker,    We are contacting you today to notify you that you are due for a nurse only blood pressure check and labs for further refills for your Amlodipine.    Please call [phone] to schedule an appointment.     [facility]      Sincerely,     Shannan Farias, MSN, FNP-BC

## 2020-12-21 RX ORDER — AMLODIPINE BESYLATE 5 MG/1
TABLET ORAL
Qty: 90 TABLET | Refills: 0 | Status: SHIPPED | OUTPATIENT
Start: 2020-12-21 | End: 2021-03-15

## 2020-12-21 NOTE — TELEPHONE ENCOUNTER
Medication is being filled for 1 time refill only due to:  due for nurse BP check and BMP     Please call patient and assist in scheduling appointment. Route to provider for lab orders.    Yashira FROST RN, BSN

## 2021-02-07 ENCOUNTER — HEALTH MAINTENANCE LETTER (OUTPATIENT)
Age: 51
End: 2021-02-07

## 2021-03-15 DIAGNOSIS — I10 ESSENTIAL HYPERTENSION: ICD-10-CM

## 2021-03-15 NOTE — LETTER
March 18, 2021      Corinna Barker  [address] [place] [address]        Dear MsBerhane Barker,    We recently received a call from your pharmacy requesting a refill of your medication Amlodipine.    We are contacting you today to notify you that you are due for a medication check/blood pressure and lab work for further refills.    We have authorized one refill of your medication to allow time for you to schedule your appointment.    Please call [phone] to schedule an appointment or if you have MyChart you can schedule with your provider as well.    Taking care of your health is important to us, an ongoing visits with your provider are vital to your care. We look forward to seeing you in the near future.    Thank you for using [facility] for your Medical Needs.          Sincerely,     Shannan Farias, MSN, FNP-BC

## 2021-03-15 NOTE — TELEPHONE ENCOUNTER
Routing refill request to provider for review/approval because:  Failed protocol for amlodipine - due for bp apppt and creatinine    Will forward to the station, please her her schedule an appt for a med check - bp f/u

## 2021-03-16 RX ORDER — AMLODIPINE BESYLATE 5 MG/1
TABLET ORAL
Qty: 30 TABLET | Refills: 0 | Status: SHIPPED | OUTPATIENT
Start: 2021-03-16 | End: 2021-04-09

## 2021-04-09 DIAGNOSIS — I10 ESSENTIAL HYPERTENSION: ICD-10-CM

## 2021-04-09 RX ORDER — AMLODIPINE BESYLATE 5 MG/1
5 TABLET ORAL DAILY
Qty: 30 TABLET | Refills: 0 | Status: SHIPPED | OUTPATIENT
Start: 2021-04-09 | End: 2021-06-22

## 2021-04-09 NOTE — TELEPHONE ENCOUNTER
I will give one last refill but documented this will be her last without a visit with primary care provider. 30 days only

## 2021-04-09 NOTE — TELEPHONE ENCOUNTER
Routing refill request to provider for review/approval because:  Kianna given x1 and patient did not follow up, please advise. Patient read Tag[employer] message, but has not followed up.  Labs not current:    Creatinine   Date Value Ref Range Status   04/22/2019 0.61 0.52 - 1.04 mg/dL Final     Patient needs to be seen because:  Due for visit.

## 2021-05-07 DIAGNOSIS — I10 ESSENTIAL HYPERTENSION: ICD-10-CM

## 2021-05-07 RX ORDER — AMLODIPINE BESYLATE 5 MG/1
5 TABLET ORAL DAILY
Qty: 30 TABLET | Refills: 0 | OUTPATIENT
Start: 2021-05-07

## 2021-05-23 ENCOUNTER — OFFICE VISIT (OUTPATIENT)
Dept: URGENT CARE | Facility: URGENT CARE | Age: 51
End: 2021-05-23
Payer: COMMERCIAL

## 2021-05-23 VITALS
OXYGEN SATURATION: 99 % | TEMPERATURE: 97.1 F | SYSTOLIC BLOOD PRESSURE: 120 MMHG | HEART RATE: 73 BPM | DIASTOLIC BLOOD PRESSURE: 78 MMHG

## 2021-05-23 DIAGNOSIS — M62.830 BACK MUSCLE SPASM: ICD-10-CM

## 2021-05-23 DIAGNOSIS — M54.50 ACUTE MIDLINE LOW BACK PAIN WITHOUT SCIATICA: Primary | ICD-10-CM

## 2021-05-23 PROCEDURE — 99214 OFFICE O/P EST MOD 30 MIN: CPT | Performed by: FAMILY MEDICINE

## 2021-05-23 RX ORDER — NAPROXEN 500 MG/1
500 TABLET ORAL 2 TIMES DAILY WITH MEALS
Qty: 60 TABLET | Refills: 0 | Status: SHIPPED | OUTPATIENT
Start: 2021-05-23 | End: 2021-06-22

## 2021-05-23 RX ORDER — CYCLOBENZAPRINE HCL 5 MG
5-10 TABLET ORAL EVERY 8 HOURS PRN
Qty: 30 TABLET | Refills: 0 | Status: SHIPPED | OUTPATIENT
Start: 2021-05-23 | End: 2021-06-22

## 2021-05-23 NOTE — PATIENT INSTRUCTIONS
Patient Education     Understanding Sacroiliac Strain    A joint is a place where 2 bones meet. The 2 sacroiliac joints are where the hip (iliac) bones meet the bottom part of the spine (sacrum). These joints are surrounded by muscle, connective tissue, and nerves. Normally, a sacroiliac joint (SIJ) doesn't move very much. But it can be pushed out of alignment. The tissues around an SIJ also can be stretched or torn. This can lead to pain in the low back.    How to say it  [id number] strain   Causes of sacroiliac strain  Causes of SIJ strain can include:    Stress on the SIJ from lifting weight incorrectly    Poor body mechanics and posture during sports or work activities    Damage from degenerative diseases such as arthritis    Increased pressure on the SIJ from pregnancy  Symptoms of sacroiliac strain  Symptoms of SIJ strain may include:    Aching in the low back, buttocks, or upper leg    Pain that gets worse with movement or standing for a long time, and gets better with rest    Inability to move as freely as usual    Muscle spasms in the low back  Treating sacroiliac strain  Treatment focuses on reducing pain and preventing further injury. Treatments may include:     Prescription or over-the-counter medicines. These help reduce pain and swelling. NSAIDs (nonsteroidal anti-inflammatory drugs) are the most common medicines used. Medicines may be prescribed or bought over the counter. They may be given as pills. Or they may be put on the skin as a gel, cream, or patch.    Cold packs or heat packs. These help reduce pain and swelling.    Stretching and other exercises. These improve flexibility and strength.    Physical therapy. This may include exercises or other treatments.    An SIJ belt. This medical device is worn around the hips, to make the SIJ more stable and reduce pain.    Injections of medicine. This may relieve symptoms. The medicine is usually a corticosteroid. This is a strong  anti-inflammatory medicine.  Possible complications of sacroiliac strain  If the cause of the pain is not addressed, symptoms may return or get worse. Follow your healthcare provider s instructions on lifestyle changes and treating your SIJ strain.   When to call your healthcare provider  Call your healthcare provider right away if you have any of these:    Fever of 100.4 F (38 C) or higher, or as directed by your provider    Chills    Redness or swelling    Pain that gets worse    Symptoms that don t get better with prescribed medicines, or get worse    New symptoms  Darby last reviewed this educational content on 6/1/2019 2000-2021 [employer]. All rights reserved. This information is not intended as a substitute for professional medical care. Always follow your healthcare professional's instructions.

## 2021-05-23 NOTE — PROGRESS NOTES
SUBJECTIVE:  Chief Complaint   Patient presents with     Back Pain     Pt states she bent over to move something and heard a pop. No hx of back injury     Corinna Barker is a 50 year old female who presents with a chief complaint of back pain.    Was picking up bag - no more than 4 pounds, twisted to side and developed acute pain, heard a popping sound, states similar to when you crack your knuckle.  This was around 11 am.  Patient has not tried anything for this, has been progressively gotten worse.Endorsed mild radiation to buttock area.  Had prior sciatica before and states that this is not the same.      Had similar back pain about 2 months ago, occurred when sleeping, turning and ended up with pain.  Patient seen by chiropractor and this did improve after 4 days.    Past Medical History:   Diagnosis Date     Hypertension      Current Outpatient Medications   Medication Sig Dispense Refill     amLODIPine (NORVASC) 5 MG tablet Take 1 tablet (5 mg) by mouth daily LAST REFILL WITHOUT APPOINTMENT 30 tablet 0     losartan (COZAAR) 50 MG tablet TAKE 1 TABLET BY MOUTH EVERY DAY 90 tablet 0     Social History     Tobacco Use     Smoking status: Current Every Day Smoker     Packs/day: 0.25     Types: Cigarettes     Smokeless tobacco: Never Used     Tobacco comment: 1 pack a week   Substance Use Topics     Alcohol use: Yes       ROS:  Review of systems negative except as stated above.    EXAM:   /78   Pulse 73   Temp 97.1  F (36.2  C) (Tympanic)   LMP 05/20/2021   SpO2 99%   M/S Exam:back - no tenderness along lumbar spine, mild discomfort as sacroiliac joint on left side.  No tenderness at buttock area.  Mild discomfort left paraspinal muscles.  Decrease ROM  GENERAL APPEARANCE: healthy, alert and mild distress  EXTREMITIES: peripheral pulses normal  SKIN: no suspicious lesions or rashes  PSYCH: alert, affect bright    X-RAY was not done.    ASSESSMENT/PLAN:  (M54.5) Acute midline low back pain without  sciatica  (primary encounter diagnosis)  Comment: left sided sacroiliac strain  Plan: naproxen (NAPROSYN) 500 MG tablet            (M62.830) Back muscle spasm  Plan: cyclobenzaprine (FLEXERIL) 5 MG tablet            Reassurance given, reviewed that symptoms most likely due to sacroiliac strain.  Encourage tylenol, ice, rest.  RX naproxen 500 mg given, RX flexeril 5 mg given to help with back muscle spasms. Okay to follow up with chiropractor for treatment.    Follow up with primary provider if no improvement of symptoms in 1 week    Danny Rivera MD  May 23, 2021 2:13 PM

## 2021-06-13 DIAGNOSIS — M54.50 ACUTE MIDLINE LOW BACK PAIN WITHOUT SCIATICA: ICD-10-CM

## 2021-06-15 DIAGNOSIS — I10 ESSENTIAL HYPERTENSION: ICD-10-CM

## 2021-06-15 RX ORDER — NAPROXEN 500 MG/1
TABLET ORAL
Qty: 60 TABLET | Refills: 0 | OUTPATIENT
Start: 2021-06-15

## 2021-06-15 NOTE — TELEPHONE ENCOUNTER
Urgent care provider, inform pt follow up with PCP if needed, urgent care does not do ongoing refills  Elsa Warren RN, BSN  Message handled by CLINIC NURSE.

## 2021-06-16 RX ORDER — AMLODIPINE BESYLATE 5 MG/1
5 TABLET ORAL DAILY
Qty: 30 TABLET | Refills: 0 | OUTPATIENT
Start: 2021-06-16

## 2021-06-16 NOTE — TELEPHONE ENCOUNTER
Call to pt to schedule-   Next 5 appointments (look out 90 days)    Jun 22, 2021  9:00 AM  PHYSICAL with RICHARD Marks Ra, CNP  [facility] ([facility] [place] ) [address] [address] [phone]        She said she does not need the medication to get her to her appointment.     Eli OAKLEY RN

## 2021-06-22 ENCOUNTER — OFFICE VISIT (OUTPATIENT)
Dept: FAMILY MEDICINE | Facility: CLINIC | Age: 51
End: 2021-06-22
Payer: COMMERCIAL

## 2021-06-22 VITALS
WEIGHT: 178.6 LBS | BODY MASS INDEX: 30.49 KG/M2 | SYSTOLIC BLOOD PRESSURE: 110 MMHG | TEMPERATURE: 98.1 F | HEIGHT: 64 IN | RESPIRATION RATE: 16 BRPM | HEART RATE: 82 BPM | DIASTOLIC BLOOD PRESSURE: 80 MMHG | OXYGEN SATURATION: 99 %

## 2021-06-22 DIAGNOSIS — F17.200 NICOTINE DEPENDENCE, UNCOMPLICATED, UNSPECIFIED NICOTINE PRODUCT TYPE: ICD-10-CM

## 2021-06-22 DIAGNOSIS — Z12.31 ENCOUNTER FOR SCREENING MAMMOGRAM FOR BREAST CANCER: Primary | ICD-10-CM

## 2021-06-22 DIAGNOSIS — I10 ESSENTIAL HYPERTENSION: ICD-10-CM

## 2021-06-22 PROCEDURE — 99213 OFFICE O/P EST LOW 20 MIN: CPT | Performed by: NURSE PRACTITIONER

## 2021-06-22 RX ORDER — AMLODIPINE BESYLATE 5 MG/1
5 TABLET ORAL DAILY
Qty: 90 TABLET | Refills: 3 | Status: SHIPPED | OUTPATIENT
Start: 2021-06-22 | End: 2022-07-12

## 2021-06-22 RX ORDER — LOSARTAN POTASSIUM 50 MG/1
50 TABLET ORAL DAILY
Qty: 90 TABLET | Refills: 3 | Status: SHIPPED | OUTPATIENT
Start: 2021-06-22 | End: 2021-07-29

## 2021-06-22 ASSESSMENT — ENCOUNTER SYMPTOMS
SHORTNESS OF BREATH: 0
WEAKNESS: 0
PALPITATIONS: 0
PARESTHESIAS: 0
CHILLS: 0
DIARRHEA: 0
MYALGIAS: 1
NERVOUS/ANXIOUS: 0
FEVER: 0
FREQUENCY: 0
HEARTBURN: 0
HEMATURIA: 0
DYSURIA: 0
HEMATOCHEZIA: 0
COUGH: 0
ARTHRALGIAS: 0
SORE THROAT: 0
ABDOMINAL PAIN: 0
CONSTIPATION: 0
NAUSEA: 0
HEADACHES: 0
DIZZINESS: 0
JOINT SWELLING: 0

## 2021-06-22 ASSESSMENT — MIFFLIN-ST. JEOR: SCORE: 1416.15

## 2021-06-22 ASSESSMENT — PAIN SCALES - GENERAL: PAINLEVEL: NO PAIN (0)

## 2021-06-22 NOTE — PROGRESS NOTES
"    Assessment & Plan     Essential hypertension  Well controlled, tolerates medications well.  Refilled.  - amLODIPine (NORVASC) 5 MG tablet; Take 1 tablet (5 mg) by mouth daily  - losartan (COZAAR) 50 MG tablet; Take 1 tablet (50 mg) by mouth daily    Encounter for screening mammogram for breast cancer  - MA SCREENING DIGITAL BILAT - Future  (s+30); Future             BMI:   Estimated body mass index is 30.9 kg/m  as calculated from the following:    Height as of this encounter: 1.619 m (5' 3.75\").    Weight as of this encounter: 81 kg (178 lb 9.6 oz).           Return in about 3 weeks (around 7/13/2021) for Physical Exam.    Shannan Farias, RICHARD CNP  [facility] SHANIA Weinstein is a 50 year old who presents for the following health issues     HPI     BP recheck  No chest pain, palpitations, sob, LE edema.  Compliant with medications.  Smoking, plans on quitting in 3 months.  No concerns.  No regular exercise.    Review of Systems   Constitutional, HEENT, cardiovascular, pulmonary, gi and gu systems are negative, except as otherwise noted.      Objective    /80 (BP Location: Right arm, Patient Position: Sitting, Cuff Size: Adult Large)   Pulse 82   Temp 98.1  F (36.7  C) (Oral)   Resp 16   Ht 1.619 m (5' 3.75\")   Wt 81 kg (178 lb 9.6 oz)   LMP 05/24/2021 (Approximate)   SpO2 99%   Breastfeeding No   BMI 30.90 kg/m    Body mass index is 30.9 kg/m .  Physical Exam   GENERAL: healthy, alert and no distress  RESP: lungs clear to auscultation - no rales, rhonchi or wheezes  CV: regular rate and rhythm, normal S1 S2, no S3 or S4, no murmur, click or rub, no peripheral edema and peripheral pulses strong  ABDOMEN: soft, nontender, no hepatosplenomegaly, no masses and bowel sounds normal  PSYCH: mentation appears normal, affect normal/bright                "

## 2021-06-22 NOTE — PROGRESS NOTES
SUBJECTIVE:   CC: Corinna Barker is an 50 year old woman who presents for preventive health visit.       Patient has been advised of split billing requirements and indicates understanding: Yes  Healthy Habits:     Getting at least 3 servings of Calcium per day:  Yes    Bi-annual eye exam:  NO    Dental care twice a year:  Yes    Sleep apnea or symptoms of sleep apnea:  None    Diet:  Regular (no restrictions)    Frequency of exercise:  None    Taking medications regularly:  Yes    Medication side effects:  None    PHQ-2 Total Score: 0    Additional concerns today:  No      {Outside tests to abstract? :355146}    {additional problems to add (Optional):258977}    Today's PHQ-2 Score:   PHQ-2 ( 1999 Pfizer) 6/22/2021   Q1: Little interest or pleasure in doing things 0   Q2: Feeling down, depressed or hopeless 0   PHQ-2 Score 0   Q1: Little interest or pleasure in doing things Not at all   Q2: Feeling down, depressed or hopeless Not at all   PHQ-2 Score 0       Abuse: Current or Past (Physical, Sexual or Emotional) - No  Do you feel safe in your environment? Yes    Have you ever done Advance Care Planning? (For example, a Health Directive, POLST, or a discussion with a medical provider or your loved ones about your wishes): Yes, patient states has an Advance Care Planning document and will bring a copy to the clinic.    Social History     Tobacco Use     Smoking status: Current Every Day Smoker     Packs/day: 1.00     Types: Cigarettes     Smokeless tobacco: Never Used     Tobacco comment: 1 pack a week   Substance Use Topics     Alcohol use: Yes     If you drink alcohol do you typically have >3 drinks per day or >7 drinks per week? No    Alcohol Use 6/22/2021   Prescreen: >3 drinks/day or >7 drinks/week? No   {add AUDIT responses (Optional) (A score of 7 for adult men is an indication of hazardous drinking; a score of 8 or more is an indication of an alcohol use disorder.  A score of 7 or more for adult women is  "an indication of hazardous drinking or an alchohol use disorder):047985}    Reviewed orders with patient.  Reviewed health maintenance and updated orders accordingly - Yes  {Chronicprobdata (optional):095080}    Breast Cancer Screening:    Breast CA Risk Assessment (FHS-7) 6/22/2021   Do you have a family history of breast, colon, or ovarian cancer? No / Unknown       {If any of the questions to the BCRA (FHS-7) are answered yes, consider ordering referral for genetic counseling (Optional) :185192::\"click delete button to remove this line now\"}  {AMB Mammogram Decision Support (Optional) :323058}  Pertinent mammograms are reviewed under the imaging tab.    History of abnormal Pap smear: { :045232}     Reviewed and updated as needed this visit by clinical staff    Meds   Med Hx   Fam Hx          Reviewed and updated as needed this visit by Provider                {HISTORY OPTIONS (Optional):490307}    Review of Systems   Constitutional: Negative for chills and fever.   HENT: Negative for congestion, ear pain, hearing loss and sore throat.    Eyes: Negative for visual disturbance.   Respiratory: Negative for cough and shortness of breath.    Cardiovascular: Negative for chest pain, palpitations and peripheral edema.   Gastrointestinal: Negative for abdominal pain, constipation, diarrhea, heartburn, hematochezia and nausea.   Genitourinary: Negative for dysuria, frequency, genital sores, hematuria and urgency.   Musculoskeletal: Positive for myalgias. Negative for arthralgias and joint swelling.   Skin: Negative for rash.   Neurological: Negative for dizziness, weakness, headaches and paresthesias.   Psychiatric/Behavioral: Negative for mood changes. The patient is not nervous/anxious.      {FEMALE ROS (Optional):401958}     OBJECTIVE:   /80 (BP Location: Right arm, Patient Position: Sitting, Cuff Size: Adult Large)   Pulse 82   Temp 98.1  F (36.7  C) (Oral)   Resp 16   Ht 1.619 m (5' 3.75\")   Wt 81 kg " "(178 lb 9.6 oz)   LMP 05/24/2021 (Approximate)   SpO2 99%   Breastfeeding No   BMI 30.90 kg/m    Physical Exam  {Exam Choices (Optional):476259}    {Diagnostic Test Results (Optional):017701::\"Diagnostic Test Results:\",\"Labs reviewed in Epic\"}    ASSESSMENT/PLAN:   {Diag Picklist:362104}    Patient has been advised of split billing requirements and indicates understanding: {YES / NO:610868::\"Yes\"}  COUNSELING:  {FEMALE COUNSELING MESSAGES:316175::\"Reviewed preventive health counseling, as reflected in patient instructions\"}    Estimated body mass index is 30.9 kg/m  as calculated from the following:    Height as of this encounter: 1.619 m (5' 3.75\").    Weight as of this encounter: 81 kg (178 lb 9.6 oz).    {Weight Management Plan (ACO) Complete if BMI is abnormal-  Ages 18-64  BMI >24.9.  Age 65+ with BMI <23 or >30 (Optional):649563}    She reports that she has been smoking cigarettes. She has been smoking about 1.00 pack per day. She has never used smokeless tobacco.  Tobacco Cessation Action Plan:   {TOBACCO CESSATION ACTION PLAN:432979}      Counseling Resources:  ATP IV Guidelines  Pooled Cohorts Equation Calculator  Breast Cancer Risk Calculator  BRCA-Related Cancer Risk Assessment: FHS-7 Tool  FRAX Risk Assessment  ICSI Preventive Guidelines  Dietary Guidelines for Americans, 2010  USDA's MyPlate  ASA Prophylaxis  Lung CA Screening    RICHARD Marks Ra [facility]  "

## 2021-06-22 NOTE — PATIENT INSTRUCTIONS
Preventive Health Recommendations  Female Ages 40 to 49    Yearly exam:     See your health care provider every year in order to  1. Review health changes.   2. Discuss preventive care.    3. Review your medicines if your doctor prescribed any.      Get a Pap test every three years (unless you have an abnormal result and your provider advises testing more often).      If you get Pap tests with HPV test, you only need to test every 5 years, unless you have an abnormal result. You do not need a Pap test if your uterus was removed (hysterectomy) and you have not had cancer.      You should be tested each year for STDs (sexually transmitted diseases), if you're at risk.     Ask your doctor if you should have a mammogram.      Have a colonoscopy (test for colon cancer) if someone in your family has had colon cancer or polyps before age 50.       Have a cholesterol test every 5 years.       Have a diabetes test (fasting glucose) after age 45. If you are at risk for diabetes, you should have this test every 3 years.    Shots: Get a flu shot each year. Get a tetanus shot every 10 years.     Nutrition:     Eat at least 5 servings of fruits and vegetables each day.    Eat whole-grain bread, whole-wheat pasta and brown rice instead of white grains and rice.    Get adequate Calcium and Vitamin D.      Lifestyle    Exercise at least 150 minutes a week (an average of 30 minutes a day, 5 days a week). This will help you control your weight and prevent disease.    Limit alcohol to one drink per day.    No smoking.     Wear sunscreen to prevent skin cancer.    See your dentist every six months for an exam and cleaning.  HOW TO QUIT SMOKING  Smoking is one of the hardest habits to break. About half of all those who have ever smoked have been able to quit, and most of those (about 70%) who still smoke want to quit. Here are some of the best ways to stop smoking.     KEEP TRYING:  It takes most smokers about 8 tries before they are  finally able to fully quit. So, the more often you try and fail, the better your chance of quitting the next time! So, don't give up!    GO COLD TURKEY:  Most ex-smokers quit cold turkey. Trying to cut back gradually doesn't seem to work as well, perhaps because it continues the smoking habit. Also, it is possible to fool yourself by inhaling more while smoking fewer cigarettes. This results in the same amount of nicotine in your body!    GET SUPPORT:  Support programs can make an important difference, especially for the heavy smoker. These groups offer lectures, methods to change your behavior and peer support. Call the free national Quitline for more information. 800-QUIT-NOW (629-773-1081). Low-cost or free programs are offered by many hospitals, local chapters of the American Lung Association (860-201-9803) and the American Cancer Society (528-687-0642). Support at home is important too. Non-smokers can help by offering praise and encouragement. If the smoker fails to quit, encourage them to try again!    OVER-THE-COUNTER MEDICINES:  For those who can't quit on their own, Nicotine Replacement Therapy (NRT) may make quitting much easier. Certain aids such as the nicotine patch, gum and lozenge are available without a prescription. However, it is best to use these under the guidance of your doctor. The skin patch provides a steady supply of nicotine to the body. Nicotine gum and lozenge gives temporary bursts of low levels of nicotine. Both methods take the edge off the craving for cigarettes. WARNING: If you feel symptoms of nicotine overdose, such as nausea, vomiting, dizziness, weakness, or fast heartbeat, stop using these and see your doctor.    PRESCRIPTION MEDICINES:  After evaluating your smoking patterns and prior attempts at quitting, your doctor may offer a prescription medicine such as bupropion (Zyban, Wellbutrin), varenicline (Chantix, Champix), a niocotine inhaler or nasal spray. Each has its unique  advantage and side effects which your doctor can review with you.    HEALTH BENEFITS OF QUITTING:  The benefits of quitting start right away and keep improving the longer you go without smokin minutes: blood pressure and pulse return to normal  8 hours: oxygen levels return to normal  2 days: ability to smell and taste begins to improve as damaged nerves start to regrow  2-3 weeks: circulation and lung function improves  1-9 months: decreased cough, congestion and shortness of breath; less tired  1 year: risk of heart attack decreases by half  5 years: risk of lung cancer decreases by half; risk of stroke becomes the same as a non-smoker  For information about how to quit smoking, visit the following links:  National Cancer [place] ,   Clearing the Air, Quit Smoking Today   - an online booklet. http://www.smokefree.gov/pubs/clearing_the_air.pdf  Smokefree.gov http://smokefree.gov/  QuitNet http://www.quitnet.com/    3155-0612 [facility], [address] [place] [address], [place] [address]. All rights reserved. This information is not intended as a substitute for professional medical care. Always follow your healthcare professional's instructions.

## 2021-07-16 ENCOUNTER — ANCILLARY PROCEDURE (OUTPATIENT)
Dept: MAMMOGRAPHY | Facility: CLINIC | Age: 51
End: 2021-07-16
Payer: COMMERCIAL

## 2021-07-16 DIAGNOSIS — Z12.31 ENCOUNTER FOR SCREENING MAMMOGRAM FOR BREAST CANCER: ICD-10-CM

## 2021-07-16 PROCEDURE — 77063 BREAST TOMOSYNTHESIS BI: CPT | Mod: TC | Performed by: RADIOLOGY

## 2021-07-16 PROCEDURE — 77067 SCR MAMMO BI INCL CAD: CPT | Mod: TC | Performed by: RADIOLOGY

## 2021-07-22 ASSESSMENT — ENCOUNTER SYMPTOMS
PARESTHESIAS: 0
FREQUENCY: 0
CHILLS: 0
SHORTNESS OF BREATH: 0
HEMATURIA: 0
NERVOUS/ANXIOUS: 0
PALPITATIONS: 0
SORE THROAT: 0
BREAST MASS: 0
WEAKNESS: 0
HEARTBURN: 0
HEADACHES: 0
COUGH: 0
ARTHRALGIAS: 0
CONSTIPATION: 0
MYALGIAS: 0
ABDOMINAL PAIN: 0
NAUSEA: 0
DIZZINESS: 0
DYSURIA: 0
EYE PAIN: 0
FEVER: 0
DIARRHEA: 0
JOINT SWELLING: 0
HEMATOCHEZIA: 0

## 2021-07-29 ENCOUNTER — OFFICE VISIT (OUTPATIENT)
Dept: FAMILY MEDICINE | Facility: CLINIC | Age: 51
End: 2021-07-29
Payer: COMMERCIAL

## 2021-07-29 VITALS
TEMPERATURE: 98 F | OXYGEN SATURATION: 99 % | WEIGHT: 178 LBS | DIASTOLIC BLOOD PRESSURE: 92 MMHG | SYSTOLIC BLOOD PRESSURE: 131 MMHG | HEART RATE: 82 BPM | BODY MASS INDEX: 30.79 KG/M2 | RESPIRATION RATE: 16 BRPM

## 2021-07-29 DIAGNOSIS — Z13.6 CARDIOVASCULAR SCREENING; LDL GOAL LESS THAN 160: ICD-10-CM

## 2021-07-29 DIAGNOSIS — I10 ESSENTIAL HYPERTENSION: ICD-10-CM

## 2021-07-29 DIAGNOSIS — Z12.4 SCREENING FOR MALIGNANT NEOPLASM OF CERVIX: ICD-10-CM

## 2021-07-29 DIAGNOSIS — Z00.00 ROUTINE GENERAL MEDICAL EXAMINATION AT A HEALTH CARE FACILITY: Primary | ICD-10-CM

## 2021-07-29 LAB
ANION GAP SERPL CALCULATED.3IONS-SCNC: 4 MMOL/L (ref 3–14)
BUN SERPL-MCNC: 10 MG/DL (ref 7–30)
CALCIUM SERPL-MCNC: 8.9 MG/DL (ref 8.5–10.1)
CHLORIDE BLD-SCNC: 106 MMOL/L (ref 94–109)
CHOLEST SERPL-MCNC: 206 MG/DL
CO2 SERPL-SCNC: 25 MMOL/L (ref 20–32)
CREAT SERPL-MCNC: 0.63 MG/DL (ref 0.52–1.04)
FASTING STATUS PATIENT QL REPORTED: YES
GFR SERPL CREATININE-BSD FRML MDRD: >90 ML/MIN/1.73M2
GLUCOSE BLD-MCNC: 103 MG/DL (ref 70–99)
HDLC SERPL-MCNC: 57 MG/DL
LDLC SERPL CALC-MCNC: 129 MG/DL
NONHDLC SERPL-MCNC: 149 MG/DL
POTASSIUM BLD-SCNC: 3.7 MMOL/L (ref 3.4–5.3)
SODIUM SERPL-SCNC: 135 MMOL/L (ref 133–144)
TRIGL SERPL-MCNC: 99 MG/DL

## 2021-07-29 PROCEDURE — 99213 OFFICE O/P EST LOW 20 MIN: CPT | Mod: 25 | Performed by: NURSE PRACTITIONER

## 2021-07-29 PROCEDURE — 80048 BASIC METABOLIC PNL TOTAL CA: CPT | Performed by: NURSE PRACTITIONER

## 2021-07-29 PROCEDURE — 87624 HPV HI-RISK TYP POOLED RSLT: CPT | Performed by: NURSE PRACTITIONER

## 2021-07-29 PROCEDURE — G0145 SCR C/V CYTO,THINLAYER,RESCR: HCPCS | Performed by: NURSE PRACTITIONER

## 2021-07-29 PROCEDURE — 99396 PREV VISIT EST AGE 40-64: CPT | Performed by: NURSE PRACTITIONER

## 2021-07-29 PROCEDURE — 36415 COLL VENOUS BLD VENIPUNCTURE: CPT | Performed by: NURSE PRACTITIONER

## 2021-07-29 PROCEDURE — 80061 LIPID PANEL: CPT | Performed by: NURSE PRACTITIONER

## 2021-07-29 RX ORDER — IRBESARTAN 150 MG/1
150 TABLET ORAL AT BEDTIME
Qty: 90 TABLET | Refills: 3 | Status: SHIPPED | OUTPATIENT
Start: 2021-07-29 | End: 2022-07-22

## 2021-07-29 ASSESSMENT — ENCOUNTER SYMPTOMS
DYSURIA: 0
BREAST MASS: 0
DIZZINESS: 0
COUGH: 0
DIARRHEA: 0
MYALGIAS: 0
HEADACHES: 0
PARESTHESIAS: 0
PALPITATIONS: 0
NAUSEA: 0
JOINT SWELLING: 0
NERVOUS/ANXIOUS: 0
ABDOMINAL PAIN: 0
HEARTBURN: 0
WEAKNESS: 0
FREQUENCY: 0
CHILLS: 0
HEMATOCHEZIA: 0
SHORTNESS OF BREATH: 0
SORE THROAT: 0
FEVER: 0
EYE PAIN: 0
CONSTIPATION: 0
ARTHRALGIAS: 0
HEMATURIA: 0

## 2021-07-29 ASSESSMENT — PAIN SCALES - GENERAL: PAINLEVEL: NO PAIN (0)

## 2021-07-29 NOTE — PROGRESS NOTES
SUBJECTIVE:   CC: Corinna Barker is an 50 year old woman who presents for preventive health visit.       Patient has been advised of split billing requirements and indicates understanding: Yes  Healthy Habits:     Getting at least 3 servings of Calcium per day:  Yes    Bi-annual eye exam:  NO    Dental care twice a year:  Yes    Sleep apnea or symptoms of sleep apnea:  None    Diet:  Regular (no restrictions)    Frequency of exercise:  1 day/week    Duration of exercise:  15-30 minutes    Taking medications regularly:  Yes    Medication side effects:  None    PHQ-2 Total Score: 0    Additional concerns today:  No    Compliant with bp meds.  Tolerates well.  No change in diet or activity.  No chest pain, palpitations, sob.      Today's PHQ-2 Score:   PHQ-2 ( 1999 Pf[employer]) 7/22/2021   Q1: Little interest or pleasure in doing things 0   Q2: Feeling down, depressed or hopeless 0   PHQ-2 Score 0   Q1: Little interest or pleasure in doing things Not at all   Q2: Feeling down, depressed or hopeless Not at all   PHQ-2 Score 0       Abuse: Current or Past (Physical, Sexual or Emotional) - No  Do you feel safe in your environment? Yes    Have you ever done Advance Care Planning? (For example, a Health Directive, POLST, or a discussion with a medical provider or your loved ones about your wishes):     Social History     Tobacco Use     Smoking status: Current Every Day Smoker     Packs/day: 1.00     Types: Cigarettes     Smokeless tobacco: Never Used     Tobacco comment: 1 pack a week   Substance Use Topics     Alcohol use: Yes     If you drink alcohol do you typically have >3 drinks per day or >7 drinks per week? No    Alcohol Use 7/29/2021   Prescreen: >3 drinks/day or >7 drinks/week? -   Prescreen: >3 drinks/day or >7 drinks/week? No       Reviewed orders with patient.  Reviewed health maintenance and updated orders accordingly - Yes  Patient Active Problem List   Diagnosis     Essential hypertension     History  reviewed. No pertinent surgical history.    Social History     Tobacco Use     Smoking status: Current Every Day Smoker     Packs/day: 1.00     Types: Cigarettes     Smokeless tobacco: Never Used     Tobacco comment: 1 pack a week   Substance Use Topics     Alcohol use: Yes     Family History   Adopted: Yes           Breast Cancer Screening:    Breast CA Risk Assessment (FHS-7) 6/22/2021 7/16/2021   Do you have a family history of breast, colon, or ovarian cancer? No / Unknown No / Unknown         Mammogram Screening: Recommended annual mammography  Pertinent mammograms are reviewed under the imaging tab.    History of abnormal Pap smear: NO - age 30-65 PAP every 5 years with negative HPV co-testing recommended     Reviewed and updated as needed this visit by clinical staff  Tobacco  Allergies  Meds   Med Hx  Surg Hx  Fam Hx  Soc Hx        Reviewed and updated as needed this visit by Provider                    Review of Systems   Constitutional: Negative for chills and fever.   HENT: Negative for congestion, ear pain, hearing loss and sore throat.    Eyes: Negative for pain and visual disturbance.   Respiratory: Negative for cough and shortness of breath.    Cardiovascular: Negative for chest pain, palpitations and peripheral edema.   Gastrointestinal: Negative for abdominal pain, constipation, diarrhea, heartburn, hematochezia and nausea.   Breasts:  Negative for tenderness, breast mass and discharge.   Genitourinary: Negative for dysuria, frequency, genital sores, hematuria, pelvic pain, urgency, vaginal bleeding and vaginal discharge.   Musculoskeletal: Negative for arthralgias, joint swelling and myalgias.   Skin: Negative for rash.   Neurological: Negative for dizziness, weakness, headaches and paresthesias.   Psychiatric/Behavioral: Negative for mood changes. The patient is not nervous/anxious.           OBJECTIVE:   BP (!) 131/92 (BP Location: Right arm, Patient Position: Sitting, Cuff Size: Adult  Regular)   Pulse 82   Temp 98  F (36.7  C) (Oral)   Resp 16   Wt 80.7 kg (178 lb)   LMP 07/11/2021 (Approximate)   SpO2 99%   BMI 30.79 kg/m    Physical Exam  GENERAL: healthy, alert and no distress  EYES: Eyes grossly normal to inspection  HENT: ear canals and TM's normal, nose and mouth without ulcers or lesions  NECK: no adenopathy, no asymmetry, masses, or scars and thyroid normal to palpation  RESP: lungs clear to auscultation - no rales, rhonchi or wheezes  CV: regular rate and rhythm, normal S1 S2, no S3 or S4, no murmur, click or rub, no peripheral edema and peripheral pulses strong  ABDOMEN: soft, nontender, no hepatosplenomegaly, no masses and bowel sounds normal   (female): normal female external genitalia, normal urethral meatus, vaginal mucosa pink, moist, well rugated, and normal cervix/adnexa/uterus without masses or discharge  NEURO: Normal strength and tone, mentation intact and speech normal  PSYCH: mentation appears normal, affect normal/bright    Diagnostic Test Results:  Labs reviewed in Epic    ASSESSMENT/PLAN:       ICD-10-CM    1. Routine general medical examination at a health care facility  Z00.00    2. Screening for malignant neoplasm of cervix  Z12.4 Pap imaged thin layer screen with HPV - recommended age 30 - 65 years   3. Essential hypertension  I10 **Basic metabolic panel FUTURE 2mo     irbesartan (AVAPRO) 150 MG tablet     **Basic metabolic panel FUTURE 2mo   4. CARDIOVASCULAR SCREENING; LDL GOAL LESS THAN 160  Z13.6 Lipid panel reflex to direct LDL Fasting     Lipid panel reflex to direct LDL Fasting     Stop losartan, start irbesartan.  BP check in 3-4 weeks.  May need dose adjustment.  Pt agrees with plan and verbalized understanding.    Patient has been advised of split billing requirements and indicates understanding: Yes  COUNSELING:  Reviewed preventive health counseling, as reflected in patient instructions    Estimated body mass index is 30.79 kg/m  as calculated  "from the following:    Height as of 6/22/21: 1.619 m (5' 3.75\").    Weight as of this encounter: 80.7 kg (178 lb).        She reports that she has been smoking cigarettes. She has been smoking about 1.00 pack per day. She has never used smokeless tobacco.  Tobacco Cessation Action Plan:         Counseling Resources:  ATP IV Guidelines  Pooled Cohorts Equation Calculator  Breast Cancer Risk Calculator  BRCA-Related Cancer Risk Assessment: FHS-7 Tool  FRAX Risk Assessment  ICSI Preventive Guidelines  Dietary Guidelines for Americans, 2010  USDA's MyPlate  ASA Prophylaxis  Lung CA Screening    RICHARD Marks Ra CNP  [facility]  "

## 2021-08-02 LAB
BKR LAB AP GYN ADEQUACY: NORMAL
BKR LAB AP GYN INTERPRETATION: NORMAL
BKR LAB AP HPV REFLEX: NORMAL
BKR LAB AP LMP: NORMAL
BKR LAB AP PREVIOUS ABNORMAL: NORMAL
PATH REPORT.COMMENTS IMP SPEC: NORMAL
PATH REPORT.RELEVANT HX SPEC: NORMAL

## 2021-08-04 LAB
HUMAN PAPILLOMA VIRUS 16 DNA: NEGATIVE
HUMAN PAPILLOMA VIRUS 18 DNA: NEGATIVE
HUMAN PAPILLOMA VIRUS FINAL DIAGNOSIS: NORMAL
HUMAN PAPILLOMA VIRUS OTHER HR: NEGATIVE

## 2021-08-31 ENCOUNTER — TELEPHONE (OUTPATIENT)
Dept: FAMILY MEDICINE | Facility: CLINIC | Age: 51
End: 2021-08-31

## 2021-08-31 DIAGNOSIS — I10 ESSENTIAL HYPERTENSION: Primary | ICD-10-CM

## 2021-08-31 NOTE — TELEPHONE ENCOUNTER
Reason for Call:  Other call back    Detailed comments: per notes on 07/29/21 pt is suppose to return to get labs done, no orders. What type of labs are suppose to be needed.    Phone Number Patient can be reached at: Home number on file [phone] (home)    Best Time: any    Can we leave a detailed message on this number? YES    Call taken on 8/31/2021 at 2:24 PM by Shiresa H. Ormond

## 2021-08-31 NOTE — TELEPHONE ENCOUNTER
Patient also needs to schedule Nurse Only Blood Pressure check along with Lab Only-needs Basic Metabolic Profile done.  Orders have been placed. Please call patient.    Ana Luisa Teresa RN

## 2021-09-09 ENCOUNTER — TELEPHONE (OUTPATIENT)
Dept: FAMILY MEDICINE | Facility: CLINIC | Age: 51
End: 2021-09-09

## 2021-09-09 ENCOUNTER — LAB (OUTPATIENT)
Dept: LAB | Facility: CLINIC | Age: 51
End: 2021-09-09
Payer: COMMERCIAL

## 2021-09-09 ENCOUNTER — ALLIED HEALTH/NURSE VISIT (OUTPATIENT)
Dept: FAMILY MEDICINE | Facility: CLINIC | Age: 51
End: 2021-09-09
Payer: COMMERCIAL

## 2021-09-09 VITALS — DIASTOLIC BLOOD PRESSURE: 86 MMHG | HEART RATE: 76 BPM | SYSTOLIC BLOOD PRESSURE: 126 MMHG

## 2021-09-09 DIAGNOSIS — I10 ESSENTIAL HYPERTENSION: Primary | ICD-10-CM

## 2021-09-09 DIAGNOSIS — I10 ESSENTIAL HYPERTENSION: ICD-10-CM

## 2021-09-09 PROCEDURE — 80048 BASIC METABOLIC PNL TOTAL CA: CPT

## 2021-09-09 PROCEDURE — 99207 PR NO CHARGE NURSE ONLY: CPT

## 2021-09-09 PROCEDURE — 36415 COLL VENOUS BLD VENIPUNCTURE: CPT

## 2021-09-09 NOTE — TELEPHONE ENCOUNTER
Patient came in today for a NURSE ONLY B/P check.   BP Readings from Last 3 Encounters:   09/09/21 126/86   07/29/21 (!) 131/92   06/22/21 110/80     Lisa Magill, CMA

## 2021-09-09 NOTE — PROGRESS NOTES
Corinna Barker is a 50 year old patient who comes in today for a Blood Pressure check.  Initial BP:  /86 (BP Location: Right arm, Patient Position: Sitting, Cuff Size: Adult Regular)   Pulse 76        Disposition: results routed to provider    Lisa Magill, CMA

## 2021-09-09 NOTE — NURSING NOTE
"Chief Complaint   Patient presents with     Allied Health Visit     B/P check.      Initial /86 (BP Location: Right arm, Patient Position: Sitting, Cuff Size: Adult Regular)   Pulse 76  Estimated body mass index is 30.79 kg/m  as calculated from the following:    Height as of 6/22/21: 1.619 m (5' 3.75\").    Weight as of 7/29/21: 80.7 kg (178 lb).  BP completed using cuff size regular right arm    Lisa Magill, CMA    "

## 2021-09-10 LAB
ANION GAP SERPL CALCULATED.3IONS-SCNC: 8 MMOL/L (ref 3–14)
BUN SERPL-MCNC: 12 MG/DL (ref 7–30)
CALCIUM SERPL-MCNC: 8.1 MG/DL (ref 8.5–10.1)
CHLORIDE BLD-SCNC: 106 MMOL/L (ref 94–109)
CO2 SERPL-SCNC: 22 MMOL/L (ref 20–32)
CREAT SERPL-MCNC: 0.54 MG/DL (ref 0.52–1.04)
GFR SERPL CREATININE-BSD FRML MDRD: >90 ML/MIN/1.73M2
GLUCOSE BLD-MCNC: 90 MG/DL (ref 70–99)
POTASSIUM BLD-SCNC: 3.8 MMOL/L (ref 3.4–5.3)
SODIUM SERPL-SCNC: 136 MMOL/L (ref 133–144)

## 2021-09-16 DIAGNOSIS — I10 ESSENTIAL HYPERTENSION: ICD-10-CM

## 2021-09-17 RX ORDER — LOSARTAN POTASSIUM 50 MG/1
TABLET ORAL
Qty: 90 TABLET | Refills: 0 | OUTPATIENT
Start: 2021-09-17

## 2021-09-17 NOTE — TELEPHONE ENCOUNTER
Patient switched to Irbesartan 150mg on 7/29/2021.    Stop losartan, start irbesartan.  BP check in 3-4 weeks.  May need dose adjustment.    Ana Luisa Teresa RN

## 2021-09-18 ENCOUNTER — HEALTH MAINTENANCE LETTER (OUTPATIENT)
Age: 51
End: 2021-09-18

## 2022-07-08 DIAGNOSIS — I10 ESSENTIAL HYPERTENSION: ICD-10-CM

## 2022-07-12 RX ORDER — AMLODIPINE BESYLATE 5 MG/1
TABLET ORAL
Qty: 90 TABLET | Refills: 0 | Status: SHIPPED | OUTPATIENT
Start: 2022-07-12 | End: 2022-10-10

## 2022-07-12 NOTE — TELEPHONE ENCOUNTER
Prescription approved per [facility] Refill Protocol.  Elsa Warren RN, BSN  [facility]

## 2022-07-20 DIAGNOSIS — I10 ESSENTIAL HYPERTENSION: ICD-10-CM

## 2022-07-22 RX ORDER — IRBESARTAN 150 MG/1
TABLET ORAL
Qty: 90 TABLET | Refills: 1 | Status: SHIPPED | OUTPATIENT
Start: 2022-07-22 | End: 2022-11-02

## 2022-10-07 DIAGNOSIS — I10 ESSENTIAL HYPERTENSION: ICD-10-CM

## 2022-10-10 RX ORDER — AMLODIPINE BESYLATE 5 MG/1
TABLET ORAL
Qty: 30 TABLET | Refills: 0 | Status: SHIPPED | OUTPATIENT
Start: 2022-10-10 | End: 2022-11-02

## 2022-10-10 NOTE — TELEPHONE ENCOUNTER
Did receive 1x 90 day amlodipine refill. Will refill for 30 days.    Please call patient to let her know she needs follow up appointment prior to any additional refills of amlodipine.    Thanks,    Derrick Mauricio MD  [facility]

## 2022-10-10 NOTE — TELEPHONE ENCOUNTER
Routing refill request to provider for review/approval because:  Kianna given x 1 and patient did not follow up, please advise  Labs not current:  creatinine  Patient needs to be seen because it has been more than 1 year since last office visit.  Failing bp    Lia Bryan RN

## 2022-11-02 ENCOUNTER — OFFICE VISIT (OUTPATIENT)
Dept: FAMILY MEDICINE | Facility: CLINIC | Age: 52
End: 2022-11-02
Payer: COMMERCIAL

## 2022-11-02 VITALS
HEART RATE: 74 BPM | SYSTOLIC BLOOD PRESSURE: 118 MMHG | TEMPERATURE: 98.2 F | RESPIRATION RATE: 15 BRPM | BODY MASS INDEX: 30.49 KG/M2 | WEIGHT: 183 LBS | DIASTOLIC BLOOD PRESSURE: 76 MMHG | HEIGHT: 65 IN | OXYGEN SATURATION: 99 %

## 2022-11-02 DIAGNOSIS — I10 ESSENTIAL HYPERTENSION: Primary | ICD-10-CM

## 2022-11-02 DIAGNOSIS — M77.11 LATERAL EPICONDYLITIS OF RIGHT ELBOW: ICD-10-CM

## 2022-11-02 LAB
ANION GAP SERPL CALCULATED.3IONS-SCNC: 7 MMOL/L (ref 3–14)
BUN SERPL-MCNC: 11 MG/DL (ref 7–30)
CALCIUM SERPL-MCNC: 8.6 MG/DL (ref 8.5–10.1)
CHLORIDE BLD-SCNC: 105 MMOL/L (ref 94–109)
CO2 SERPL-SCNC: 25 MMOL/L (ref 20–32)
CREAT SERPL-MCNC: 0.49 MG/DL (ref 0.52–1.04)
ERYTHROCYTE [DISTWIDTH] IN BLOOD BY AUTOMATED COUNT: 14 % (ref 10–15)
GFR SERPL CREATININE-BSD FRML MDRD: >90 ML/MIN/1.73M2
GLUCOSE BLD-MCNC: 98 MG/DL (ref 70–99)
HCT VFR BLD AUTO: 40.6 % (ref 35–47)
HGB BLD-MCNC: 13.4 G/DL (ref 11.7–15.7)
MCH RBC QN AUTO: 26.5 PG (ref 26.5–33)
MCHC RBC AUTO-ENTMCNC: 33 G/DL (ref 31.5–36.5)
MCV RBC AUTO: 80 FL (ref 78–100)
PLATELET # BLD AUTO: 331 10E3/UL (ref 150–450)
POTASSIUM BLD-SCNC: 4.1 MMOL/L (ref 3.4–5.3)
RBC # BLD AUTO: 5.05 10E6/UL (ref 3.8–5.2)
SODIUM SERPL-SCNC: 137 MMOL/L (ref 133–144)
WBC # BLD AUTO: 8.2 10E3/UL (ref 4–11)

## 2022-11-02 PROCEDURE — 80048 BASIC METABOLIC PNL TOTAL CA: CPT | Performed by: NURSE PRACTITIONER

## 2022-11-02 PROCEDURE — 85027 COMPLETE CBC AUTOMATED: CPT | Performed by: NURSE PRACTITIONER

## 2022-11-02 PROCEDURE — 99213 OFFICE O/P EST LOW 20 MIN: CPT | Performed by: NURSE PRACTITIONER

## 2022-11-02 PROCEDURE — 36415 COLL VENOUS BLD VENIPUNCTURE: CPT | Performed by: NURSE PRACTITIONER

## 2022-11-02 RX ORDER — IRBESARTAN 150 MG/1
150 TABLET ORAL AT BEDTIME
Qty: 90 TABLET | Refills: 1 | Status: SHIPPED | OUTPATIENT
Start: 2022-11-02 | End: 2023-07-05

## 2022-11-02 RX ORDER — AMLODIPINE BESYLATE 5 MG/1
5 TABLET ORAL DAILY
Qty: 90 TABLET | Refills: 3 | Status: SHIPPED | OUTPATIENT
Start: 2022-11-02 | End: 2023-09-28

## 2022-11-02 ASSESSMENT — PAIN SCALES - GENERAL: PAINLEVEL: MILD PAIN (3)

## 2022-11-02 NOTE — PROGRESS NOTES
"  Assessment & Plan     Essential hypertension  Chronic stable; will check routine lab work; refills made; advised to continue monitoring at home; encouraged diet and exercise; patient recently was able to quit smoking.    - irbesartan (AVAPRO) 150 MG tablet; Take 1 tablet (150 mg) by mouth At Bedtime  - amLODIPine (NORVASC) 5 MG tablet; Take 1 tablet (5 mg) by mouth daily  - CBC with platelets; Future  - Basic metabolic panel  (Ca, Cl, CO2, Creat, Gluc, K, Na, BUN); Future      Lateral epicondylitis of right elbow  Acute; patient denied imaging and PT today; advised to take otc naproxen BID x 10 days; RICE; provided education on tennis elbow; if symptoms do not improve and/or worsen advised to follow up for potential ortho referral and PT.           Nicotine/Tobacco Cessation:  She reports that she has been smoking cigarettes. She has been smoking an average of 1 pack per day. She has never used smokeless tobacco.  Nicotine/Tobacco Cessation Plan:   patient recently quit      BMI:   Estimated body mass index is 30.45 kg/m  as calculated from the following:    Height as of this encounter: 1.651 m (5' 5\").    Weight as of this encounter: 83 kg (183 lb).   Weight management plan: Discussed healthy diet and exercise guidelines      Return in about 6 months (around 5/2/2023) for Routine preventive.    RICHARD Figueroa CNP [facility] SHANIA ESPINOZA is a 52 year old, presenting for the following health issues:  Hypertension and Elbow Pain (For the past few months has noted pain and discomfort when elbow is straightened and some numbness in the last 2 fingers of her right hand when she is working )      History of Present Illness       Hypertension: She presents for follow up of hypertension.  She does check blood pressure  regularly outside of the clinic. Outpatient blood pressures have not been over 140/90. She does not follow a low salt diet.         Concern - right elbow " "pain  Onset:  About 2 months  Description: pain with certain movement and also some numbness in the last 2 fingers of the right hand when working   Intensity: mild, moderate  Progression of Symptoms:  same  Accompanying Signs & Symptoms: numbness in the fingers, discomfort in the forearm when brushing her hair.  Previous history of similar problem: no  Precipitating factors:        Worsened by: lifting the arm to work on projects and brush her hair  Alleviating factors:        Improved by: nothing   Therapies tried and outcome:  none     Patient states she was tubing over labor day weekend and was paddling with her right arm next day caused significant pain; lifting; brushing her hair/teeth, and when keeping her elbow bent will cause numbness/tingling at times. States it has improved but continues to be very bothersome.     Denies any concerns for bp states things have been going well; monitors levels at home.         Review of Systems   Constitutional, HEENT, cardiovascular, pulmonary, gi and gu systems are negative, except as otherwise noted.      Objective    /76   Pulse 74   Temp 98.2  F (36.8  C) (Oral)   Resp 15   Ht 1.651 m (5' 5\")   Wt 83 kg (183 lb)   SpO2 99%   BMI 30.45 kg/m    Body mass index is 30.45 kg/m .  Physical Exam   GENERAL: healthy, alert and no distress  RESP: lungs clear to auscultation - no rales, rhonchi or wheezes  CV: regular rate and rhythm, normal S1 S2, no S3 or S4, no murmur, click or rub, no peripheral edema and peripheral pulses strong  MS: normal muscle tone, decreased range of motion of right arm and no edema                "

## 2022-11-19 ENCOUNTER — HEALTH MAINTENANCE LETTER (OUTPATIENT)
Age: 52
End: 2022-11-19

## 2023-07-05 DIAGNOSIS — I10 ESSENTIAL HYPERTENSION: ICD-10-CM

## 2023-07-05 RX ORDER — IRBESARTAN 150 MG/1
150 TABLET ORAL AT BEDTIME
Qty: 90 TABLET | Refills: 0 | Status: SHIPPED | OUTPATIENT
Start: 2023-07-05 | End: 2023-09-28

## 2023-07-05 NOTE — TELEPHONE ENCOUNTER
Sent [employer] message requesting a call back for an appt (physical and mammogram). Two more attempts will be made.     Indigo White

## 2023-07-05 NOTE — TELEPHONE ENCOUNTER
Pt was due in May for annual exam.    Please call to schedule appt.  Also, please schedule mammogram.    Елена Awad RN

## 2023-09-21 ASSESSMENT — ENCOUNTER SYMPTOMS
COUGH: 0
PALPITATIONS: 0
CHILLS: 0
NAUSEA: 0
DIZZINESS: 0
DYSURIA: 0
NERVOUS/ANXIOUS: 0
CONSTIPATION: 0
JOINT SWELLING: 0
HEADACHES: 0
HEARTBURN: 0
ARTHRALGIAS: 0
FREQUENCY: 0
EYE PAIN: 0
BREAST MASS: 0
PARESTHESIAS: 0
MYALGIAS: 1
ABDOMINAL PAIN: 0
FEVER: 0
WEAKNESS: 0
DIARRHEA: 0
SORE THROAT: 0
HEMATOCHEZIA: 0
SHORTNESS OF BREATH: 0
HEMATURIA: 0

## 2023-09-28 ENCOUNTER — OFFICE VISIT (OUTPATIENT)
Dept: FAMILY MEDICINE | Facility: CLINIC | Age: 53
End: 2023-09-28
Payer: COMMERCIAL

## 2023-09-28 ENCOUNTER — LAB (OUTPATIENT)
Dept: FAMILY MEDICINE | Facility: CLINIC | Age: 53
End: 2023-09-28

## 2023-09-28 VITALS
DIASTOLIC BLOOD PRESSURE: 88 MMHG | SYSTOLIC BLOOD PRESSURE: 132 MMHG | OXYGEN SATURATION: 98 % | RESPIRATION RATE: 14 BRPM | TEMPERATURE: 97.8 F | HEIGHT: 65 IN | WEIGHT: 182.7 LBS | BODY MASS INDEX: 30.44 KG/M2 | HEART RATE: 78 BPM

## 2023-09-28 DIAGNOSIS — Z12.31 VISIT FOR SCREENING MAMMOGRAM: ICD-10-CM

## 2023-09-28 DIAGNOSIS — Z12.11 SCREEN FOR COLON CANCER: Primary | ICD-10-CM

## 2023-09-28 DIAGNOSIS — Z12.11 SCREEN FOR COLON CANCER: ICD-10-CM

## 2023-09-28 DIAGNOSIS — Z00.00 ROUTINE GENERAL MEDICAL EXAMINATION AT A HEALTH CARE FACILITY: ICD-10-CM

## 2023-09-28 DIAGNOSIS — I10 ESSENTIAL HYPERTENSION: ICD-10-CM

## 2023-09-28 LAB
ALBUMIN SERPL BCG-MCNC: 4 G/DL (ref 3.5–5.2)
ALP SERPL-CCNC: 93 U/L (ref 35–104)
ALT SERPL W P-5'-P-CCNC: 16 U/L (ref 0–50)
ANION GAP SERPL CALCULATED.3IONS-SCNC: 10 MMOL/L (ref 7–15)
AST SERPL W P-5'-P-CCNC: 24 U/L (ref 0–45)
BILIRUB SERPL-MCNC: 0.5 MG/DL
BUN SERPL-MCNC: 13.8 MG/DL (ref 6–20)
CALCIUM SERPL-MCNC: 9.3 MG/DL (ref 8.6–10)
CHLORIDE SERPL-SCNC: 103 MMOL/L (ref 98–107)
CHOLEST SERPL-MCNC: 208 MG/DL
CREAT SERPL-MCNC: 0.55 MG/DL (ref 0.51–0.95)
EGFRCR SERPLBLD CKD-EPI 2021: >90 ML/MIN/1.73M2
GLUCOSE SERPL-MCNC: 98 MG/DL (ref 70–99)
HCO3 SERPL-SCNC: 25 MMOL/L (ref 22–29)
HDLC SERPL-MCNC: 48 MG/DL
LDLC SERPL CALC-MCNC: 115 MG/DL
NONHDLC SERPL-MCNC: 160 MG/DL
POTASSIUM SERPL-SCNC: 4.1 MMOL/L (ref 3.4–5.3)
PROT SERPL-MCNC: 7.5 G/DL (ref 6.4–8.3)
SODIUM SERPL-SCNC: 138 MMOL/L (ref 135–145)
TRIGL SERPL-MCNC: 226 MG/DL

## 2023-09-28 PROCEDURE — 99213 OFFICE O/P EST LOW 20 MIN: CPT | Mod: 25 | Performed by: NURSE PRACTITIONER

## 2023-09-28 PROCEDURE — 99396 PREV VISIT EST AGE 40-64: CPT | Performed by: NURSE PRACTITIONER

## 2023-09-28 PROCEDURE — 80061 LIPID PANEL: CPT | Performed by: NURSE PRACTITIONER

## 2023-09-28 PROCEDURE — 80053 COMPREHEN METABOLIC PANEL: CPT | Performed by: NURSE PRACTITIONER

## 2023-09-28 RX ORDER — AMLODIPINE BESYLATE 5 MG/1
5 TABLET ORAL DAILY
Qty: 90 TABLET | Refills: 3 | Status: SHIPPED | OUTPATIENT
Start: 2023-09-28 | End: 2024-07-29

## 2023-09-28 RX ORDER — IRBESARTAN 150 MG/1
150 TABLET ORAL AT BEDTIME
Qty: 90 TABLET | Refills: 3 | Status: SHIPPED | OUTPATIENT
Start: 2023-09-28 | End: 2024-07-29

## 2023-09-28 ASSESSMENT — ENCOUNTER SYMPTOMS
HEARTBURN: 0
DIARRHEA: 0
ARTHRALGIAS: 0
BREAST MASS: 0
HEMATURIA: 0
WEAKNESS: 0
HEADACHES: 0
NERVOUS/ANXIOUS: 0
MYALGIAS: 1
NAUSEA: 0
SORE THROAT: 0
DYSURIA: 0
CHILLS: 0
FEVER: 0
COUGH: 0
JOINT SWELLING: 0
PALPITATIONS: 0
SHORTNESS OF BREATH: 0
HEMATOCHEZIA: 0
ABDOMINAL PAIN: 0
DIZZINESS: 0
CONSTIPATION: 0
FREQUENCY: 0
EYE PAIN: 0
PARESTHESIAS: 0

## 2023-09-28 ASSESSMENT — PAIN SCALES - GENERAL: PAINLEVEL: NO PAIN (0)

## 2023-09-28 NOTE — PROGRESS NOTES
SUBJECTIVE:   CC: OLGA is an 53 year old who presents for preventive health visit.       9/28/2023     8:49 AM   Additional Questions   Roomed by MR   Accompanied by AMY         9/28/2023     8:49 AM   Patient Reported Additional Medications   Patient reports taking the following new medications NA       Healthy Habits:     Getting at least 3 servings of Calcium per day:  Yes    Bi-annual eye exam:  Yes    Dental care twice a year:  Yes    Sleep apnea or symptoms of sleep apnea:  None    Diet:  Regular (no restrictions)    Frequency of exercise:  None    Taking medications regularly:  Yes    Medication side effects:  Not applicable    Additional concerns today:  No      Today's PHQ-2 Score:       9/28/2023     8:42 AM   PHQ-2 ( 1999 [employer])   Q1: Little interest or pleasure in doing things 0   Q2: Feeling down, depressed or hopeless 0   PHQ-2 Score 0   Q1: Little interest or pleasure in doing things Not at all   Q2: Feeling down, depressed or hopeless Not at all   PHQ-2 Score 0         No concerns.  Taking medications without problem.  No cardiac or respiratory symptoms.  Somewhat active at work.  No other exercise.      Drastically decreased smoking.  Now smoking maybe 1 cigarette per day.      Have you ever done Advance Care Planning? (For example, a Health Directive, POLST, or a discussion with a medical provider or your loved ones about your wishes): No, advance care planning information given to patient to review.  Patient declined advance care planning discussion at this time.    Social History     Tobacco Use    Smoking status: Light Smoker     Packs/day: 0.00     Years: 10.00     Pack years: 0.00     Types: Cigarettes    Smokeless tobacco: Never    Tobacco comments:     1 pack a week   Substance Use Topics    Alcohol use: Yes             9/21/2023     8:58 AM   Alcohol Use   Prescreen: >3 drinks/day or >7 drinks/week? No     Reviewed orders with patient.  Reviewed health maintenance and updated orders  accordingly - Yes  Patient Active Problem List   Diagnosis    Essential hypertension     No past surgical history on file.    Social History     Tobacco Use    Smoking status: Light Smoker     Packs/day: 0.00     Years: 10.00     Pack years: 0.00     Types: Cigarettes    Smokeless tobacco: Never    Tobacco comments:     1 pack a week   Substance Use Topics    Alcohol use: Yes     Family History   Adopted: Yes           Breast Cancer Screenin/22/2021     8:53 AM 2021     3:35 PM   Breast CA Risk Assessment (FHS-7)   Do you have a family history of breast, colon, or ovarian cancer? No / Unknown No / Unknown         Mammogram Screening: Recommended annual mammography  Pertinent mammograms are reviewed under the imaging tab.    History of abnormal Pap smear: NO - age 30-65 PAP every 5 years with negative HPV co-testing recommended      Latest Ref Rng & Units 2021     7:28 AM   PAP / HPV   PAP  Negative for Intraepithelial Lesion or Malignancy (NILM)    HPV 16 DNA Negative Negative    HPV 18 DNA Negative Negative    Other HR HPV Negative Negative      Reviewed and updated as needed this visit by clinical staff   Tobacco  Allergies  Meds              Reviewed and updated as needed this visit by Provider                     Review of Systems   Constitutional:  Negative for chills and fever.   HENT:  Negative for congestion, ear pain, hearing loss and sore throat.    Eyes:  Negative for pain and visual disturbance.   Respiratory:  Negative for cough and shortness of breath.    Cardiovascular:  Negative for chest pain, palpitations and peripheral edema.   Gastrointestinal:  Negative for abdominal pain, constipation, diarrhea, heartburn, hematochezia and nausea.   Breasts:  Negative for tenderness, breast mass and discharge.   Genitourinary:  Positive for vaginal discharge. Negative for dysuria, frequency, genital sores, hematuria, pelvic pain, urgency and vaginal bleeding.   Musculoskeletal:  Positive  "for myalgias. Negative for arthralgias and joint swelling.   Skin:  Negative for rash.   Neurological:  Negative for dizziness, weakness, headaches and paresthesias.   Psychiatric/Behavioral:  Negative for mood changes. The patient is not nervous/anxious.           OBJECTIVE:   /88 (BP Location: Right arm, Patient Position: Sitting, Cuff Size: Adult Regular)   Pulse 78   Temp 97.8  F (36.6  C) (Oral)   Resp 14   Ht 1.651 m (5' 5\")   Wt 82.9 kg (182 lb 11.2 oz)   LMP 09/01/2023 (Approximate)   SpO2 98%   BMI 30.40 kg/m    Physical Exam  GENERAL: healthy, alert and no distress  EYES: Eyes grossly normal to inspection  HENT: normal cephalic/atraumatic, both ears: occluded with wax, nose and mouth without ulcers or lesions, oropharynx clear, and oral mucous membranes moist  NECK: no adenopathy, no asymmetry, masses, or scars and thyroid normal to palpation  RESP: lungs clear to auscultation - no rales, rhonchi or wheezes  CV: regular rate and rhythm, normal S1 S2, no S3 or S4, no murmur, click or rub, no peripheral edema and peripheral pulses strong  ABDOMEN: soft, nontender, no hepatosplenomegaly, no masses and bowel sounds normal  NEURO: Normal strength and tone, mentation intact and speech normal  PSYCH: mentation appears normal, affect normal/bright    Diagnostic Test Results:  Labs reviewed in Epic    ASSESSMENT/PLAN:   (Z12.11) Screen for colon cancer  (primary encounter diagnosis)  Comment: declines colonoscopy.  Plan: [facility]            (Z12.31) Visit for screening mammogram  Comment:   Plan: [facility] SCREENING DIGITAL BILAT - Future  (s+30)        Scheduled.    (Z00.00) Routine general medical examination at a health care facility  Comment:   Plan: declines pneumonia vaccine and covid booster today.    (I10) Essential hypertension  Comment: asymptomatic.  Well controlled.  Plan: Comprehensive metabolic panel (BMP + Alb, Alk         Phos, ALT, AST, Total. Bili, TP), Lipid panel         " "reflex to direct LDL Fasting, irbesartan         (AVAPRO) 150 MG tablet, amLODIPine (NORVASC) 5         MG tablet        Refilled.          COUNSELING:  Reviewed preventive health counseling, as reflected in patient instructions      BMI:   Estimated body mass index is 30.4 kg/m  as calculated from the following:    Height as of this encounter: 1.651 m (5' 5\").    Weight as of this encounter: 82.9 kg (182 lb 11.2 oz).         She reports that she has been smoking cigarettes. She has never used smokeless tobacco.  Nicotine/Tobacco Cessation Plan:             RICHARD Marks Ra, CNP  [facility]  "

## 2023-10-16 LAB — NONINV COLON CA DNA+OCC BLD SCRN STL QL: NEGATIVE

## 2023-11-07 ENCOUNTER — MYC MEDICAL ADVICE (OUTPATIENT)
Dept: FAMILY MEDICINE | Facility: CLINIC | Age: 53
End: 2023-11-07
Payer: COMMERCIAL

## 2023-11-10 ENCOUNTER — ANCILLARY PROCEDURE (OUTPATIENT)
Dept: MAMMOGRAPHY | Facility: CLINIC | Age: 53
End: 2023-11-10
Attending: NURSE PRACTITIONER
Payer: COMMERCIAL

## 2023-11-10 DIAGNOSIS — Z12.31 VISIT FOR SCREENING MAMMOGRAM: ICD-10-CM

## 2023-11-10 PROCEDURE — 77063 BREAST TOMOSYNTHESIS BI: CPT | Mod: TC | Performed by: RADIOLOGY

## 2023-11-10 PROCEDURE — 77067 SCR MAMMO BI INCL CAD: CPT | Mod: TC | Performed by: RADIOLOGY

## 2024-01-16 ENCOUNTER — NURSE TRIAGE (OUTPATIENT)
Dept: FAMILY MEDICINE | Facility: CLINIC | Age: 54
End: 2024-01-16
Payer: COMMERCIAL

## 2024-01-16 ENCOUNTER — MYC MEDICAL ADVICE (OUTPATIENT)
Dept: FAMILY MEDICINE | Facility: CLINIC | Age: 54
End: 2024-01-16
Payer: COMMERCIAL

## 2024-01-16 NOTE — TELEPHONE ENCOUNTER
"Nurse Triage SBAR    Is this a 2nd Level Triage? NO    Situation: dizziness/ vertigo    Background: pt has hx of vertigo but resolved after referral to PT.   Dizziness/ vertigo returned 2 days ago. Reports mild dizziness upon standing from a sitting position and room bouncing when lying down.     Assessment: denies vision change, CP, difficulty breathing, SOB, weakness or other symptoms. Reports right ear \"Feels weird.\" States the outer right ear is swollen. Reports pt is just getting over a cold.      Protocol Recommended Disposition:   See in Office Within 3 Days    Recommendation: office visit.    PCP does not have openings. Scheduled with provider at [facility] location per protocol.   Reviewed care advice under care tab with pt. Instructed pt to call back if new or worsening symptoms. Patient was given an opportunity to ask questions, verbalized understanding of plan, and is agreeable.       Does the patient meet one of the following criteria for ADS visit consideration? 16+ years old, with an [facility] PCP     TIP  Providers, please consider if this condition is appropriate for management at one of our Acute and Diagnostic Services sites.     If patient is a good candidate, please use dotphrase <dot>triageresponse and select Refer to ADS to document.Feels when lieind down. Ok when standing.   Feels clam  Mild y   Right ear swallen. Couple weeks ago.   Inormal  Reason for Disposition   MILD dizziness (e.g., walking normally) and has NOT been evaluated by physician for this (Exception: Dizziness caused by heat exposure, sudden standing, or poor fluid intake.)    Additional Information   Negative: SEVERE difficulty breathing (e.g., struggling for each breath, speaks in single words)   Negative: Shock suspected (e.g., cold/pale/clammy skin, too weak to stand, low BP, rapid pulse)   Negative: Difficult to awaken or acting confused (e.g., disoriented, slurred speech)   Negative: Fainted, and still feels dizzy afterwards   " Negative: Overdose (accidental or intentional) of medications   Negative: New neurologic deficit that is present now: * Weakness of the face, arm, or leg on one side of the body * Numbness of the face, arm, or leg on one side of the body * Loss of speech or garbled speech   Negative: Heart beating < 50 beats per minute OR > 140 beats per minute   Negative: Sounds like a life-threatening emergency to the triager   Negative: Chest pain   Negative: Rectal bleeding, bloody stool, or tarry-black stool   Negative: Vomiting is main symptom   Negative: Diarrhea is main symptom   Negative: Headache is main symptom   Negative: Heat exhaustion suspected (i.e., dehydration from heat exposure)   Negative: Patient states that they are having an anxiety or panic attack   Negative: Dizziness from low blood sugar (i.e., < 60 mg/dl or 3.5 mmol/l)   Negative: SEVERE dizziness (e.g., unable to stand, requires support to walk, feels like passing out now)   Negative: SEVERE headache or neck pain   Negative: Spinning or tilting sensation (vertigo) present now and one or more stroke risk factors (i.e., hypertension, diabetes mellitus, prior stroke/TIA, heart attack, age over 60) (Exception: Prior physician evaluation for this AND no different/worse than usual.)   Negative: Neurologic deficit that was brief (now gone), ANY of the following:* Weakness of the face, arm, or leg on one side of the body* Numbness of the face, arm, or leg on one side of the body* Loss of speech or garbled speech   Negative: Loss of vision or double vision  (Exception: Similar to previous migraines.)   Negative: Extra heartbeats, irregular heart beating, or heart is beating very fast (i.e., 'palpitations')   Negative: Difficulty breathing   Negative: Drinking very little and dehydration suspected (e.g., no urine > 12 hours, very dry mouth, very lightheaded)   Negative: Follows bleeding (e.g., stomach, rectum, vagina)  (Exception: Became dizzy from sight of small  "amount blood.)   Negative: Patient sounds very sick or weak to the triager   Negative: Lightheadedness (dizziness) present now, after 2 hours of rest and fluids   Negative: Spinning or tilting sensation (vertigo) present now   Negative: Fever > 103 F (39.4 C)   Negative: Fever > 100.0 F (37.8 C) and has diabetes mellitus or a weak immune system (e.g., HIV positive, cancer chemotherapy, organ transplant, splenectomy, chronic steroids)   Negative: MODERATE dizziness (e.g., interferes with normal activities)  (Exception: Dizziness caused by heat exposure, sudden standing, or poor fluid intake.)   Negative: Vomiting occurs with dizziness   Negative: Patient wants to be seen   Negative: Taking a medicine that could cause dizziness (e.g., blood pressure medications, diuretics)    Answer Assessment - Initial Assessment Questions  1. DESCRIPTION: \"Describe your dizziness.\"     Lightheaded when lying down. When standing from a sitting position.   2. LIGHTHEADED: \"Do you feel lightheaded?\" (e.g., somewhat faint, woozy, weak upon standing)      Lightheaded  3. VERTIGO: \"Do you feel like either you or the room is spinning or tilting?\" (i.e. vertigo)      Not when sitting or standing. But when lying down feels it.   4. SEVERITY: \"How bad is it?\"  \"Do you feel like you are going to faint?\" \"Can you stand and walk?\" Can still stand and wak    - MILD: Feels slightly dizzy, but walking normally.    - MODERATE: Feels unsteady when walking, but not falling; interferes with normal activities (e.g., school, work).    - SEVERE: Unable to walk without falling, or requires assistance to walk without falling; feels like passing out now.       no  5. ONSET:  \"When did the dizziness begin?\"      2 days ago  6. AGGRAVATING FACTORS: \"Does anything make it worse?\" (e.g., standing, change in head position)      Getting over a bad cold. Right ear feels weird.   7. HEART RATE: \"Can you tell me your heart rate?\" \"How many beats in 15 seconds?\"  " "(Note: not all patients can do this)        normal  8. CAUSE: \"What do you think is causing the dizziness?\"      Not sure   9. RECURRENT SYMPTOM: \"Have you had dizziness before?\" If Yes, ask: \"When was the last time?\" \"What happened that time?\"      Few yrs ago. Lie down and room bouncing   10. OTHER SYMPTOMS: \"Do you have any other symptoms?\" (e.g., fever, chest pain, vomiting, diarrhea, bleeding)        no  11. PREGNANCY: \"Is there any chance you are pregnant?\" \"When was your last menstrual period?\"        N/A    Protocols used: Dizziness-A-OH    Ashley Watt RN    "

## 2024-01-17 ENCOUNTER — OFFICE VISIT (OUTPATIENT)
Dept: FAMILY MEDICINE | Facility: CLINIC | Age: 54
End: 2024-01-17
Payer: COMMERCIAL

## 2024-01-17 VITALS
DIASTOLIC BLOOD PRESSURE: 40 MMHG | WEIGHT: 181 LBS | OXYGEN SATURATION: 97 % | HEIGHT: 65 IN | SYSTOLIC BLOOD PRESSURE: 139 MMHG | RESPIRATION RATE: 16 BRPM | HEART RATE: 78 BPM | TEMPERATURE: 97.9 F | BODY MASS INDEX: 30.16 KG/M2

## 2024-01-17 DIAGNOSIS — H66.002 NON-RECURRENT ACUTE SUPPURATIVE OTITIS MEDIA OF LEFT EAR WITHOUT SPONTANEOUS RUPTURE OF TYMPANIC MEMBRANE: Primary | ICD-10-CM

## 2024-01-17 DIAGNOSIS — B37.31 YEAST INFECTION OF THE VAGINA: ICD-10-CM

## 2024-01-17 DIAGNOSIS — H81.12 BENIGN PAROXYSMAL POSITIONAL VERTIGO OF LEFT EAR: ICD-10-CM

## 2024-01-17 PROCEDURE — 99213 OFFICE O/P EST LOW 20 MIN: CPT

## 2024-01-17 RX ORDER — MECLIZINE HYDROCHLORIDE 25 MG/1
25 TABLET ORAL 3 TIMES DAILY PRN
Qty: 90 TABLET | Refills: 0 | Status: SHIPPED | OUTPATIENT
Start: 2024-01-17 | End: 2024-06-03

## 2024-01-17 RX ORDER — FLUCONAZOLE 150 MG/1
150 TABLET ORAL
Qty: 3 TABLET | Refills: 0 | Status: SHIPPED | OUTPATIENT
Start: 2024-01-17 | End: 2024-01-24

## 2024-01-17 NOTE — PROGRESS NOTES
"  Assessment & Plan     (H66.002) Non-recurrent acute suppurative otitis media of left ear without spontaneous rupture of tympanic membrane  (primary encounter diagnosis)  (B37.31) Yeast infection of the vagina  (H81.12) Benign paroxysmal positional vertigo of left ear  Comment: suspect patient's vertigo more than likely caused by patients acute left sided AOM. Will treat AOM w/ Augmentin. Patient does get yeast infections w/ abx use. Will provide Diflucan for prophylactic coverage. Discussed w/ patient to follow up if not noting any improvement no later than early next week. OTC remedies/medications to help w/ any discomfort or further symptoms. Discussed medication risks and benefits of Augmentin and Diflucan with patient in detail with patient verbal understanding. Patient fully understands and is agreeable with plan of care, at this point patient will follow up as needed unless acute concerns arise in the meantime.  Plan: amoxicillin-clavulanate (AUGMENTIN) 875-125 MG         Tablet, fluconazole (DIFLUCAN) 150 MG tablet, meclizine (ANTIVERT) 25 MG tablet     27 minutes spent by me on the date of the encounter doing chart review, history and exam, documentation and further activities per the note    BMI  Estimated body mass index is 30.12 kg/m  as calculated from the following:    Height as of this encounter: 1.651 m (5' 5\").    Weight as of this encounter: 82.1 kg (181 lb).     Jeremiah ESPINOZA is a 53 year old, presenting for the following health issues:  Dizziness    History of Present Illness       Reason for visit:  Dizziness  Symptom onset:  3-7 days ago  Symptoms include:  Light headed dizziness when laying down  Symptom intensity:  Mild  Symptom progression:  Staying the same  Had these symptoms before:  Yes  Has tried/received treatment for these symptoms:  No    She eats 0-1 servings of fruits and vegetables daily.She consumes 1 sweetened beverage(s) daily.She exercises with enough effort to " "increase her heart rate 10 to 19 minutes per day.  She exercises with enough effort to increase her heart rate 3 or less days per week. She is missing 1 dose(s) of medications per week.  She is not taking prescribed medications regularly due to remembering to take.     Dizziness  Onset/Duration: 01/11/2024  Description:   Do you feel faint: No  Does it feel like the surroundings (bed, room) are moving: when laying down, Right ear as some swelling, with some muscle tweaking, and ringing in the right ear that lasts for one minute.   Unsteady/off balance: No  Have you passed out or fallen: No  Intensity: mild  Progression of Symptoms: improving, waxes and wanes  Accompanying Signs & Symptoms:  Heart palpitations or chest pain: No  Nausea, vomiting: No  Weakness or lack of coordination in arms or legs: No  Vision or speech changes: No  Numbness or tingling: No  Ringing in ears (Tinnitus): YES  Hearing Loss: No  History:   Head trauma/concussion history: No  Previous similar symptoms: YES  Recent bleeding history: No  Any new medications (BP?): No  Precipitating factors:   Worse with activity: a little with head movement   Worse with head movement: YES  Alleviating factors:   Does staying in a fixed position give relief: YES  Therapies tried and outcome: None        Objective    BP (!) 145/82 (BP Location: Right arm, Patient Position: Sitting, Cuff Size: Adult Large)   Pulse 78   Temp 97.9  F (36.6  C) (Oral)   Resp 16   Ht 1.651 m (5' 5\")   Wt 82.1 kg (181 lb)   LMP 12/17/2023 (Approximate)   SpO2 97%   BMI 30.12 kg/m    Body mass index is 30.12 kg/m .    Review of Systems  Constitutional, HEENT, cardiovascular, pulmonary, GI, , neuro systems are negative, except as otherwise noted.  Physical Exam  Vitals and nursing note reviewed.   Constitutional:       General: She is not in acute distress.     Appearance: Normal appearance. She is not ill-appearing.   HENT:      Right Ear: Tympanic membrane, ear canal and " external ear normal. No drainage, swelling or tenderness. No middle ear effusion. There is no impacted cerumen. Tympanic membrane is not perforated, erythematous or bulging.      Left Ear: Ear canal and external ear normal. Tenderness present. No drainage or swelling. A middle ear effusion is present. There is impacted cerumen. Tympanic membrane is perforated, erythematous and bulging.      Nose: Congestion present. No rhinorrhea.      Mouth/Throat:      Mouth: Mucous membranes are moist.   Eyes:      General:         Right eye: No discharge.         Left eye: No discharge.      Conjunctiva/sclera: Conjunctivae normal.      Pupils: Pupils are equal, round, and reactive to light.   Cardiovascular:      Rate and Rhythm: Normal rate and regular rhythm.      Heart sounds: No murmur heard.     No friction rub. No gallop.   Pulmonary:      Effort: No respiratory distress.      Breath sounds: Normal breath sounds. No stridor. No wheezing, rhonchi or rales.   Skin:     General: Skin is warm and dry.   Neurological:      Mental Status: She is alert.   Psychiatric:         Mood and Affect: Mood normal.         Behavior: Behavior normal.         Thought Content: Thought content normal.         Judgment: Judgment normal.          Signed Electronically by: RICHARD Godoy CNP

## 2024-01-17 NOTE — PATIENT INSTRUCTIONS
Augmentin for 10 days twice a day   -take probiotic with this   -if not improving please follow up no later than next Monday.     Diflucan to help avoid yeast infection    Meclizine for any vertigo

## 2024-03-05 NOTE — MR AVS SNAPSHOT
"              After Visit Summary   10/2/2017    Corinna Barker    MRN: [id number]           Patient Information     Date Of Birth          1970        Visit Information        Provider Department      10/2/2017 3:00 PM  [facility]        Today's Diagnoses     BP check    -  1       Follow-ups after your visit        Who to contact     If you have questions or need follow up information about today's clinic visit or your schedule please contact [facility] directly at [phone].  Normal or non-critical lab and imaging results will be communicated to you by [employer], letter or phone within 4 business days after the clinic has received the results. If you do not hear from us within 7 days, please contact the clinic through [employer] or phone. If you have a critical or abnormal lab result, we will notify you by phone as soon as possible.  Submit refill requests through [employer] or call your pharmacy and they will forward the refill request to us. Please allow 3 business days for your refill to be completed.          Additional Information About Your Visit        MyChart Information     [employer] lets you send messages to your doctor, view your test results, renew your prescriptions, schedule appointments and more. To sign up, go to [email or web address] . Click on \"Log in\" on the left side of the screen, which will take you to the Welcome page. Then click on \"Sign up Now\" on the right side of the page.     You will be asked to enter the access code listed below, as well as some personal information. Please follow the directions to create your username and password.     Your access code is: [email or web address]  Expires: 10/8/2017  8:40 AM     Your access code will  in 90 days. If you need help or a new code, please call your [facility] or [phone].        Care EveryWhere ID     This is your Care EveryWhere ID. This could be used by other organizations to access your " Anesthesia Pre Eval Note    Anesthesia ROS/Med Hx        Anesthetic Complication History:    Patient does not have a history of anesthetic complications      Pulmonary Review:  Patient does not have a pulmonary history    The patient is not a smoker.    Neuro/Psych Review:  Patient does not have a neuro/psych history         Cardiovascular Review:   Exercise tolerance: good (>4 METS)  Positive for hypertension - well controlled  NHYA Class: I    GI/HEPATIC/RENAL Review:     Positive for bowel prep    End/Other Review:    Positive for arthritis  Additional Results:      ALLERGIES:  No Known Allergies   Last Labs        Component                Value               Date/Time                  WBC                      5.1                 11/10/2023 1455            RBC                      4.33 (L)            11/10/2023 1455            HGB                      13.3                11/10/2023 1455            HCT                      39.3                11/10/2023 1455            MCV                      90.8                11/10/2023 1455            MCH                      30.7                11/10/2023 1455            MCHC                     33.8                11/10/2023 1455            RDW-CV                   12.6                11/10/2023 1455            Sodium                   142                 11/10/2023 1455            Potassium                3.9                 11/10/2023 1455            Chloride                 110                 11/10/2023 1455            Carbon Dioxide           27                  11/10/2023 1455            Glucose                  80                  11/10/2023 1455            BUN                      10                  11/10/2023 1455            Creatinine               1.01                11/10/2023 1455            Glomerular Filtrati*     86                  11/10/2023 1455            Calcium                  8.9                 11/10/2023 1455            PLT                       210                 11/10/2023 1455        Past Medical History:  5/13/2003: Allergic rhinitis, cause unspecified  11/25/2008: ENTHESOPATHY OF HIP  Past Surgical History:  No date: Forearm/wrist surgery unlisted; Left      Comment:  glass removal from Left arm  01/23/2004: Removal of sperm duct(s)      Comment:  Vasectomy   Prior to Admission medications :  Medication Multiple Vitamins-Minerals (MULTI-VITAMIN GUMMIES PO), Sig Take 1 capsule by mouth. Toni gummies, Start Date , End Date , Taking? Yes, Authorizing Provider Provider, Outside    Medication electrolyte/PEG 3350 (NULYTELY) 420 g solution, Sig Take as directed on Colonoscopy prep letter, Start Date 1/4/24, End Date , Taking? Yes, Authorizing Provider Dav Hernandez MD    Medication lisinopril (ZESTRIL) 20 MG tablet, Sig Take 1 tablet by mouth daily., Start Date 11/10/23, End Date , Taking? Yes, Authorizing Provider William Mckenna, DO    Medication amLODIPine (NORVASC) 10 MG tablet, Sig Take 1 tablet by mouth daily., Start Date 11/10/23, End Date , Taking? Yes, Authorizing Provider William Mckenna, DO    Medication cholecalciferol (VITAMIN D3) 1000 UNITS tablet, Sig Take 1,000 Units by mouth daily., Start Date , End Date , Taking? Yes, Authorizing Provider Provider, Outside    Medication fluticasone (FLONASE) 50 MCG/ACT nasal spray, Sig Keep on file  Patient taking differently: Spray 1 spray in each nostril daily. Keep on file, Start Date 3/16/17, End Date , Taking? , Authorizing Provider Abdi Sky MD         Patient Vitals for the past 24 hrs:   BP Temp Temp src Pulse Resp SpO2 Height Weight   03/06/24 0909 (!) 146/84 36.4 °C (97.5 °F) Temporal 72 16 99 % 6' (1.829 m) 81 kg (178 lb 9.2 oz)           Relevant Problems   No relevant active problems       Physical Exam     Airway   Mallampati: II  TM Distance: >3 FB  Neck ROM: Full  Neck: Able to place in sniff position  TMJ Mobility: Good    Cardiovascular  Cardiovascular exam  [place] medical records  [id number]        Your Vitals Were     Pulse                   80            Blood Pressure from Last 3 Encounters:   10/02/17 (!) 142/92   07/24/17 (!) 146/100   07/10/17 144/86    Weight from Last 3 Encounters:   07/24/17 167 lb 9.6 oz (76 kg)   05/25/17 166 lb 14.4 oz (75.7 kg)   03/16/17 160 lb (72.6 kg)              Today, you had the following     No orders found for display       Primary Care Provider Fax #    Physician No Ref-Primary [phone]       No address on file        Equal Access to Services     [facility] : Hadii bruce williamson Sochris, wabahmanda baron, qaybta kaalmada navdeep, hoda de la torre . So [facility] [place] [phone].    ATENCIÓN: Si habla español, tiene a darby disposición servicios gratuitos de asistencia lingüística. Llame al [phone].    We comply with applicable federal civil rights laws and [place] laws. We do not discriminate on the basis of race, color, national origin, age, disability, sex, sexual orientation, or gender identity.            Thank you!     Thank you for choosing [facility]  for your care. Our goal is always to provide you with excellent care. Hearing back from our patients is one way we can continue to improve our services. Please take a few minutes to complete the written survey that you may receive in the mail after your visit with us. Thank you!             Your Updated Medication List - Protect others around you: Learn how to safely use, store and throw away your medicines at www.disposemymeds.org.          This list is accurate as of: 10/2/17  3:24 PM.  Always use your most recent med list.                   Brand Name Dispense Instructions for use Diagnosis    lisinopril 20 MG tablet    PRINIVIL/ZESTRIL    30 tablet    TAKE 1 TABLET (20 MG) BY MOUTH DAILY    Essential hypertension          normal    Dental Exam  Dental exam normal    Pulmonary Exam  Pulmonary exam normal  Breath sounds clear to auscultation:  Yes    Abdominal Exam  Abdominal exam normal      Anesthesia Plan:    Phone call attempted:   Case discussed with Patient or Representative    ASA Status: 2  Anesthesia Type: MAC    Induction: Intravenous  Preferred Airway Type: Mask  Patient does not have a difficult airway or is not at risk of aspiration.   Maintenance: TIVA  Premedication: None    Patient does not have an implantable electronic device requiring post procedure programming.     Post-op Pain Management: Per Surgeon  Postoperative analgesia plan does NOT include opiods    Checklist  Reviewed: Lab Results, Nursing Notes, Medications, Past Med History, Allergies, Patient Summary, Problem list and NPO Status  Consent/Risks Discussed Statement:  The proposed anesthetic plan, including its risks and benefits, have been discussed with the Patient along with the risks and benefits of alternatives. Questions were encouraged and answered and the patient and/or representative understands and agrees to proceed.        I discussed with the patient (and/or patient's legal representative) the risks and benefits of the proposed anesthesia plan, MAC, which may include services performed by other anesthesia providers.    Alternative anesthesia plans, if available, were reviewed with the patient (and/or patient's legal representative). Discussion has been held with the patient (and/or patient's legal representative) regarding risks of anesthesia, which include Nausea, Vomiting, Headache, Hypotension, Allergic Reaction, Aspiration, Dental Injury and Sore Throat and emergent situations that may require change in anesthesia plan.    The patient (and/or patient's legal representative) has indicated understanding, his/her questions have been answered, and he/she wishes to proceed with the planned anesthetic.    Blood Products: Not  Anticipated    Comments  Plan Comments: I have reviewed the H&P, I interviewed and examined the patient and there are no changes in the patient's condition.   I have explained the risk of complications of monitored anesthesia care to the patient. The goals of MAC were to provides patient with anxiety relief, amnesia, pain relief, comfort and safety during the procedure ,as well as monitoring the cardiovascular and respiratory systems. Patient understands that MAC does not exclude awareness. Side effects from anesthetic medications may include prolonged sedation, agitation, confusion, nausea, vomiting, corneal abrasion, damaged teeth, and respiratory depression. General anesthesia might be required if the patient canno't tolerate the procedure or patient has severe respiratory depression or airway obstruction. Standard precaution, prophylactic treatment and close monitoring will be taken to decrease the risk of complications. All questions are answered. Patient agrees to proceed.

## 2024-06-03 ENCOUNTER — OFFICE VISIT (OUTPATIENT)
Dept: URGENT CARE | Facility: URGENT CARE | Age: 54
End: 2024-06-03
Payer: COMMERCIAL

## 2024-06-03 VITALS
WEIGHT: 188.7 LBS | BODY MASS INDEX: 31.4 KG/M2 | OXYGEN SATURATION: 98 % | SYSTOLIC BLOOD PRESSURE: 134 MMHG | DIASTOLIC BLOOD PRESSURE: 84 MMHG | TEMPERATURE: 98.4 F | HEART RATE: 71 BPM

## 2024-06-03 DIAGNOSIS — I10 ESSENTIAL HYPERTENSION: ICD-10-CM

## 2024-06-03 DIAGNOSIS — M54.2 NECK PAIN: ICD-10-CM

## 2024-06-03 DIAGNOSIS — M54.12 CERVICAL RADICULOPATHY: Primary | ICD-10-CM

## 2024-06-03 PROCEDURE — 99213 OFFICE O/P EST LOW 20 MIN: CPT | Mod: 25 | Performed by: NURSE PRACTITIONER

## 2024-06-03 PROCEDURE — 96372 THER/PROPH/DIAG INJ SC/IM: CPT | Performed by: NURSE PRACTITIONER

## 2024-06-03 RX ORDER — METHYLPREDNISOLONE 4 MG
TABLET, DOSE PACK ORAL
Qty: 21 TABLET | Refills: 0 | Status: SHIPPED | OUTPATIENT
Start: 2024-06-03 | End: 2024-06-25

## 2024-06-03 RX ORDER — IBUPROFEN 200 MG
200 TABLET ORAL EVERY 4 HOURS PRN
COMMUNITY

## 2024-06-03 RX ORDER — KETOROLAC TROMETHAMINE 30 MG/ML
30 INJECTION, SOLUTION INTRAMUSCULAR; INTRAVENOUS ONCE
Status: COMPLETED | OUTPATIENT
Start: 2024-06-03 | End: 2024-06-03

## 2024-06-03 RX ORDER — CYCLOBENZAPRINE HCL 10 MG
5-10 TABLET ORAL 3 TIMES DAILY PRN
Qty: 30 TABLET | Refills: 0 | Status: SHIPPED | OUTPATIENT
Start: 2024-06-03 | End: 2024-06-13

## 2024-06-03 RX ORDER — HYDROCODONE BITARTRATE AND ACETAMINOPHEN 5; 325 MG/1; MG/1
1 TABLET ORAL EVERY 6 HOURS PRN
Qty: 6 TABLET | Refills: 0 | Status: SHIPPED | OUTPATIENT
Start: 2024-06-03 | End: 2024-06-06

## 2024-06-03 RX ADMIN — KETOROLAC TROMETHAMINE 30 MG: 30 INJECTION, SOLUTION INTRAMUSCULAR; INTRAVENOUS at 11:08

## 2024-06-03 NOTE — PROGRESS NOTES
Chief Complaint   Patient presents with    pinched nerve     Start 6/1/24 sx neck right side pinched nerve, can't move head up and down, 3rd and 4th finger get numb, pain radiating down into right arm pit and elbow tx Ibuprofen, old tramadol          ICD-10-CM    1. Cervical radiculopathy  M54.12 methylPREDNISolone (MEDROL DOSEPAK) 4 MG tablet therapy pack     ketorolac (TORADOL) injection 30 mg     cyclobenzaprine (FLEXERIL) 10 MG tablet     HYDROcodone-acetaminophen (NORCO) 5-325 MG tablet      2. Essential hypertension  I10       3. Neck pain  M54.2 methylPREDNISolone (MEDROL DOSEPAK) 4 MG tablet therapy pack     ketorolac (TORADOL) injection 30 mg     cyclobenzaprine (FLEXERIL) 10 MG tablet     HYDROcodone-acetaminophen (NORCO) 5-325 MG tablet      Ice and/or heat, steroids, muscle relaxants and hydrocodone as needed.  Patient is made aware no further opioid prescriptions will be given.    Toradol injection given and there was no allergic reaction after 25 minutes.  Pain did begin to diminish.  Patient is advised not to take any nonsteroidal anti-inflammatory medications for at least 6 hours after this injection.    Reviewed patient's blood pressure medications.  She is taking these as prescribed but does take them in the evening.  She certainly is in pain today and this could be elevating the blood pressure further.  We discussed the possibility of steroids increasing her blood pressure along with ibuprofen but as a temporary means to control her pain we will proceed with these treatments.  She will monitor her blood pressure at home and discontinue if it becomes severely elevated.    Red flag warning signs and when to go to the emergency room discussed.  Reviewed potential adverse reactions to medications.    Subjective     Corinna Barker is an 53 year old female who presents to clinic today for pain in the right side of neck since awakening during the night 2 nights ago.  Very difficult for her to flex and  extend her neck, limited movement to the right side due to pain.  Pain radiates down the right arm at times and into the axilla.  She has been taking 400 mg of ibuprofen without relief.  She also took a couple of old tramadol tablets she had without relief.    She denies any fever, chills, trauma or headaches.      Objective    /84   Pulse 71   Temp 98.4  F (36.9  C)   Wt 85.6 kg (188 lb 11.2 oz)   SpO2 98%   BMI 31.40 kg/m    Nurses notes and VS have been reviewed.    Physical Exam       GENERAL APPEARANCE: alert and moderate distress     EYES: PERRL, EOMI, sclera non-icteric     HENT: oral exam benign, mucus membranes intact, without ulcers or lesions     NECK: No adenopathy, no tenderness over the actual cervical spine but there is a great deal of muscle tightness in the right side of neck into the upper trapezius muscle     MS: extremities normal- no gross deformities noted; normal muscle tone.     SKIN: no suspicious lesions or rashes     NEURO: Normal strength and tone, mentation intact and speech normal, normal deep tendon reflexes in upper extremities      RICHARD Osborne, CNP  [facility] Provider    The use of Dragon/Tripshare dictation services may have been used to construct the content in this note; any grammatical or spelling errors are non-intentional. Please contact the author of this note directly if you are in need of any clarification.

## 2024-06-03 NOTE — PATIENT INSTRUCTIONS
Apply ice and or heat to neck as this provides comfort.    Do not take any nonsteroidal anti-inflammatory medications such as Advil, ibuprofen, naproxen, Aleve for the next 6 hours.    After this time is up you may take ibuprofen 800 mg (4 tablets) every 6-8 hours as needed for pain.  Be sure to take this with food as this can become hard on your stomach.  Please also be sure to take the methylprednisone with food.    Use the narcotic hydrocodone/acetaminophen sparingly as this will not be refilled.    Monitor blood pressure while on these medications to be sure it does not reach over 180 for the top number or 110 for the bottom number.  If this occurs please discontinue ibuprofen and methylprednisone.

## 2024-06-03 NOTE — LETTER
Suri 3, 2024      Corinna Barker  [address] [address] [facility] [address]        To Whom It May Concern:    Corinna Barker  was seen on 06/03/2024.  Please excuse her  until 06/05/2024 due to illness.        Sincerely,        YNES ZARCO, CNP

## 2024-06-12 ENCOUNTER — ANCILLARY PROCEDURE (OUTPATIENT)
Dept: GENERAL RADIOLOGY | Facility: CLINIC | Age: 54
End: 2024-06-12
Attending: FAMILY MEDICINE
Payer: COMMERCIAL

## 2024-06-12 ENCOUNTER — OFFICE VISIT (OUTPATIENT)
Dept: URGENT CARE | Facility: URGENT CARE | Age: 54
End: 2024-06-12
Payer: COMMERCIAL

## 2024-06-12 VITALS
BODY MASS INDEX: 30.79 KG/M2 | OXYGEN SATURATION: 99 % | WEIGHT: 185 LBS | SYSTOLIC BLOOD PRESSURE: 148 MMHG | HEART RATE: 98 BPM | RESPIRATION RATE: 16 BRPM | DIASTOLIC BLOOD PRESSURE: 90 MMHG | TEMPERATURE: 97.7 F

## 2024-06-12 DIAGNOSIS — M62.838 NECK MUSCLE SPASM: ICD-10-CM

## 2024-06-12 DIAGNOSIS — M54.12 CERVICAL RADICULOPATHY: Primary | ICD-10-CM

## 2024-06-12 DIAGNOSIS — M54.12 CERVICAL RADICULOPATHY: ICD-10-CM

## 2024-06-12 PROCEDURE — 72040 X-RAY EXAM NECK SPINE 2-3 VW: CPT | Mod: TC | Performed by: RADIOLOGY

## 2024-06-12 PROCEDURE — 99214 OFFICE O/P EST MOD 30 MIN: CPT | Performed by: FAMILY MEDICINE

## 2024-06-12 RX ORDER — METHOCARBAMOL 750 MG/1
750 TABLET, FILM COATED ORAL 4 TIMES DAILY PRN
Qty: 30 TABLET | Refills: 0 | Status: SHIPPED | OUTPATIENT
Start: 2024-06-12 | End: 2024-06-25

## 2024-06-12 NOTE — PROGRESS NOTES
SUBJECTIVE:  Chief Complaint   Patient presents with    Shoulder Pain     Follow up visit from 6/3 for right shoulder pain-sx not getting better, fingertips are tingling numbness sensation     Corinna Barker is a 53 year old female who presents with a chief complaint of right shoulder pain.    Seen in  6/3 for cervical radiculopathy.  RX Medrol dospak, fexeril, norco, Toradol.    Patient states that steroid did not make much of a difference.  Toradaol only lasted for 20 minutes.  Is taking ibuprofen.    No trauma or injury.  Endorsed neck pain and also notice pain in armpit location, radiates down to fingers and has numbness and tingling.  Patient is right handed.      Past Medical History:   Diagnosis Date    Hypertension      Current Outpatient Medications   Medication Sig Dispense Refill    amLODIPine (NORVASC) 5 MG tablet Take 1 tablet (5 mg) by mouth daily 90 tablet 3    ibuprofen (ADVIL/MOTRIN) 200 MG tablet Take 200 mg by mouth every 4 hours as needed for pain      irbesartan (AVAPRO) 150 MG tablet Take 1 tablet (150 mg) by mouth At Bedtime 90 tablet 3    cyclobenzaprine (FLEXERIL) 10 MG tablet Take 0.5-1 tablets (5-10 mg) by mouth 3 times daily as needed for muscle spasms (Patient not taking: Reported on 6/12/2024) 30 tablet 0    methylPREDNISolone (MEDROL DOSEPAK) 4 MG tablet therapy pack Follow Package Directions (Patient not taking: Reported on 6/12/2024) 21 tablet 0     Social History     Tobacco Use    Smoking status: Light Smoker     Current packs/day: 0.00     Types: Cigarettes     Passive exposure: Current    Smokeless tobacco: Never    Tobacco comments:     1 pack a week   Substance Use Topics    Alcohol use: Yes       ROS:  Review of systems negative except as stated above.    EXAM:   BP (!) 148/90   Pulse 98   Temp 97.7  F (36.5  C)   Resp 16   Wt 83.9 kg (185 lb)   SpO2 99%   BMI 30.79 kg/m    GENERAL APPEARANCE: healthy, alert and no distress  EXTREMITIES: peripheral pulses  normal  PSYCH:alert, affect bright    X-RAY was done - cervical spine - loss of normal cervical curvature, mild DJD changes, no acute fracture    ASSESSMENT/PLAN:  (M54.12) Cervical radiculopathy  (primary encounter diagnosis)  Comment: right sided  Plan: XR Cervical Spine 2/3 Views, methocarbamol         (ROBAXIN) 750 MG tablet, Spine          Referral            (M62.771) Neck muscle spasm  Comment: right sided  Plan: methocarbamol (ROBAXIN) 750 MG tablet            Discussed cervical radiculopathy and due to worseing/persistent symptoms, will refer to Spine Specialist for further evaluation and treatment.  Discussed NSIADs, tylenol, muscle relaxant - RX robaxin given.  Patient states that steroid did not help with symptoms so will defer additional steroid treatment.  Will follow up on formal xray report and notify if any abnormalities    Follow up with spine specialist in 1 week    Danny Rivera MD  June 12, 2024 11:24 AM

## 2024-06-12 NOTE — PATIENT INSTRUCTIONS
Okay to take ibuprofen 200 mg - 4 tablets (800 mg) every 8 hours as needed.  Okay to take tylenol 500 mg - 2 tablets (1000 mg) every 6-8 hours as needed, do not exceed 3000 mg in 24 hours.  Take muscle relaxant - robaxin 750 mg up to 4 times a day as needed    Follow up with Spine specialist

## 2024-06-25 ENCOUNTER — OFFICE VISIT (OUTPATIENT)
Dept: NEUROSURGERY | Facility: CLINIC | Age: 54
End: 2024-06-25
Attending: FAMILY MEDICINE
Payer: COMMERCIAL

## 2024-06-25 VITALS
DIASTOLIC BLOOD PRESSURE: 88 MMHG | HEART RATE: 80 BPM | SYSTOLIC BLOOD PRESSURE: 148 MMHG | WEIGHT: 185 LBS | HEIGHT: 65 IN | BODY MASS INDEX: 30.82 KG/M2

## 2024-06-25 DIAGNOSIS — M54.12 CERVICAL RADICULOPATHY: Primary | ICD-10-CM

## 2024-06-25 DIAGNOSIS — M50.30 DDD (DEGENERATIVE DISC DISEASE), CERVICAL: ICD-10-CM

## 2024-06-25 DIAGNOSIS — R20.2 NUMBNESS AND TINGLING OF RIGHT UPPER EXTREMITY: ICD-10-CM

## 2024-06-25 DIAGNOSIS — R20.0 NUMBNESS AND TINGLING OF RIGHT UPPER EXTREMITY: ICD-10-CM

## 2024-06-25 PROCEDURE — 99203 OFFICE O/P NEW LOW 30 MIN: CPT | Performed by: NURSE PRACTITIONER

## 2024-06-25 RX ORDER — GABAPENTIN 100 MG/1
100-300 CAPSULE ORAL
Qty: 60 CAPSULE | Refills: 3 | Status: SHIPPED | OUTPATIENT
Start: 2024-06-25 | End: 2024-10-03

## 2024-06-25 ASSESSMENT — PAIN SCALES - GENERAL: PAINLEVEL: MILD PAIN (2)

## 2024-06-25 NOTE — PROGRESS NOTES
ASSESSMENT: Corinna Barker is a 53 year old female presents for consultation at the request of PCP Shannan Farias Ra, with past medical history significant for hypertension, who presents today for new patient evaluation of :    -Right cervical radiculopathy x 4 weeks, minimal benefit with Medrol Dosepak    Patient is neurologically intact on exam. No myelopathic or red flag symptoms.          6/22/2024    10:25 AM   OSWESTRY DISABILITY INDEX   Count 10   Sum 9   Oswestry Score (%) 18 %            Diagnoses and all orders for this visit:  Cervical radiculopathy  -     Spine  Referral  -     gabapentin (NEURONTIN) 100 MG capsule; Take 1-3 capsules (100-300 mg) by mouth nightly as needed for other (Cervical spine pain)  -     Physical Therapy  Referral; Future  Numbness and tingling of right upper extremity  -     Physical Therapy  Referral; Future  DDD (degenerative disc disease), cervical  -     Physical Therapy  Referral; Future     PLAN:  Reviewed spine anatomy and disease process. Discussed diagnosis and treatment options with the patient today. A shared decision making model was used. The patient's values and choices were respected. The following represents what was discussed and decided upon by the provider and the patient.     -DIAGNOSTIC TESTS:  Images were personally reviewed and interpreted and explained to patient today using spine model.   --Discussed with patient that if symptoms worsen at any time we could obtain a cervical spine MRI, currently her symptoms are more tolerable and she would like to trial PT first which is reasonable as she is neurologically intact on exam.  --Cervical spine x-ray 6/12/2024 with disc height loss at C6-7.  Reversal of cervical lordosis centered at C5.    -PHYSICAL THERAPY: Referral to physical therapy for cervical core strengthening and nerve glide exercises.  Discussed the importance of core strengthening, ROM, stretching exercises  with the patient and how each of these entities is important in decreasing pain.  Explained to the patient that the purpose of physical therapy is to teach the patient a home exercise program.  These exercises need to be performed every day in order to decrease pain and prevent future occurrences of pain.  Likened it to brushing one's teeth.      -MEDICATIONS: Prescribed gabapentin 100 mg 1 to 3 tablets at bedtime as needed for radiculopathy symptoms.  We did discuss prescription NSAID as well, she would like to continue with ibuprofen as needed which is reasonable.  Discussed multiple medication options today with patient. Discussed risks, side effects, and proper use of medications. Patient verbalized understanding.    -INTERVENTIONS: No recommendations for injections today.    -PATIENT EDUCATION: Total time of 38 minutes, on the day of service, spent with the patient, reviewing the chart, placing orders, and documenting.     -FOLLOW-UP:   Follow-up if symptoms are not improving with physical therapy or worsen/change at any time.    Advised patient to call the Spine Center if symptoms worsen or you have problems controlling bladder and bowel function.   ______________________________________________________________________    SUBJECTIVE:   Corinna Barker  is a 53 year old female who presents today for new patient evaluation of neck pain right neck that radiates to the right upper extremity with associated numbness and tingling nonspecifically into the right hand ongoing for the last 4 weeks with no known injury.  She does report that it feels like she constantly hit her funny bone in her right arm and she does get sometimes numbness and sometimes pins-and-needles feeling when she is laying down.  She does report worsening symptoms with moving her head up and down or transition from sit to stand as well.      Denies upper extremity weakness, denies left arm symptoms.  She is right-hand dominant.  Denies recent  trips or falls or balance changes.  Denies bowel or bladder loss control.    -Treatment to Date: No prior spinal surgery or spinal injections.  Chiropractic treatments with Dr. Faustin x 3 in [place], no lasting benefit  No prior physical therapy    -Medications:  Ibuprofen  Methocarbamol prescribed 6/12/2024   Cyclobenzaprine  Hydrocodone prescribed 6/3/2024    Toradol injection given 6/3/2024 with short-term relief only    Medrol Dosepak 6/3/2024 with minimal lasting benefit    Current Outpatient Medications   Medication Sig Dispense Refill    gabapentin (NEURONTIN) 100 MG capsule Take 1-3 capsules (100-300 mg) by mouth nightly as needed for other (Cervical spine pain) 60 capsule 3    ibuprofen (ADVIL/MOTRIN) 200 MG tablet Take 200 mg by mouth every 4 hours as needed for pain      amLODIPine (NORVASC) 5 MG tablet Take 1 tablet (5 mg) by mouth daily 90 tablet 3    irbesartan (AVAPRO) 150 MG tablet Take 1 tablet (150 mg) by mouth At Bedtime 90 tablet 3     No current facility-administered medications for this visit.       Allergies   Allergen Reactions    Lisinopril Cough    Aspirin Rash       Past Medical History:   Diagnosis Date    Hypertension         Patient Active Problem List   Diagnosis    Essential hypertension       No past surgical history on file.    Family History   Adopted: Yes       Reviewed past medical, surgical, and family history with patient found on new patient intake packet located in EMR Media tab.     SOCIAL HX: Patient is , works in [employer].  Patient does smoke occasionally, does use alcohol occasionally, denies history being a heavy drinker.  Denies recreational drug use.    ROS: Positive for muscle pain, joint pain, muscle fatigue, itching, changes in vision, ringing in ears, weight gain.  Specifically negative for bowel/bladder dysfunction, balance changes, headache, dizziness, foot drop, fevers, chills, appetite changes, nausea/vomiting, unexplained weight loss. Otherwise 13  "systems reviewed are negative. Please see the patient's intake questionnaire from today for details.    OBJECTIVE:  BP (!) 148/88 (BP Location: Left arm, Patient Position: Sitting, Cuff Size: Adult Regular)   Pulse 80   Ht 5' 5\" (1.651 m)   Wt 185 lb (83.9 kg)   BMI 30.79 kg/m      PHYSICAL EXAMINATION:  --CONSTITUTIONAL: Vital signs as above. No acute distress. The patient is well nourished and well groomed.  --PSYCHIATRIC: The patient is awake, alert, oriented to person, place, time and answering questions appropriately with clear speech. Appropriate mood and affect   --HEENT: Sclera are non-injected. Extraocular muscles are intact. Thyroid moves easily upon swallowing.  Moist oral mucosa.  --SKIN: Skin over the face, bilateral upper extremities, and posterior torso is clean, dry, intact without rashes.  --RESPIRATORY: Normal rhythm and effort. No abnormal accessory muscle breathing patterns noted.   --GROSS MOTOR: Easily arises from a seated position. Toe walking and heel walking are normal.    --CERVICAL SPINE: Inspection reveals no evidence of deformity. Range of motion is slightly limited with forward flexion and extension due to pain. Spurling maneuver positive on the right.  --SHOULDERS: Full range of motion bilaterally.  --UPPER EXTREMITY MOTOR TESTING:  Wrist flexion left 5/5, right 5/5  Wrist extension left 5/5, right 5/5  Pronators left 5/5, right 5/5  Biceps left 5/5, right 5/5   Triceps left 5/5, right 5/5   Shoulder abduction left 5/5, right 5/5   left 5/5, right 5/5  --NEUROLOGIC: CN III-XII are grossly intact. 2/4 symmetric biceps, brachioradialis, triceps reflexes bilaterally. Sensation to upper extremities is intact.  Negative Haywood's bilaterally.    --VASCULAR: 2/4 radial pulses bilaterally. Warm upper limbs bilaterally. Capillary refill in the upper extremities is less than 1 second.    RESULTS: Prior medical records from [facility]where were reviewed today. "     Imaging:  Spine imaging was personally reviewed and interpreted today. The images were shown to the patient and the findings were explained using a spine model.      XR Cervical Spine 2/3 Views  Result Date: 6/12/2024  CERVICAL SPINE 2/3 VIEWS 6/12/2024 10:41 AM COMPARISON: None HISTORY: Right-sided neck pain, radiculopathy; Cervical radiculopathy.   IMPRESSION: There is normal alignment of the cervical vertebrae; however, there is reversal of normal cervical lordosis centered at the C5 level. Vertebral body heights of the cervical spine are normal. Craniocervical alignment is normal. There are no fractures of the cervical spine. Loss of disc space height and degenerative endplate spurring at C6-C7. No other significant degenerative change. DARIAN PAULINO MD   SYSTEM ID:  [id number]

## 2024-06-25 NOTE — PATIENT INSTRUCTIONS
~Spine Center Scheduling #[phone].  ~Please call our [facility] Nurse Navigation #[phone] with any questions or concerns about your treatment plan, if symptoms worsen and you would like to be seen urgently, or if you have problems controlling bladder and bowel function.  ~For any future flareups or new symptoms, recommend follow-up in clinic or contact the nurse navigator line.  ~Please note that any My Chart messages may take multiple days for a response due to the high volume of patients seen in clinic.  Anything sent Thursday night or after will be answered the following week when able, as Sabine Woodard CNP does not work in clinic on Fridays.   ~Sabine Woodard CNP is at the [facility] on Tuesdays, otherwise primarily at the [place] [facility].       ~You have been referred for Physical Therapy to [facility]. They will call you to schedule an appointment.       Scheduling phone number is [phone] [facility] [place], [place], or [place] location.  If you have not heard from the scheduling office within 2 business days, please call [phone] for ALL other locations.     Discussed the importance of core strengthening, ROM, stretching exercises and how each of these entities is important in decreasing pain and improving long term spine health.  The purpose of physical therapy is to teach you an individualized home exercise program.  These exercises need to be performed every day in order to decrease pain and prevent future occurrences of pain.

## 2024-06-25 NOTE — LETTER
6/25/2024      Corinna Barker  [address] [facility] [address]      Dear Colleague,    Thank you for referring your patient, Corinna Barker, to the [facility] [place]. Please see a copy of my visit note below.    ASSESSMENT: Corinna Barker is a 53 year old female presents for consultation at the request of PCP Shannan Farias Ra, with past medical history significant for hypertension, who presents today for new patient evaluation of :    -Right cervical radiculopathy x 4 weeks, minimal benefit with Medrol Dosepak    Patient is neurologically intact on exam. No myelopathic or red flag symptoms.          6/22/2024    10:25 AM   OSWESTRY DISABILITY INDEX   Count 10   Sum 9   Oswestry Score (%) 18 %            Diagnoses and all orders for this visit:  Cervical radiculopathy  -     Spine  Referral  -     gabapentin (NEURONTIN) 100 MG capsule; Take 1-3 capsules (100-300 mg) by mouth nightly as needed for other (Cervical spine pain)  -     Physical Therapy  Referral; Future  Numbness and tingling of right upper extremity  -     Physical Therapy  Referral; Future  DDD (degenerative disc disease), cervical  -     Physical Therapy  Referral; Future     PLAN:  Reviewed spine anatomy and disease process. Discussed diagnosis and treatment options with the patient today. A shared decision making model was used. The patient's values and choices were respected. The following represents what was discussed and decided upon by the provider and the patient.     -DIAGNOSTIC TESTS:  Images were personally reviewed and interpreted and explained to patient today using spine model.   --Discussed with patient that if symptoms worsen at any time we could obtain a cervical spine MRI, currently her symptoms are more tolerable and she would like to trial PT first which is reasonable as she is neurologically intact on exam.  --Cervical spine x-ray 6/12/2024 with  disc height loss at C6-7.  Reversal of cervical lordosis centered at C5.    -PHYSICAL THERAPY: Referral to physical therapy for cervical core strengthening and nerve glide exercises.  Discussed the importance of core strengthening, ROM, stretching exercises with the patient and how each of these entities is important in decreasing pain.  Explained to the patient that the purpose of physical therapy is to teach the patient a home exercise program.  These exercises need to be performed every day in order to decrease pain and prevent future occurrences of pain.  Likened it to brushing one's teeth.      -MEDICATIONS: Prescribed gabapentin 100 mg 1 to 3 tablets at bedtime as needed for radiculopathy symptoms.  We did discuss prescription NSAID as well, she would like to continue with ibuprofen as needed which is reasonable.  Discussed multiple medication options today with patient. Discussed risks, side effects, and proper use of medications. Patient verbalized understanding.    -INTERVENTIONS: No recommendations for injections today.    -PATIENT EDUCATION: Total time of 38 minutes, on the day of service, spent with the patient, reviewing the chart, placing orders, and documenting.     -FOLLOW-UP:   Follow-up if symptoms are not improving with physical therapy or worsen/change at any time.    Advised patient to call the Spine Center if symptoms worsen or you have problems controlling bladder and bowel function.   ______________________________________________________________________    SUBJECTIVE:   Corinna Barker  is a 53 year old female who presents today for new patient evaluation of neck pain right neck that radiates to the right upper extremity with associated numbness and tingling nonspecifically into the right hand ongoing for the last 4 weeks with no known injury.  She does report that it feels like she constantly hit her funny bone in her right arm and she does get sometimes numbness and sometimes  pins-and-needles feeling when she is laying down.  She does report worsening symptoms with moving her head up and down or transition from sit to stand as well.      Denies upper extremity weakness, denies left arm symptoms.  She is right-hand dominant.  Denies recent trips or falls or balance changes.  Denies bowel or bladder loss control.    -Treatment to Date: No prior spinal surgery or spinal injections.  Chiropractic treatments with Dr. Faustin x 3 in [place], no lasting benefit  No prior physical therapy    -Medications:  Ibuprofen  Methocarbamol prescribed 6/12/2024   Cyclobenzaprine  Hydrocodone prescribed 6/3/2024    Toradol injection given 6/3/2024 with short-term relief only    Medrol Dosepak 6/3/2024 with minimal lasting benefit    Current Outpatient Medications   Medication Sig Dispense Refill     gabapentin (NEURONTIN) 100 MG capsule Take 1-3 capsules (100-300 mg) by mouth nightly as needed for other (Cervical spine pain) 60 capsule 3     ibuprofen (ADVIL/MOTRIN) 200 MG tablet Take 200 mg by mouth every 4 hours as needed for pain       amLODIPine (NORVASC) 5 MG tablet Take 1 tablet (5 mg) by mouth daily 90 tablet 3     irbesartan (AVAPRO) 150 MG tablet Take 1 tablet (150 mg) by mouth At Bedtime 90 tablet 3     No current facility-administered medications for this visit.       Allergies   Allergen Reactions     Lisinopril Cough     Aspirin Rash       Past Medical History:   Diagnosis Date     Hypertension         Patient Active Problem List   Diagnosis     Essential hypertension       No past surgical history on file.    Family History   Adopted: Yes       Reviewed past medical, surgical, and family history with patient found on new patient intake packet located in EMR Media tab.     SOCIAL HX: Patient is , works in [employer].  Patient does smoke occasionally, does use alcohol occasionally, denies history being a heavy drinker.  Denies recreational drug use.    ROS: Positive for muscle pain, joint  "pain, muscle fatigue, itching, changes in vision, ringing in ears, weight gain.  Specifically negative for bowel/bladder dysfunction, balance changes, headache, dizziness, foot drop, fevers, chills, appetite changes, nausea/vomiting, unexplained weight loss. Otherwise 13 systems reviewed are negative. Please see the patient's intake questionnaire from today for details.    OBJECTIVE:  BP (!) 148/88 (BP Location: Left arm, Patient Position: Sitting, Cuff Size: Adult Regular)   Pulse 80   Ht 5' 5\" (1.651 m)   Wt 185 lb (83.9 kg)   BMI 30.79 kg/m      PHYSICAL EXAMINATION:  --CONSTITUTIONAL: Vital signs as above. No acute distress. The patient is well nourished and well groomed.  --PSYCHIATRIC: The patient is awake, alert, oriented to person, place, time and answering questions appropriately with clear speech. Appropriate mood and affect   --HEENT: Sclera are non-injected. Extraocular muscles are intact. Thyroid moves easily upon swallowing.  Moist oral mucosa.  --SKIN: Skin over the face, bilateral upper extremities, and posterior torso is clean, dry, intact without rashes.  --RESPIRATORY: Normal rhythm and effort. No abnormal accessory muscle breathing patterns noted.   --GROSS MOTOR: Easily arises from a seated position. Toe walking and heel walking are normal.    --CERVICAL SPINE: Inspection reveals no evidence of deformity. Range of motion is slightly limited with forward flexion and extension due to pain. Spurling maneuver positive on the right.  --SHOULDERS: Full range of motion bilaterally.  --UPPER EXTREMITY MOTOR TESTING:  Wrist flexion left 5/5, right 5/5  Wrist extension left 5/5, right 5/5  Pronators left 5/5, right 5/5  Biceps left 5/5, right 5/5   Triceps left 5/5, right 5/5   Shoulder abduction left 5/5, right 5/5   left 5/5, right 5/5  --NEUROLOGIC: CN III-XII are grossly intact. 2/4 symmetric biceps, brachioradialis, triceps reflexes bilaterally. Sensation to upper extremities is intact.  " Negative Haywood's bilaterally.    --VASCULAR: 2/4 radial pulses bilaterally. Warm upper limbs bilaterally. Capillary refill in the upper extremities is less than 1 second.    RESULTS: Prior medical records from [facility] Everywhere were reviewed today.     Imaging:  Spine imaging was personally reviewed and interpreted today. The images were shown to the patient and the findings were explained using a spine model.      XR Cervical Spine 2/3 Views  Result Date: 6/12/2024  CERVICAL SPINE 2/3 VIEWS 6/12/2024 10:41 AM COMPARISON: None HISTORY: Right-sided neck pain, radiculopathy; Cervical radiculopathy.   IMPRESSION: There is normal alignment of the cervical vertebrae; however, there is reversal of normal cervical lordosis centered at the C5 level. Vertebral body heights of the cervical spine are normal. Craniocervical alignment is normal. There are no fractures of the cervical spine. Loss of disc space height and degenerative endplate spurring at C6-C7. No other significant degenerative change. DARIAN PAULINO MD   SYSTEM ID:  [id number]         Again, thank you for allowing me to participate in the care of your patient.        Sincerely,        Sabine Woodard, CNP

## 2024-07-16 ENCOUNTER — THERAPY VISIT (OUTPATIENT)
Dept: PHYSICAL THERAPY | Facility: CLINIC | Age: 54
End: 2024-07-16
Attending: NURSE PRACTITIONER
Payer: COMMERCIAL

## 2024-07-16 DIAGNOSIS — M50.30 DDD (DEGENERATIVE DISC DISEASE), CERVICAL: ICD-10-CM

## 2024-07-16 DIAGNOSIS — R20.0 NUMBNESS AND TINGLING OF RIGHT UPPER EXTREMITY: ICD-10-CM

## 2024-07-16 DIAGNOSIS — M54.12 CERVICAL RADICULOPATHY: ICD-10-CM

## 2024-07-16 DIAGNOSIS — R20.2 NUMBNESS AND TINGLING OF RIGHT UPPER EXTREMITY: ICD-10-CM

## 2024-07-16 PROCEDURE — 97161 PT EVAL LOW COMPLEX 20 MIN: CPT | Mod: GP | Performed by: PHYSICAL THERAPIST

## 2024-07-16 PROCEDURE — 97110 THERAPEUTIC EXERCISES: CPT | Mod: GP | Performed by: PHYSICAL THERAPIST

## 2024-07-16 PROCEDURE — 97530 THERAPEUTIC ACTIVITIES: CPT | Mod: GP | Performed by: PHYSICAL THERAPIST

## 2024-07-16 NOTE — PROGRESS NOTES
PHYSICAL THERAPY EVALUATION  Type of Visit: Evaluation              Subjective       Presenting condition or subjective complaint: Slight neck pain right arm occasionally goes numb  Date of onset:      Relevant medical history:     Dates & types of surgery:      Prior diagnostic imaging/testing results:       Prior therapy history for the same diagnosis, illness or injury: No      Prior Level of Function  Transfers: Independent  Ambulation: Independent  ADL: Independent      Living Environment  Social support: With a significant other or spouse   Type of home: House   Stairs to enter the home: Yes 2 Is there a railing: No     Ramp: No   Stairs inside the home: Yes 15 Is there a railing: Yes     Help at home: None  Equipment owned:       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    Radiation:into R UE, into R hand  Type of pain is described as aching,.  Associated symptoms include: yes R hand (parasthesia)  Present since: June 1, 2023 with symptoms improving (improving/unchanging/worsening).  This condition is new (new/recurrent/chronic).  Symptoms commenced as a result of: sleeping?   Condition occurred in the following environment: home   Symptoms at onset: neck  Constant symptoms:   Intermittent symptoms: all symptoms    Dizziness/tinnitus/nausea/swallowing:neg    (with consideration for bending, sitting/rising, turning, lying/rising, am, as the day progresses, pm, when still, on the move, other )  Symptoms are made worse with the following: bending down, looking up, was worst at night (couldn't sleep on side), prolonged sitting   Symptoms are made better with the following: ice, heat, on the move    Sleep disturbance: initially  Sleeping postures: sides  Sleeping surface: firm  Number of pillows:2       Objective   Posture:   Sitting:  fair; Standing: ; Protruded head: mild; Relevant wry neck: nil  Posture Correction: none    Neurological:  Motor Deficit:  Myotomes L R   C4 (shoulder elevation)     C5 (shoulder abduction)     C6 (elbow flexion)  5-/5   C7 (elbow extension)     C8 (thumb extension)     T1 (finger add/abd)      Strength (lb) 55# 40#     Sensory Deficit, Reflexes, Dural Signs: +ULNTT    AROM: (Major, Moderate, Minimal or Nil loss)  Movement Loss Seamus Mod Min Nil Pain   Protrusion   x x stiff   Flexion   x x P neck   Retraction   x x    Extension   x  pdm   Lateral flexion R   25     Lateral flexion L   20     Rotation R   60  stiff   Rotation L   50  stiff     Repeated movement testing:   (During: produces, abolishes, increases, decreases, no effect, centralizing, peripheralizing; After: better, worse, no better, no worse, no effect, centralized, peripheralized)    Pre-test Symptoms Sitting: painfree   Symptoms During Symptoms After ROM increased ROM decreased No Effect   PRO        Rep PRO        RET P NW      Rep RET P NW Improved  strength to 45#, decr L Rot     RET EXT        Rep RET EXT        LF - R        Rep LF - R        LF - L        Rep LF - L        ROT - R        Rep ROT - R        ROT - L        Rep ROT - L        FLEX        Rep FLEX          Pre-test Symptoms Lying (if needed):    Symptoms During Symptoms After ROM increased ROM decreased No Effect   RET P NW      Rep RET P NW X, improved  strength to 50#     RET EXT        Rep RET EXT          Static Tests:   Other Tests:     Provisional Classification: derangement; asymmetrical above elbow  Principle of Management (education/equipment/mechanical therapy/specific principle): rep RET supine (able to tolerate sitting at end of session) or seated RET x 10/2 hrs; posture correction w/lumbar roll.       Assessment & Plan   CLINICAL IMPRESSIONS  Medical Diagnosis:      Treatment Diagnosis:     Impression/Assessment: Patient is a 53 year old female with cervical  complaints.  The following significant findings have been identified: Pain, Decreased ROM/flexibility, Decreased strength, Decreased activity tolerance, and Impaired posture. These impairments interfere with their ability to perform self care tasks, work tasks, and household chores as compared to previous level of function.     Clinical Decision Making (Complexity):  Clinical Presentation: Stable/Uncomplicated  Clinical Presentation Rationale: based on medical and personal factors listed in PT evaluation  Clinical Decision Making (Complexity): Low complexity    PLAN OF CARE  Treatment Interventions:  Interventions: Manual Therapy, Neuromuscular Re-education, Therapeutic Activity, Therapeutic Exercise    Long Term Goals            Frequency of Treatment:    Duration of Treatment:      Recommended Referrals to Other Professionals:   Education Assessment:        Risks and benefits of evaluation/treatment have been explained.   Patient/Family/caregiver agrees with Plan of Care.     Evaluation Time:             Signing Clinician: Valeria Batres PT

## 2024-07-23 ENCOUNTER — THERAPY VISIT (OUTPATIENT)
Dept: PHYSICAL THERAPY | Facility: CLINIC | Age: 54
End: 2024-07-23
Payer: COMMERCIAL

## 2024-07-23 DIAGNOSIS — M54.12 CERVICAL RADICULOPATHY: Primary | ICD-10-CM

## 2024-07-23 PROCEDURE — 97112 NEUROMUSCULAR REEDUCATION: CPT | Mod: GP | Performed by: PHYSICAL THERAPIST

## 2024-07-23 PROCEDURE — 97110 THERAPEUTIC EXERCISES: CPT | Mod: GP | Performed by: PHYSICAL THERAPIST

## 2024-07-27 DIAGNOSIS — I10 ESSENTIAL HYPERTENSION: ICD-10-CM

## 2024-07-29 RX ORDER — AMLODIPINE BESYLATE 5 MG/1
5 TABLET ORAL DAILY
Qty: 90 TABLET | Refills: 0 | Status: SHIPPED | OUTPATIENT
Start: 2024-07-29 | End: 2024-10-03

## 2024-07-29 RX ORDER — IRBESARTAN 150 MG/1
150 TABLET ORAL AT BEDTIME
Qty: 90 TABLET | Refills: 0 | Status: SHIPPED | OUTPATIENT
Start: 2024-07-29 | End: 2024-10-03

## 2024-08-07 ENCOUNTER — THERAPY VISIT (OUTPATIENT)
Dept: PHYSICAL THERAPY | Facility: CLINIC | Age: 54
End: 2024-08-07
Payer: COMMERCIAL

## 2024-08-07 DIAGNOSIS — M54.12 CERVICAL RADICULOPATHY: Primary | ICD-10-CM

## 2024-08-07 PROCEDURE — 97140 MANUAL THERAPY 1/> REGIONS: CPT | Mod: GP | Performed by: PHYSICAL THERAPIST

## 2024-08-07 PROCEDURE — 97110 THERAPEUTIC EXERCISES: CPT | Mod: GP | Performed by: PHYSICAL THERAPIST

## 2024-08-15 ENCOUNTER — THERAPY VISIT (OUTPATIENT)
Dept: PHYSICAL THERAPY | Facility: CLINIC | Age: 54
End: 2024-08-15
Payer: COMMERCIAL

## 2024-08-15 DIAGNOSIS — M54.12 CERVICAL RADICULOPATHY: Primary | ICD-10-CM

## 2024-08-15 PROCEDURE — 97110 THERAPEUTIC EXERCISES: CPT | Mod: GP | Performed by: PHYSICAL THERAPIST

## 2024-08-15 PROCEDURE — 97112 NEUROMUSCULAR REEDUCATION: CPT | Mod: GP | Performed by: PHYSICAL THERAPIST

## 2024-08-30 ENCOUNTER — PATIENT OUTREACH (OUTPATIENT)
Dept: CARE COORDINATION | Facility: CLINIC | Age: 54
End: 2024-08-30
Payer: COMMERCIAL

## 2024-09-18 ENCOUNTER — THERAPY VISIT (OUTPATIENT)
Dept: PHYSICAL THERAPY | Facility: CLINIC | Age: 54
End: 2024-09-18
Payer: COMMERCIAL

## 2024-09-18 DIAGNOSIS — R20.2 NUMBNESS AND TINGLING OF RIGHT UPPER EXTREMITY: ICD-10-CM

## 2024-09-18 DIAGNOSIS — R20.0 NUMBNESS AND TINGLING OF RIGHT UPPER EXTREMITY: ICD-10-CM

## 2024-09-18 DIAGNOSIS — M54.12 CERVICAL RADICULOPATHY: Primary | ICD-10-CM

## 2024-09-18 PROCEDURE — 97110 THERAPEUTIC EXERCISES: CPT | Mod: GP | Performed by: PHYSICAL THERAPIST

## 2024-09-18 PROCEDURE — 97112 NEUROMUSCULAR REEDUCATION: CPT | Mod: GP | Performed by: PHYSICAL THERAPIST

## 2024-09-28 SDOH — HEALTH STABILITY: PHYSICAL HEALTH: ON AVERAGE, HOW MANY DAYS PER WEEK DO YOU ENGAGE IN MODERATE TO STRENUOUS EXERCISE (LIKE A BRISK WALK)?: 1 DAY

## 2024-09-28 SDOH — HEALTH STABILITY: PHYSICAL HEALTH: ON AVERAGE, HOW MANY MINUTES DO YOU ENGAGE IN EXERCISE AT THIS LEVEL?: 10 MIN

## 2024-09-28 ASSESSMENT — SOCIAL DETERMINANTS OF HEALTH (SDOH): HOW OFTEN DO YOU GET TOGETHER WITH FRIENDS OR RELATIVES?: ONCE A WEEK

## 2024-10-03 ENCOUNTER — OFFICE VISIT (OUTPATIENT)
Dept: FAMILY MEDICINE | Facility: CLINIC | Age: 54
End: 2024-10-03
Payer: COMMERCIAL

## 2024-10-03 VITALS
WEIGHT: 188.7 LBS | BODY MASS INDEX: 31.44 KG/M2 | HEIGHT: 65 IN | DIASTOLIC BLOOD PRESSURE: 96 MMHG | HEART RATE: 74 BPM | RESPIRATION RATE: 15 BRPM | OXYGEN SATURATION: 100 % | TEMPERATURE: 98.7 F | SYSTOLIC BLOOD PRESSURE: 147 MMHG

## 2024-10-03 DIAGNOSIS — Z00.00 ROUTINE GENERAL MEDICAL EXAMINATION AT A HEALTH CARE FACILITY: ICD-10-CM

## 2024-10-03 DIAGNOSIS — I10 ESSENTIAL HYPERTENSION: Primary | ICD-10-CM

## 2024-10-03 LAB
ANION GAP SERPL CALCULATED.3IONS-SCNC: 9 MMOL/L (ref 7–15)
BUN SERPL-MCNC: 11.5 MG/DL (ref 6–20)
CALCIUM SERPL-MCNC: 9.5 MG/DL (ref 8.8–10.4)
CHLORIDE SERPL-SCNC: 101 MMOL/L (ref 98–107)
CHOLEST SERPL-MCNC: 238 MG/DL
CREAT SERPL-MCNC: 0.58 MG/DL (ref 0.51–0.95)
EGFRCR SERPLBLD CKD-EPI 2021: >90 ML/MIN/1.73M2
FASTING STATUS PATIENT QL REPORTED: YES
FASTING STATUS PATIENT QL REPORTED: YES
GLUCOSE SERPL-MCNC: 100 MG/DL (ref 70–99)
HCO3 SERPL-SCNC: 27 MMOL/L (ref 22–29)
HDLC SERPL-MCNC: 61 MG/DL
LDLC SERPL CALC-MCNC: 152 MG/DL
NONHDLC SERPL-MCNC: 177 MG/DL
POTASSIUM SERPL-SCNC: 4.4 MMOL/L (ref 3.4–5.3)
SODIUM SERPL-SCNC: 137 MMOL/L (ref 135–145)
TRIGL SERPL-MCNC: 126 MG/DL

## 2024-10-03 PROCEDURE — 90471 IMMUNIZATION ADMIN: CPT | Performed by: NURSE PRACTITIONER

## 2024-10-03 PROCEDURE — 80048 BASIC METABOLIC PNL TOTAL CA: CPT | Performed by: NURSE PRACTITIONER

## 2024-10-03 PROCEDURE — 36415 COLL VENOUS BLD VENIPUNCTURE: CPT | Performed by: NURSE PRACTITIONER

## 2024-10-03 PROCEDURE — 99213 OFFICE O/P EST LOW 20 MIN: CPT | Mod: 25 | Performed by: NURSE PRACTITIONER

## 2024-10-03 PROCEDURE — 99396 PREV VISIT EST AGE 40-64: CPT | Mod: 25 | Performed by: NURSE PRACTITIONER

## 2024-10-03 PROCEDURE — 80061 LIPID PANEL: CPT | Performed by: NURSE PRACTITIONER

## 2024-10-03 PROCEDURE — 90677 PCV20 VACCINE IM: CPT | Performed by: NURSE PRACTITIONER

## 2024-10-03 RX ORDER — AMLODIPINE BESYLATE 5 MG/1
5 TABLET ORAL DAILY
Qty: 90 TABLET | Refills: 3 | Status: SHIPPED | OUTPATIENT
Start: 2024-10-03

## 2024-10-03 RX ORDER — IRBESARTAN 300 MG/1
300 TABLET ORAL AT BEDTIME
Qty: 90 TABLET | Refills: 1 | Status: SHIPPED | OUTPATIENT
Start: 2024-10-03

## 2024-10-03 ASSESSMENT — PAIN SCALES - GENERAL: PAINLEVEL: NO PAIN (0)

## 2024-10-03 NOTE — PROGRESS NOTES
"Preventive Care Visit  [facility] RICHARD Fonseca Ra, CNP, Family Practice  Oct 3, 2024      Assessment & Plan     Essential hypertension  Above goal.  Asymptomatic.  Increase dose of irbesartan.  Nurse only bp check and lab only in 3 weeks.  Pt agrees with plan and verbalized understanding.  - irbesartan (AVAPRO) 300 MG tablet; Take 1 tablet (300 mg) by mouth at bedtime.  - amLODIPine (NORVASC) 5 MG tablet; Take 1 tablet (5 mg) by mouth daily.  - Basic metabolic panel  (Ca, Cl, CO2, Creat, Gluc, K, Na, BUN); Future  - **Basic metabolic panel FUTURE 14d; Future  - Basic metabolic panel  (Ca, Cl, CO2, Creat, Gluc, K, Na, BUN)    Routine general medical examination at a health care facility  - MA Screen Bilateral w/Ezequiel; Future  - Lipid panel reflex to direct LDL Fasting; Future  - Lipid panel reflex to direct LDL Fasting            Nicotine/Tobacco Cessation  She reports that she has been smoking cigarettes. She has been exposed to tobacco smoke. She has never used smokeless tobacco.  Nicotine/Tobacco Cessation Plan        BMI  Estimated body mass index is 31.4 kg/m  as calculated from the following:    Height as of this encounter: 1.651 m (5' 5\").    Weight as of this encounter: 85.6 kg (188 lb 11.2 oz).       Counseling  Appropriate preventive services were addressed with this patient via screening, questionnaire, or discussion as appropriate for fall prevention, nutrition, physical activity, Tobacco-use cessation, social engagement, weight loss and cognition.  Checklist reviewing preventive services available has been given to the patient.  Reviewed patient's diet, addressing concerns and/or questions.   She is at risk for lack of exercise and has been provided with information to increase physical activity for the benefit of her well-being.   She is at risk for psychosocial distress and has been provided with information to reduce risk.           Jeremiah ESPINOZA is a 54 year " old, presenting for the following:  Physical        10/3/2024     9:37 AM   Additional Questions   Roomed by MR   Accompanied by NA         10/3/2024     9:37 AM   Patient Reported Additional Medications   Patient reports taking the following new medications NA        Health Care Directive  Patient does not have a Health Care Directive or Living Will: Discussed advance care planning with patient; however, patient declined at this time.    HPI    PT IS FASTING         Doing well.  Taking medications as directed.  No cardiac or respiratory symptoms.  She has no side effects from meds.          9/28/2024   General Health   How would you rate your overall physical health? (!) FAIR   Feel stress (tense, anxious, or unable to sleep) Only a little      (!) STRESS CONCERN      9/28/2024   Nutrition   Three or more servings of calcium each day? Yes   Diet: Regular (no restrictions)   How many servings of fruit and vegetables per day? (!) 2-3   How many sweetened beverages each day? (!) 2            9/28/2024   Exercise   Days per week of moderate/strenous exercise 1 day   Average minutes spent exercising at this level 10 min      (!) EXERCISE CONCERN      9/28/2024   Social Factors   Frequency of gathering with friends or relatives Once a week   Worry food won't last until get money to buy more No   Food not last or not have enough money for food? No   Do you have housing? (Housing is defined as stable permanent housing and does not include staying ouside in a car, in a tent, in an abandoned building, in an overnight shelter, or couch-surfing.) Yes   Are you worried about losing your housing? No   Lack of transportation? No   Unable to get utilities (heat,electricity)? No            9/28/2024   Fall Risk   Fallen 2 or more times in the past year? No   Trouble with walking or balance? No             9/28/2024   Dental   Dentist two times every year? Yes            9/28/2024   TB Screening   Were you born outside of the [place]?  Yes              Today's PHQ-2 Score:       1/17/2024     5:10 AM   PHQ-2 ( 1999 [employer])   Q1: Little interest or pleasure in doing things 1   Q2: Feeling down, depressed or hopeless 0   PHQ-2 Score 1   Q1: Little interest or pleasure in doing things Several days   Q2: Feeling down, depressed or hopeless Not at all   PHQ-2 Score 1         9/29/2024   Substance Use   If I could quit smoking, I would Completely agree   I want to quit somking, worry about health affects Neutral   Willing to make a plan to quit smoking Neutral   Willing to cut down before quitting Completely agree        Social History     Tobacco Use    Smoking status: Light Smoker     Current packs/day: 0.00     Types: Cigarettes     Passive exposure: Current    Smokeless tobacco: Never    Tobacco comments:     1 pack a week   Vaping Use    Vaping status: Never Used   Substance Use Topics    Alcohol use: Yes    Drug use: No           11/10/2023   LAST FHS-7 RESULTS   1st degree relative breast or ovarian cancer Unknown   Any relative bilateral breast cancer Unknown   Any male have breast cancer Unknown   Any ONE woman have BOTH breast AND ovarian cancer Unknown   Any woman with breast cancer before 50yrs Unknown   2 or more relatives with breast AND/OR ovarian cancer Unknown   2 or more relatives with breast AND/OR bowel cancer Unknown                   9/28/2024   STI Screening   New sexual partner(s) since last STI/HIV test? No        History of abnormal Pap smear: No - age 30- 64 PAP with HPV every 5 years recommended        Latest Ref Rng & Units 7/29/2021     7:28 AM   PAP / HPV   PAP  Negative for Intraepithelial Lesion or Malignancy (NILM)    HPV 16 DNA Negative Negative    HPV 18 DNA Negative Negative    Other HR HPV Negative Negative      ASCVD Risk   The 10-year ASCVD risk score (Sriram TOVAR, et al., 2019) is: 8.9%    Values used to calculate the score:      Age: 54 years      Sex: Female      Is Non- : No       "Diabetic: No      Tobacco smoker: Yes      Systolic Blood Pressure: 147 mmHg      Is BP treated: Yes      HDL Cholesterol: 48 mg/dL      Total Cholesterol: 208 mg/dL           Reviewed and updated as needed this visit by Provider                    Patient Active Problem List   Diagnosis    Essential hypertension     No past surgical history on file.    Social History     Tobacco Use    Smoking status: Light Smoker     Current packs/day: 0.00     Types: Cigarettes     Passive exposure: Current    Smokeless tobacco: Never    Tobacco comments:     1 pack a week   Substance Use Topics    Alcohol use: Yes     Family History   Adopted: Yes             Review of Systems  Constitutional, HEENT, cardiovascular, pulmonary, gi and gu systems are negative, except as otherwise noted.     Objective    Exam  BP (!) 147/96 (BP Location: Right arm, Patient Position: Sitting, Cuff Size: Adult Large)   Pulse 74   Temp 98.7  F (37.1  C) (Oral)   Resp 15   Ht 1.651 m (5' 5\")   Wt 85.6 kg (188 lb 11.2 oz)   LMP 09/04/2024 (Approximate)   SpO2 100%   BMI 31.40 kg/m     Estimated body mass index is 31.4 kg/m  as calculated from the following:    Height as of this encounter: 1.651 m (5' 5\").    Weight as of this encounter: 85.6 kg (188 lb 11.2 oz).    Physical Exam  GENERAL: alert and no distress  EYES: Eyes grossly normal to inspection  HENT: ear canals and TM's normal, nose and mouth without ulcers or lesions  NECK: no adenopathy, no asymmetry, masses, or scars  RESP: lungs clear to auscultation - no rales, rhonchi or wheezes  CV: regular rate and rhythm, normal S1 S2, no S3 or S4, no murmur, click or rub, no peripheral edema  ABDOMEN: soft, nontender, no hepatosplenomegaly, no masses and bowel sounds normal  NEURO: Normal strength and tone, mentation intact and speech normal  PSYCH: mentation appears normal, affect normal/bright        Signed Electronically by: RICHARD Marks Ra CNP    "

## 2024-10-03 NOTE — PROGRESS NOTES
Prior to immunization administration, verified patients identity using patient s name and date of birth. Please see Immunization Activity for additional information.     Screening Questionnaire for Adult Immunization    Are you sick today?   No   Do you have allergies to medications, food, a vaccine component or latex?   No   Have you ever had a serious reaction after receiving a vaccination?   No   Do you have a long-term health problem with heart, lung, kidney, or metabolic disease (e.g., diabetes), asthma, a blood disorder, no spleen, complement component deficiency, a cochlear implant, or a spinal fluid leak?  Are you on long-term aspirin therapy?   No   Do you have cancer, leukemia, HIV/AIDS, or any other immune system problem?   No   Do you have a parent, brother, or sister with an immune system problem?   No   In the past 3 months, have you taken medications that affect  your immune system, such as prednisone, other steroids, or anticancer drugs; drugs for the treatment of rheumatoid arthritis, Crohn s disease, or psoriasis; or have you had radiation treatments?   No   Have you had a seizure, or a brain or other nervous system problem?   No   During the past year, have you received a transfusion of blood or blood    products, or been given immune (gamma) globulin or antiviral drug?   No   For women: Are you pregnant or is there a chance you could become       pregnant during the next month?   No   Have you received any vaccinations in the past 4 weeks?   No     Immunization questionnaire answers were all negative.        Patient instructed to remain in clinic for 15 minutes afterwards, and to report any adverse reactions.     Screening performed by Brenda Osorio MA on 10/3/2024 at 10:26 AM.

## 2024-10-03 NOTE — PATIENT INSTRUCTIONS
Patient Education   Preventive Care Advice   This is general advice given by our system to help you stay healthy. However, your care team may have specific advice just for you. Please talk to your care team about your preventive care needs.  Nutrition  Eat 5 or more servings of fruits and vegetables each day.  Try wheat bread, brown rice and whole grain pasta (instead of white bread, rice, and pasta).  Get enough calcium and vitamin D. Check the label on foods and aim for 100% of the RDA (recommended daily allowance).  Lifestyle  Exercise at least 150 minutes each week  (30 minutes a day, 5 days a week).  Do muscle strengthening activities 2 days a week. These help control your weight and prevent disease.  No smoking.  Wear sunscreen to prevent skin cancer.  Have a dental exam and cleaning every 6 months.  Yearly exams  See your health care team every year to talk about:  Any changes in your health.  Any medicines your care team has prescribed.  Preventive care, family planning, and ways to prevent chronic diseases.  Shots (vaccines)   HPV shots (up to age 26), if you've never had them before.  Hepatitis B shots (up to age 59), if you've never had them before.  COVID-19 shot: Get this shot when it's due.  Flu shot: Get a flu shot every year.  Tetanus shot: Get a tetanus shot every 10 years.  Pneumococcal, hepatitis A, and RSV shots: Ask your care team if you need these based on your risk.  Shingles shot (for age 50 and up)  General health tests  Diabetes screening:  Starting at age 35, Get screened for diabetes at least every 3 years.  If you are younger than age 35, ask your care team if you should be screened for diabetes.  Cholesterol test: At age 39, start having a cholesterol test every 5 years, or more often if advised.  Bone density scan (DEXA): At age 50, ask your care team if you should have this scan for osteoporosis (brittle bones).  Hepatitis C: Get tested at least once in your life.  STIs (sexually  transmitted infections)  Before age 24: Ask your care team if you should be screened for STIs.  After age 24: Get screened for STIs if you're at risk. You are at risk for STIs (including HIV) if:  You are sexually active with more than one person.  You don't use condoms every time.  You or a partner was diagnosed with a sexually transmitted infection.  If you are at risk for HIV, ask about PrEP medicine to prevent HIV.  Get tested for HIV at least once in your life, whether you are at risk for HIV or not.  Cancer screening tests  Cervical cancer screening: If you have a cervix, begin getting regular cervical cancer screening tests starting at age 21.  Breast cancer scan (mammogram): If you've ever had breasts, begin having regular mammograms starting at age 40. This is a scan to check for breast cancer.  Colon cancer screening: It is important to start screening for colon cancer at age 45.  Have a colonoscopy test every 10 years (or more often if you're at risk) Or, ask your provider about stool tests like a FIT test every year or Cologuard test every 3 years.  To learn more about your testing options, visit:   .  For help making a decision, visit:   https://bit.ly/we15449.  Prostate cancer screening test: If you have a prostate, ask your care team if a prostate cancer screening test (PSA) at age 55 is right for you.  Lung cancer screening: If you are a current or former smoker ages 50 to 80, ask your care team if ongoing lung cancer screenings are right for you.  For informational purposes only. Not to replace the advice of your health care provider. Copyright   2023 [facility] [employer]. All rights reserved. Clinically reviewed by the [facility] Transitions Program. Visto [id number] - REV 01/24.     Patient Education   Preventive Care Advice   This is general advice given by our system to help you stay healthy. However, your care team may have specific advice just for you. Please talk to your care team  about your preventive care needs.  Nutrition  Eat 5 or more servings of fruits and vegetables each day.  Try wheat bread, brown rice and whole grain pasta (instead of white bread, rice, and pasta).  Get enough calcium and vitamin D. Check the label on foods and aim for 100% of the RDA (recommended daily allowance).  Lifestyle  Exercise at least 150 minutes each week  (30 minutes a day, 5 days a week).  Do muscle strengthening activities 2 days a week. These help control your weight and prevent disease.  No smoking.  Wear sunscreen to prevent skin cancer.  Have a dental exam and cleaning every 6 months.  Yearly exams  See your health care team every year to talk about:  Any changes in your health.  Any medicines your care team has prescribed.  Preventive care, family planning, and ways to prevent chronic diseases.  Shots (vaccines)   HPV shots (up to age 26), if you've never had them before.  Hepatitis B shots (up to age 59), if you've never had them before.  COVID-19 shot: Get this shot when it's due.  Flu shot: Get a flu shot every year.  Tetanus shot: Get a tetanus shot every 10 years.  Pneumococcal, hepatitis A, and RSV shots: Ask your care team if you need these based on your risk.  Shingles shot (for age 50 and up)  General health tests  Diabetes screening:  Starting at age 35, Get screened for diabetes at least every 3 years.  If you are younger than age 35, ask your care team if you should be screened for diabetes.  Cholesterol test: At age 39, start having a cholesterol test every 5 years, or more often if advised.  Bone density scan (DEXA): At age 50, ask your care team if you should have this scan for osteoporosis (brittle bones).  Hepatitis C: Get tested at least once in your life.  STIs (sexually transmitted infections)  Before age 24: Ask your care team if you should be screened for STIs.  After age 24: Get screened for STIs if you're at risk. You are at risk for STIs (including HIV) if:  You are  sexually active with more than one person.  You don't use condoms every time.  You or a partner was diagnosed with a sexually transmitted infection.  If you are at risk for HIV, ask about PrEP medicine to prevent HIV.  Get tested for HIV at least once in your life, whether you are at risk for HIV or not.  Cancer screening tests  Cervical cancer screening: If you have a cervix, begin getting regular cervical cancer screening tests starting at age 21.  Breast cancer scan (mammogram): If you've ever had breasts, begin having regular mammograms starting at age 40. This is a scan to check for breast cancer.  Colon cancer screening: It is important to start screening for colon cancer at age 45.  Have a colonoscopy test every 10 years (or more often if you're at risk) Or, ask your provider about stool tests like a FIT test every year or Cologuard test every 3 years.  To learn more about your testing options, visit:   .  For help making a decision, visit:   https://bit.ly/qu88087.  Prostate cancer screening test: If you have a prostate, ask your care team if a prostate cancer screening test (PSA) at age 55 is right for you.  Lung cancer screening: If you are a current or former smoker ages 50 to 80, ask your care team if ongoing lung cancer screenings are right for you.  For informational purposes only. Not to replace the advice of your health care provider. Copyright   2023 [employer]. All rights reserved. Clinically reviewed by the [facility] Transitions Program. datatracker [id number] - REV 01/24.

## 2024-10-03 NOTE — LETTER
October 3, 2024      Corinna Barker  [address] [id number]        To Whom It May Concern,     Corinna Barker is under my medical supervision.  She is being treated for cervical radiculopathy and numbness and tingling of the right upper extremity.  She is utilizing physical therapy and it has been recommended that she also utilize massage as an intervention.  I have recommended she pursue this.            Sincerely,        RICHARD Marks Ra CNP

## 2024-10-08 ENCOUNTER — THERAPY VISIT (OUTPATIENT)
Dept: PHYSICAL THERAPY | Facility: CLINIC | Age: 54
End: 2024-10-08
Payer: COMMERCIAL

## 2024-10-08 DIAGNOSIS — M54.12 CERVICAL RADICULOPATHY: Primary | ICD-10-CM

## 2024-10-08 PROCEDURE — 97110 THERAPEUTIC EXERCISES: CPT | Mod: GP | Performed by: PHYSICAL THERAPIST

## 2024-10-08 PROCEDURE — 97112 NEUROMUSCULAR REEDUCATION: CPT | Mod: GP | Performed by: PHYSICAL THERAPIST

## 2024-10-29 ENCOUNTER — THERAPY VISIT (OUTPATIENT)
Dept: PHYSICAL THERAPY | Facility: CLINIC | Age: 54
End: 2024-10-29
Payer: COMMERCIAL

## 2024-10-29 DIAGNOSIS — M54.12 CERVICAL RADICULOPATHY: Primary | ICD-10-CM

## 2024-10-29 PROCEDURE — 97140 MANUAL THERAPY 1/> REGIONS: CPT | Mod: GP | Performed by: PHYSICAL THERAPIST

## 2024-10-29 PROCEDURE — 97110 THERAPEUTIC EXERCISES: CPT | Mod: GP | Performed by: PHYSICAL THERAPIST

## 2024-11-05 ENCOUNTER — ANCILLARY PROCEDURE (OUTPATIENT)
Dept: GENERAL RADIOLOGY | Facility: CLINIC | Age: 54
End: 2024-11-05
Attending: FAMILY MEDICINE
Payer: COMMERCIAL

## 2024-11-05 ENCOUNTER — OFFICE VISIT (OUTPATIENT)
Dept: ORTHOPEDICS | Facility: CLINIC | Age: 54
End: 2024-11-05
Payer: COMMERCIAL

## 2024-11-05 VITALS
WEIGHT: 188 LBS | DIASTOLIC BLOOD PRESSURE: 95 MMHG | BODY MASS INDEX: 31.32 KG/M2 | HEIGHT: 65 IN | SYSTOLIC BLOOD PRESSURE: 173 MMHG

## 2024-11-05 DIAGNOSIS — M79.672 PAIN OF BOTH HEELS: Primary | ICD-10-CM

## 2024-11-05 DIAGNOSIS — M76.62 ACHILLES TENDINITIS OF BOTH LOWER EXTREMITIES: ICD-10-CM

## 2024-11-05 DIAGNOSIS — M79.672 PAIN OF BOTH HEELS: ICD-10-CM

## 2024-11-05 DIAGNOSIS — M76.61 ACHILLES TENDINITIS OF BOTH LOWER EXTREMITIES: ICD-10-CM

## 2024-11-05 DIAGNOSIS — M72.2 PLANTAR FASCIITIS OF RIGHT FOOT: ICD-10-CM

## 2024-11-05 DIAGNOSIS — M79.671 PAIN OF BOTH HEELS: ICD-10-CM

## 2024-11-05 DIAGNOSIS — M79.671 PAIN OF BOTH HEELS: Primary | ICD-10-CM

## 2024-11-05 PROCEDURE — G2211 COMPLEX E/M VISIT ADD ON: HCPCS | Performed by: FAMILY MEDICINE

## 2024-11-05 PROCEDURE — 99204 OFFICE O/P NEW MOD 45 MIN: CPT | Performed by: FAMILY MEDICINE

## 2024-11-05 PROCEDURE — 73610 X-RAY EXAM OF ANKLE: CPT | Mod: TC | Performed by: FAMILY MEDICINE

## 2024-11-05 RX ORDER — DICLOFENAC SODIUM 75 MG/1
75 TABLET, DELAYED RELEASE ORAL 2 TIMES DAILY PRN
Qty: 60 TABLET | Refills: 0 | Status: SHIPPED | OUTPATIENT
Start: 2024-11-05

## 2024-11-05 NOTE — PROGRESS NOTES
"ASSESSMENT & PLAN  Patient Instructions     1. Pain of both heels    2. Achilles tendinitis of both lower extremities    3. Plantar fasciitis of right foot      -Patient has chronic bilateral heel pain due to Achilles tendinitis and right foot pain due to plantar fasciitis  -Patient will start formal physical therapy and home exercise program to increase range of motion and tolerance of weightbearing activities  -Patient will start diclofenac 75 mg twice daily for the next 2 to 4 weeks and then on as-needed basis as pain improves  -An order was placed for custom orthotics since patient has to stand and walk on concrete floors for work  -Patient will continue follow-up with me for further treatment and recommendations.  We discussed potential immobilization in the boot if symptoms do not improve  -Call direct clinic number [[phone]] at any time with questions or concerns.    Albert Yeo MD [facility]  [facility] [place]          -----    SUBJECTIVE  Corinna Barker is a/an 54 year old female who is seen as a self referral for evaluation of bilateral ankle pain. The patient is seen by themselves.    Onset: \"years\" with pain worsening significantly in the last month. Reports insidious onset without acute precipitating event.  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 No     Social Drivers of Health     Financial Resource Strain: Low Risk  (9/28/2024)    Financial Resource Strain     Within the past 12 months, have you or your family members you live with been unable to get utilities (heat, electricity) when it was really needed?: No   Food Insecurity: Low Risk  (9/28/2024)    Food Insecurity     Within the past 12 months, did you worry that your food would run out before you got money to buy more?: No     Within the past 12 months, did the food you bought just not last and you didn t have money to get more?: No   Transportation Needs: Low Risk  (9/28/2024)    Transportation Needs     Within the past 12 months, has lack of transportation kept you from medical appointments, getting your medicines, non-medical meetings or appointments, work, or from getting things that you need?: No   Physical Activity: Insufficiently Active (9/28/2024)    Exercise Vital Sign     Days of Exercise per Week: 1 day     Minutes of Exercise per Session: 10 min   Stress: No Stress Concern Present (9/28/2024)    [employer] [place] [place] [employer] - Occupational Stress Questionnaire     Feeling of Stress : Only a little   Social Connections: Unknown (9/28/2024)    Social Connection and Isolation Panel [NHANES]     Frequency of Social Gatherings with Friends and Family: Once a week   Interpersonal Safety: Low Risk  (10/3/2024)    Interpersonal Safety     Do you feel physically and emotionally safe where you currently live?: Yes     Within the past 12 months,  "have you been hit, slapped, kicked or otherwise physically hurt by someone?: No     Within the past 12 months, have you been humiliated or emotionally abused in other ways by your partner or ex-partner?: No   Housing Stability: Low Risk  (9/28/2024)    Housing Stability     Do you have housing? : Yes     Are you worried about losing your housing?: No          Patient's past medical, surgical, social, and family histories were reviewed today and no changes are noted.    REVIEW OF SYSTEMS:  10 point ROS is negative other than symptoms noted above in HPI, Past Medical History or as stated below  Constitutional: NEGATIVE for fever, chills, change in weight  Skin: NEGATIVE for worrisome rashes, moles or lesions  GI/: NEGATIVE for bowel or bladder changes  Neuro: NEGATIVE for weakness, dizziness or paresthesias    OBJECTIVE:  BP (!) 173/95   Ht 1.651 m (5' 5\")   Wt 85.3 kg (188 lb)   LMP 09/04/2024 (Approximate)   BMI 31.28 kg/m     General: healthy, alert and in no distress  HEENT: no scleral icterus or conjunctival erythema  Skin: no suspicious lesions or rash. No jaundice.  CV:  no pedal edema  Resp: normal respiratory effort without conversational dyspnea   Psych: normal mood and affect  Gait: normal steady gait with appropriate coordination and balance  Neuro: Normal light sensory exam of lower extremity  MSK:  BILATERAL ANKLE  Inspection:    No swelling or ecchymosis is observed  Palpation:    Tender about the achilles tendon and right plantar fascia origin. Remainder of bony and ligamentous landmarks are nontender.  Range of Motion:     Plantarflexion within normal limits / dorsiflexion within normal limits / inversion within normal limits / eversion within normal limits  Strength:    within normal limits  Special Tests:    negative anterior drawer, negative talar tilt, negative valgus stress, negative forced external rotation/eversion, negative Whiting sign, negative squeeze test.     Independent " visualization of the below image:  Recent Results (from the past 24 hours)   XR Ankle Bilateral G/E 3 Views    Narrative    Mild joint space narrowing and small osteophytes at the medial tibiotalar   joint line bilaterally with small enthesophyte at the insertion of the   Achilles tendon, left greater than right.  No acute fracture or   dislocation.       Albert Yeo MD [facility]

## 2024-11-05 NOTE — PATIENT INSTRUCTIONS
1. Pain of both heels    2. Achilles tendinitis of both lower extremities    3. Plantar fasciitis of right foot      -Patient has chronic bilateral heel pain due to Achilles tendinitis and right foot pain due to plantar fasciitis  -Patient will start formal physical therapy and home exercise program to increase range of motion and tolerance of weightbearing activities  -Patient will start diclofenac 75 mg twice daily for the next 2 to 4 weeks and then on as-needed basis as pain improves  -An order was placed for custom orthotics since patient has to stand and walk on concrete floors for work  -Patient will continue follow-up with me for further treatment and recommendations.  We discussed potential immobilization in the boot if symptoms do not improve  -Call direct clinic number [[phone]] at any time with questions or concerns.    Albert Yeo MD [facility]  [facility] [place]

## 2024-11-05 NOTE — LETTER
"11/5/2024      Corinna Barker  [address] [address] [address]      Dear Colleague,    Thank you for referring your patient, Corinna Barker, to the [facility] [place]. Please see a copy of my visit note below.    ASSESSMENT & PLAN  Patient Instructions     1. Pain of both heels    2. Achilles tendinitis of both lower extremities    3. Plantar fasciitis of right foot      -Patient has chronic bilateral heel pain due to Achilles tendinitis and right foot pain due to plantar fasciitis  -Patient will start formal physical therapy and home exercise program to increase range of motion and tolerance of weightbearing activities  -Patient will start diclofenac 75 mg twice daily for the next 2 to 4 weeks and then on as-needed basis as pain improves  -An order was placed for custom orthotics since patient has to stand and walk on concrete floors for work  -Patient will continue follow-up with me for further treatment and recommendations.  We discussed potential immobilization in the boot if symptoms do not improve  -Call direct clinic number [[phone]] at any time with questions or concerns.    Albert Yeo MD [facility]  [facility]          -----    SUBJECTIVE  Corinna Barker is a/an 54 year old female who is seen as a self referral for evaluation of bilateral ankle pain. The patient is seen by themselves.    Onset: \"years\" with pain worsening significantly in the last month. Reports insidious onset without acute precipitating event.  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 No     Social Drivers of Health     Financial Resource Strain: Low Risk  (9/28/2024)    Financial Resource Strain      Within the past 12 months, have you or your family members you live with been unable to get utilities (heat, electricity) when it was really needed?: No   Food Insecurity: Low Risk  (9/28/2024)    Food Insecurity      Within the past 12 months, did you worry that your food would run out before you got money to buy more?: No      Within the past 12 months, did the food you bought just not last and you didn t have money to get more?: No   Transportation Needs: Low Risk  (9/28/2024)    Transportation Needs      Within the past 12 months, has lack of transportation kept you from medical appointments, getting your medicines, non-medical meetings or appointments, work, or from getting things that you need?: No   Physical Activity: Insufficiently Active (9/28/2024)    Exercise Vital Sign      Days of Exercise per Week: 1 day      Minutes of Exercise per Session: 10 min   Stress: No Stress Concern Present (9/28/2024)    [place] [employer] - Occupational Stress Questionnaire      Feeling of Stress : Only a little   Social Connections: Unknown (9/28/2024)    Social Connection and Isolation  "Panel [NHANES]      Frequency of Social Gatherings with Friends and Family: Once a week   Interpersonal Safety: Low Risk  (10/3/2024)    Interpersonal Safety      Do you feel physically and emotionally safe where you currently live?: Yes      Within the past 12 months, have you been hit, slapped, kicked or otherwise physically hurt by someone?: No      Within the past 12 months, have you been humiliated or emotionally abused in other ways by your partner or ex-partner?: No   Housing Stability: Low Risk  (9/28/2024)    Housing Stability      Do you have housing? : Yes      Are you worried about losing your housing?: No          Patient's past medical, surgical, social, and family histories were reviewed today and no changes are noted.    REVIEW OF SYSTEMS:  10 point ROS is negative other than symptoms noted above in HPI, Past Medical History or as stated below  Constitutional: NEGATIVE for fever, chills, change in weight  Skin: NEGATIVE for worrisome rashes, moles or lesions  GI/: NEGATIVE for bowel or bladder changes  Neuro: NEGATIVE for weakness, dizziness or paresthesias    OBJECTIVE:  BP (!) 173/95   Ht 1.651 m (5' 5\")   Wt 85.3 kg (188 lb)   LMP 09/04/2024 (Approximate)   BMI 31.28 kg/m     General: healthy, alert and in no distress  HEENT: no scleral icterus or conjunctival erythema  Skin: no suspicious lesions or rash. No jaundice.  CV:  no pedal edema  Resp: normal respiratory effort without conversational dyspnea   Psych: normal mood and affect  Gait: normal steady gait with appropriate coordination and balance  Neuro: Normal light sensory exam of lower extremity  MSK:  BILATERAL ANKLE  Inspection:    No swelling or ecchymosis is observed  Palpation:    Tender about the achilles tendon and right plantar fascia origin. Remainder of bony and ligamentous landmarks are nontender.  Range of Motion:     Plantarflexion within normal limits / dorsiflexion within normal limits / inversion within normal limits / " eversion within normal limits  Strength:    within normal limits  Special Tests:    negative anterior drawer, negative talar tilt, negative valgus stress, negative forced external rotation/eversion, negative Whiting sign, negative squeeze test.     Independent visualization of the below image:  Recent Results (from the past 24 hours)   XR Ankle Bilateral G/E 3 Views    Narrative    Mild joint space narrowing and small osteophytes at the medial tibiotalar   joint line bilaterally with small enthesophyte at the insertion of the   Achilles tendon, left greater than right.  No acute fracture or   dislocation.       Albert Yeo MD [facility]      Again, thank you for allowing me to participate in the care of your patient.        Sincerely,        Albert Yeo, MD

## 2024-11-08 ASSESSMENT — ACTIVITIES OF DAILY LIVING (ADL)
RUNNING_ON_EVEN_GROUND: MODERATE DIFFICULTY
GETTING_INTO_OR_OUT_OF_A_CAR: A LITTLE BIT OF DIFFICULTY
YOUR_USUAL_HOBBIES,_RECREATIONAL_OR_SPORTING_ACTIVITIES: A LITTLE BIT OF DIFFICULTY
ANY_OF_YOUR_USUAL_WORK,_HOUSEWORK_OR_SCHOOL_ACTIVITIES: NO DIFFICULTY
STANDING_FOR_1_HOUR: A LITTLE BIT OF DIFFICULTY
GOING_UP_OR_DOWN_10_STAIRS: A LITTLE BIT OF DIFFICULTY
SHOPPING: A LITTLE BIT OF DIFFICULTY
LIFTING_AN_OBJECT,_LIKE_A_BAG_OF_GROCERIES_FROM_THE_FLOOR: NO DIFFICULTY
SITTING_FOR_1_HOUR: A LITTLE BIT OF DIFFICULTY
GETTING_INTO_AND_OUT_OF_A_BATH: NO DIFFICULTY
PERFORMING_LIGHT_ACTIVITIES_AROUND_YOUR_HOME: A LITTLE BIT OF DIFFICULTY
PERFORMING_HEAVY_ACTIVITIES_AROUND_YOUR_HOME: A LITTLE BIT OF DIFFICULTY
WALKING_BETWEEN_ROOMS: NO DIFFICULTY
MAKING_SHARP_TURNS_WHILE_RUNNING_FAST: NO DIFFICULTY
LEFS_SCORE(%): 0
PUTTING_ON_YOUR_SHOES_OR_SOCKS: NO DIFFICULTY
LEFS_RAW_SCORE: 0
ROLLING_OVER_IN_BED: A LITTLE BIT OF DIFFICULTY
RUNNING_ON_UNEVEN_GROUND: MODERATE DIFFICULTY
PLEASE_INDICATE_YOR_PRIMARY_REASON_FOR_REFERRAL_TO_THERAPY:: FOOT AND/OR ANKLE
SQUATTING: NO DIFFICULTY
WALKING_2_BLOCKS: A LITTLE BIT OF DIFFICULTY
WALKING_A_MILE: MODERATE DIFFICULTY

## 2024-11-11 ENCOUNTER — THERAPY VISIT (OUTPATIENT)
Dept: PHYSICAL THERAPY | Facility: CLINIC | Age: 54
End: 2024-11-11
Attending: FAMILY MEDICINE
Payer: COMMERCIAL

## 2024-11-11 DIAGNOSIS — M76.61 ACHILLES TENDINITIS OF BOTH LOWER EXTREMITIES: ICD-10-CM

## 2024-11-11 DIAGNOSIS — M72.2 PLANTAR FASCIITIS OF RIGHT FOOT: ICD-10-CM

## 2024-11-11 DIAGNOSIS — M79.672 PAIN OF BOTH HEELS: ICD-10-CM

## 2024-11-11 DIAGNOSIS — M79.671 PAIN OF BOTH HEELS: ICD-10-CM

## 2024-11-11 DIAGNOSIS — M76.62 ACHILLES TENDINITIS OF BOTH LOWER EXTREMITIES: ICD-10-CM

## 2024-11-11 PROCEDURE — 97140 MANUAL THERAPY 1/> REGIONS: CPT | Mod: GP | Performed by: PHYSICAL THERAPIST

## 2024-11-11 PROCEDURE — 97110 THERAPEUTIC EXERCISES: CPT | Mod: GP | Performed by: PHYSICAL THERAPIST

## 2024-11-11 PROCEDURE — 97161 PT EVAL LOW COMPLEX 20 MIN: CPT | Mod: GP | Performed by: PHYSICAL THERAPIST

## 2024-11-11 NOTE — PROGRESS NOTES
PHYSICAL THERAPY EVALUATION  Type of Visit: Evaluation       Fall Risk Screen:  Fall screen completed by: PT  Have you fallen 2 or more times in the past year?: No  Have you fallen and had an injury in the past year?: No  Is patient a fall risk?: No    Subjective         Presenting condition or subjective complaint:    Date of onset:      Relevant medical history:     Dates & types of surgery:      Prior diagnostic imaging/testing results: (Patient-Rptd) X-ray     Prior therapy history for the same diagnosis, illness or injury: (Patient-Rptd) No      Prior Level of Function  Transfers: Independent  Ambulation: Independent  ADL: Independent      Living Environment  Social support: (Patient-Rptd) With a significant other or spouse   Type of home: (Patient-Rptd) House   Stairs to enter the home: (Patient-Rptd) No       Ramp: (Patient-Rptd) No   Stairs inside the home: (Patient-Rptd) Yes (Patient-Rptd) 15 Is there a railing: (Patient-Rptd) Yes     Help at home: (Patient-Rptd) None  Equipment owned:       Employment: (Patient-Rptd) Yes (Patient-Rptd) Machine shop  Hobbies/Interests:      Patient goals for therapy:      Pain assessment: Pain present  Location: bilat achilles, bilat PF/Rating: R=8/10, L=4/10    Reports she has been having chronic issues with her feet and ankles (10 to 15 years) secondary to walking on concrete floors at work---reports she has never had it treatment for this condition; reports pain is worse with prolonged walking, getting up from sitting down, am; Better with on the move until sitting for a period of time and then getting back up to walk;      Objective   ANKLE:    Standing Posture: toe out;     Gait: toe out, decreased push off, antalgic on R    SL Balance:   L: 15 sec (R hip drop)  R: 10 sec (L hip drop)    ROM/Strength: (* indicates patient's pain)   AROM L AROM R MMT L MMT R   Dorsiflexion 0 0     Plantarflexion  wnl SL heel raises 10/10 SL heel raises unable due to pain/10   Inversion  (prone) wnl wnl 5/5 4/5   Eversion (prone) wnl wnl 5/5 5/5   G Toe Ext 60 60 5/5 5/5   G Toe Flex wnl wnl 4/5 4/5     Other Tests:  -Closed Chain Dorsiflexion:       R= cm ; L= cm    Edema:    General: some mild swelling noted over distal 1/3  R achilles  Figure 8: L:  ; R:     Palpation: tenderness along Bilat Achilles, medial calcaneal tubercle at PF insertion; note a nodule present on R achilles (exquisite tenderness along tendon)    Ankle/Foot Mobilizations (hyper vs hypo):   - Talocrual: hypo bilat  - Subtalar: wnl  - Talonavicular: wnl  - Calcaneocuboid: wnl  - Cubonavicular-cuniform: wnl    Special Tests:   L R   Anterior Drawer     Posterior Drawer     Valgus Stress     Varus Stress     External Rotation     Whiting's (Achilles)     Calcaneal tap     Squeeze test     Windlass Test     Linda's (DVT)           Assessment & Plan   CLINICAL IMPRESSIONS  Medical Diagnosis: bilat plantar fascitis, achilles tendonitis    Treatment Diagnosis: decreased ROM, decreased strength, impaired function, pain   Impression/Assessment: Patient is a 54 year old female with bilateral foot and ankle complaints.  The following significant findings have been identified: Pain, Decreased ROM/flexibility, Decreased joint mobility, Decreased strength, Impaired balance, Inflammation, Impaired gait, and Decreased activity tolerance. These impairments interfere with their ability to perform work tasks, recreational activities, and community mobility as compared to previous level of function.     Clinical Decision Making (Complexity):  Clinical Presentation: Stable/Uncomplicated  Clinical Presentation Rationale: based on medical and personal factors listed in PT evaluation  Clinical Decision Making (Complexity): Low complexity    PLAN OF CARE  Treatment Interventions:  Interventions: Manual Therapy, Neuromuscular Re-education, Therapeutic Activity, Therapeutic Exercise    Long Term Goals     PT Goal 1  Goal Identifier: ambulation  Goal  Description: patient will ambulate 1 mile without AD or gait deviations.  Rationale: to maximize safety and independence with performance of ADLs and functional tasks;to maximize safety and independence within the home;to maximize safety and independence within the community  Target Date: 01/06/25      Frequency of Treatment: 1 x week  Duration of Treatment: 8 weeks    Recommended Referrals to Other Professionals:   Education Assessment:        Risks and benefits of evaluation/treatment have been explained.   Patient/Family/caregiver agrees with Plan of Care.     Evaluation Time:        Evaluation Only     Signing Clinician: Valeria Batres PT

## 2024-11-22 ENCOUNTER — ANCILLARY PROCEDURE (OUTPATIENT)
Dept: MAMMOGRAPHY | Facility: CLINIC | Age: 54
End: 2024-11-22
Attending: NURSE PRACTITIONER
Payer: COMMERCIAL

## 2024-11-22 DIAGNOSIS — Z00.00 ROUTINE GENERAL MEDICAL EXAMINATION AT A HEALTH CARE FACILITY: ICD-10-CM

## 2024-11-22 PROCEDURE — 77063 BREAST TOMOSYNTHESIS BI: CPT | Mod: TC | Performed by: RADIOLOGY

## 2024-11-22 PROCEDURE — 77067 SCR MAMMO BI INCL CAD: CPT | Mod: TC | Performed by: RADIOLOGY

## 2024-11-26 ENCOUNTER — TELEPHONE (OUTPATIENT)
Dept: ORTHOPEDICS | Facility: CLINIC | Age: 54
End: 2024-11-26

## 2024-11-26 ENCOUNTER — THERAPY VISIT (OUTPATIENT)
Dept: PHYSICAL THERAPY | Facility: CLINIC | Age: 54
End: 2024-11-26
Payer: COMMERCIAL

## 2024-11-26 DIAGNOSIS — M76.61 ACHILLES TENDINITIS OF BOTH LOWER EXTREMITIES: ICD-10-CM

## 2024-11-26 DIAGNOSIS — M76.62 ACHILLES TENDINITIS OF BOTH LOWER EXTREMITIES: ICD-10-CM

## 2024-11-26 DIAGNOSIS — M79.672 PAIN OF BOTH HEELS: Primary | ICD-10-CM

## 2024-11-26 DIAGNOSIS — M76.61 ACHILLES TENDINITIS OF RIGHT LOWER EXTREMITY: Primary | ICD-10-CM

## 2024-11-26 DIAGNOSIS — M72.2 PLANTAR FASCIITIS OF RIGHT FOOT: ICD-10-CM

## 2024-11-26 DIAGNOSIS — M79.671 PAIN OF BOTH HEELS: Primary | ICD-10-CM

## 2024-11-26 NOTE — TELEPHONE ENCOUNTER
I spoke with patient's physical therapist Nely.  Patient is having significant pain with weightbearing while at work.  We discussed ordering a boot.  An order was placed today.  Patient will go down to the [employer] medical store to  the boot to wear while at work.

## 2024-12-04 ENCOUNTER — THERAPY VISIT (OUTPATIENT)
Dept: PHYSICAL THERAPY | Facility: CLINIC | Age: 54
End: 2024-12-04
Payer: COMMERCIAL

## 2024-12-04 DIAGNOSIS — M72.2 PLANTAR FASCIITIS OF RIGHT FOOT: ICD-10-CM

## 2024-12-04 DIAGNOSIS — M79.672 PAIN OF BOTH HEELS: Primary | ICD-10-CM

## 2024-12-04 DIAGNOSIS — M76.62 ACHILLES TENDINITIS OF BOTH LOWER EXTREMITIES: ICD-10-CM

## 2024-12-04 DIAGNOSIS — M79.671 PAIN OF BOTH HEELS: Primary | ICD-10-CM

## 2024-12-04 DIAGNOSIS — M76.61 ACHILLES TENDINITIS OF BOTH LOWER EXTREMITIES: ICD-10-CM

## 2024-12-12 ENCOUNTER — THERAPY VISIT (OUTPATIENT)
Dept: PHYSICAL THERAPY | Facility: CLINIC | Age: 54
End: 2024-12-12
Payer: COMMERCIAL

## 2024-12-12 DIAGNOSIS — M76.62 ACHILLES TENDINITIS OF BOTH LOWER EXTREMITIES: Primary | ICD-10-CM

## 2024-12-12 DIAGNOSIS — M76.61 ACHILLES TENDINITIS OF BOTH LOWER EXTREMITIES: Primary | ICD-10-CM

## 2024-12-12 DIAGNOSIS — M72.2 PLANTAR FASCIITIS OF RIGHT FOOT: ICD-10-CM

## 2024-12-18 ENCOUNTER — THERAPY VISIT (OUTPATIENT)
Dept: PHYSICAL THERAPY | Facility: CLINIC | Age: 54
End: 2024-12-18
Payer: COMMERCIAL

## 2024-12-18 DIAGNOSIS — M76.61 ACHILLES TENDINITIS OF BOTH LOWER EXTREMITIES: Primary | ICD-10-CM

## 2024-12-18 DIAGNOSIS — M72.2 PLANTAR FASCIITIS OF RIGHT FOOT: ICD-10-CM

## 2024-12-18 DIAGNOSIS — M76.62 ACHILLES TENDINITIS OF BOTH LOWER EXTREMITIES: Primary | ICD-10-CM

## 2024-12-18 PROCEDURE — 97110 THERAPEUTIC EXERCISES: CPT | Mod: GP | Performed by: PHYSICAL THERAPIST

## 2025-03-27 DIAGNOSIS — I10 ESSENTIAL HYPERTENSION: ICD-10-CM

## 2025-03-27 RX ORDER — IRBESARTAN 300 MG/1
300 TABLET ORAL AT BEDTIME
Qty: 30 TABLET | Refills: 0 | Status: SHIPPED | OUTPATIENT
Start: 2025-03-27

## 2025-04-14 ENCOUNTER — OFFICE VISIT (OUTPATIENT)
Dept: FAMILY MEDICINE | Facility: CLINIC | Age: 55
End: 2025-04-14
Payer: COMMERCIAL

## 2025-04-14 VITALS
WEIGHT: 185 LBS | RESPIRATION RATE: 16 BRPM | DIASTOLIC BLOOD PRESSURE: 105 MMHG | TEMPERATURE: 98.4 F | BODY MASS INDEX: 28.04 KG/M2 | HEIGHT: 68 IN | HEART RATE: 89 BPM | SYSTOLIC BLOOD PRESSURE: 169 MMHG | OXYGEN SATURATION: 99 %

## 2025-04-14 DIAGNOSIS — I10 ESSENTIAL HYPERTENSION: Primary | ICD-10-CM

## 2025-04-14 PROCEDURE — 3077F SYST BP >= 140 MM HG: CPT | Performed by: NURSE PRACTITIONER

## 2025-04-14 PROCEDURE — 1126F AMNT PAIN NOTED NONE PRSNT: CPT | Performed by: NURSE PRACTITIONER

## 2025-04-14 PROCEDURE — 3080F DIAST BP >= 90 MM HG: CPT | Performed by: NURSE PRACTITIONER

## 2025-04-14 PROCEDURE — 99213 OFFICE O/P EST LOW 20 MIN: CPT | Performed by: NURSE PRACTITIONER

## 2025-04-14 PROCEDURE — G2211 COMPLEX E/M VISIT ADD ON: HCPCS | Performed by: NURSE PRACTITIONER

## 2025-04-14 RX ORDER — AMLODIPINE BESYLATE 10 MG/1
10 TABLET ORAL DAILY
Qty: 90 TABLET | Refills: 0 | Status: SHIPPED | OUTPATIENT
Start: 2025-04-14

## 2025-04-14 ASSESSMENT — PAIN SCALES - GENERAL: PAINLEVEL_OUTOF10: NO PAIN (0)

## 2025-04-14 NOTE — PROGRESS NOTES
"  Assessment & Plan     Essential hypertension  Taking medications as directed.  Tolerating well.  Asymptomatic.  Above goal.  Will increase dose of amlodipine.  Follow up in 1 week for bp check.  Pt agrees with plan and verbalized understanding.  - amLODIPine (NORVASC) 10 MG tablet; Take 1 tablet (10 mg) by mouth daily.    The longitudinal plan of care for the diagnosis(es)/condition(s) as documented were addressed during this visit. Due to the added complexity in care, I will continue to support OLGA in the subsequent management and with ongoing continuity of care.        BMI  Estimated body mass index is 28.13 kg/m  as calculated from the following:    Height as of this encounter: 1.727 m (5' 8\").    Weight as of this encounter: 83.9 kg (185 lb).       Subjective   OLGA is a 54 year old, presenting for the following health issues:  Hypertension      4/14/2025    12:01 PM   Additional Questions   Roomed by alan west   Accompanied by self         4/14/2025    12:01 PM   Patient Reported Additional Medications   Patient reports taking the following new medications na     History of Present Illness       Hypertension: She presents for follow up of hypertension.  She does not check blood pressure  regularly outside of the clinic. Outpatient blood pressures have not been over 140/90. She does not follow a low salt diet.     She eats 2-3 servings of fruits and vegetables daily.She consumes 2 sweetened beverage(s) daily.She exercises with enough effort to increase her heart rate 9 or less minutes per day.  She exercises with enough effort to increase her heart rate 3 or less days per week. She is missing 1 dose(s) of medications per week.  She is not taking prescribed medications regularly due to remembering to take.        Taking medications as directed.  No chest pain, palpitations, sob.  No LE edema.  No exercise.  No diet changes.  Smoking cigarettes occasionally.        Review of Systems  Constitutional, HEENT, " "cardiovascular, pulmonary, gi and gu systems are negative, except as otherwise noted.      Objective    BP (!) 169/105   Pulse 89   Temp 98.4  F (36.9  C) (Oral)   Resp 16   Ht 1.727 m (5' 8\")   Wt 83.9 kg (185 lb)   SpO2 99%   BMI 28.13 kg/m    Body mass index is 28.13 kg/m .  Physical Exam   GENERAL: alert and no distress  RESP: lungs clear to auscultation - no rales, rhonchi or wheezes  CV: regular rate and rhythm, normal S1 S2, no S3 or S4, no murmur, click or rub, no peripheral edema   PSYCH: mentation appears normal, affect normal/bright            Signed Electronically by: RICHARD Estrada CNP    "

## 2025-04-14 NOTE — PROGRESS NOTES
ASSESSMENT & PLAN  Patient Instructions     1. Chronic left shoulder pain    2. Biceps tendinitis of left shoulder    3. Subacromial bursitis of left shoulder joint      - Patient has chronic left shoulder pain due to biceps tendinitis and subacromial bursitis  -Xrays taken in office today show no acute fracture, dislocation or significant degenerative osseous abnormalities  -Patient will start formal physical therapy and home exercise program  -Patient will start diclofenac 75 milligrams twice a day as needed for pain.  Patient to start Flexeril 10 mg at bedtime for nighttime pain  -We discussed potential cortisone injection if pain does not improve  -Patient will continue to follow-up with me for further treatment and recommendations.    -Call direct clinic number [[phone]] at any time with questions or concerns.    Albert Yeo MD [facility]  [facility]        -----    SUBJECTIVE  Corinna Barker is a/an 54 year old Right handed female who is seen as a self referral for evaluation of left shoulder pain. The patient is seen by themselves.    Onset: 6 month(s) ago. Reports insidious onset without acute precipitating event.  Location of Pain: left shoulder anterior and posterior  Rating of Pain at worst: 7/10  Rating of Pain Currently: 2/10  Worsened by: lift, reaching ,any weight in the arm, driving, putting coat on  Better with: nothing  Treatments tried: ice, Tylenol, home exercises, and chiropractic care  Associated symptoms: weakness of shoulder,hand is cold  Orthopedic history: YES - Date: right shoulder 2024  Relevant surgical history: NO  Social history: social history: works at machine shop    Past Medical History:   Diagnosis Date    Hypertension      Social History     Socioeconomic History    Marital status:    Tobacco Use    Smoking status: Light Smoker     Current packs/day: 0.00     Types: Cigarettes     Passive exposure: Current     Smokeless tobacco: Never    Tobacco comments:     1 pack a week   Vaping Use    Vaping status: Never Used   Substance and Sexual Activity    Alcohol use: Yes    Drug use: No    Sexual activity: Yes     Partners: Male     Birth control/protection: None   Other Topics Concern    Parent/sibling w/ CABG, MI or angioplasty before 65F 55M? No     Social Drivers of Health     Financial Resource Strain: Low Risk  (9/28/2024)    Financial Resource Strain     Within the past 12 months, have you or your family members you live with been unable to get utilities (heat, electricity) when it was really needed?: No   Food Insecurity: Low Risk  (9/28/2024)    Food Insecurity     Within the past 12 months, did you worry that your food would run out before you got money to buy more?: No     Within the past 12 months, did the food you bought just not last and you didn t have money to get more?: No   Transportation Needs: Low Risk  (9/28/2024)    Transportation Needs     Within the past 12 months, has lack of transportation kept you from medical appointments, getting your medicines, non-medical meetings or appointments, work, or from getting things that you need?: No   Physical Activity: Insufficiently Active (9/28/2024)    Exercise Vital Sign     Days of Exercise per Week: 1 day     Minutes of Exercise per Session: 10 min   Stress: No Stress Concern Present (9/28/2024)    [employer] [place] [employer] - Occupational Stress Questionnaire     Feeling of Stress : Only a little   Social Connections: Unknown (9/28/2024)    Social Connection and Isolation Panel [NHANES]     Frequency of Social Gatherings with Friends and Family: Once a week   Interpersonal Safety: Low Risk  (4/14/2025)    Interpersonal Safety     Do you feel physically and emotionally safe where you currently live?: Yes     Within the past 12 months, have you been hit, slapped, kicked or otherwise physically hurt by someone?: No     Within the past 12 months, have  you been humiliated or emotionally abused in other ways by your partner or ex-partner?: No   Housing Stability: Low Risk  (9/28/2024)    Housing Stability     Do you have housing? : Yes     Are you worried about losing your housing?: No         Patient's past medical, surgical, social, and family histories were reviewed today and no changes are noted.    REVIEW OF SYSTEMS:  10 point ROS is negative other than symptoms noted above in HPI, Past Medical History or as stated below  Constitutional: NEGATIVE for fever, chills, change in weight  Skin: NEGATIVE for worrisome rashes, moles or lesions  GI/: NEGATIVE for bowel or bladder changes  Neuro: NEGATIVE for weakness, dizziness or paresthesias    OBJECTIVE:  There were no vitals taken for this visit.   General: healthy, alert and in no distress  HEENT: no scleral icterus or conjunctival erythema  Skin: no suspicious lesions or rash. No jaundice.  CV: regular rhythm by palpation  Resp: normal respiratory effort without conversational dyspnea   Psych: normal mood and affect  Gait: normal steady gait with appropriate coordination and balance  Neuro: normal light touch sensory exam of the bilateral upper extremities.    MSK:  LEFT SHOULDER  Inspection:    no swelling, bruising, discoloration, or obvious deformity or asymmetry  Palpation:    Tender about the proximal biceps tendon. Remainder of bony and tendinous landmarks are nontender.  Active Range of Motion:     Abduction normal0, FF normal0, ER normal0, IR L4.    Strength:    Scapular plane abduction painful,  ER grossly intact, IR grossly intact, biceps not tested, triceps not tested  Special Tests:    Positive: Neer's, Mcdermott', crossed arm adduction, [place]'s, and Speed's    Negative: supraspinatus (empty can), drop arm/painful arc, and Yergason's      Independent visualization of the below image:  No results found for this or any previous visit (from the past 24 hours).    Personal review of x-rays taken off  today show no acute fracture, dislocation bone spurs or significant joint space narrowing.    Albert Yeo MD [facility]

## 2025-04-16 ENCOUNTER — ANCILLARY PROCEDURE (OUTPATIENT)
Dept: GENERAL RADIOLOGY | Facility: CLINIC | Age: 55
End: 2025-04-16
Attending: FAMILY MEDICINE
Payer: COMMERCIAL

## 2025-04-16 ENCOUNTER — OFFICE VISIT (OUTPATIENT)
Dept: ORTHOPEDICS | Facility: CLINIC | Age: 55
End: 2025-04-16
Payer: COMMERCIAL

## 2025-04-16 DIAGNOSIS — M75.22 BICEPS TENDINITIS OF LEFT SHOULDER: ICD-10-CM

## 2025-04-16 DIAGNOSIS — M75.52 SUBACROMIAL BURSITIS OF LEFT SHOULDER JOINT: ICD-10-CM

## 2025-04-16 DIAGNOSIS — G89.29 CHRONIC LEFT SHOULDER PAIN: ICD-10-CM

## 2025-04-16 DIAGNOSIS — M25.512 CHRONIC LEFT SHOULDER PAIN: Primary | ICD-10-CM

## 2025-04-16 DIAGNOSIS — G89.29 CHRONIC LEFT SHOULDER PAIN: Primary | ICD-10-CM

## 2025-04-16 DIAGNOSIS — M25.512 CHRONIC LEFT SHOULDER PAIN: ICD-10-CM

## 2025-04-16 PROCEDURE — 73030 X-RAY EXAM OF SHOULDER: CPT | Mod: TC | Performed by: RADIOLOGY

## 2025-04-16 PROCEDURE — 99214 OFFICE O/P EST MOD 30 MIN: CPT | Performed by: FAMILY MEDICINE

## 2025-04-16 PROCEDURE — G2211 COMPLEX E/M VISIT ADD ON: HCPCS | Performed by: FAMILY MEDICINE

## 2025-04-16 RX ORDER — DICLOFENAC SODIUM 75 MG/1
75 TABLET, DELAYED RELEASE ORAL 2 TIMES DAILY PRN
Qty: 60 TABLET | Refills: 0 | Status: SHIPPED | OUTPATIENT
Start: 2025-04-16

## 2025-04-16 RX ORDER — CYCLOBENZAPRINE HCL 10 MG
10 TABLET ORAL
Qty: 30 TABLET | Refills: 0 | Status: SHIPPED | OUTPATIENT
Start: 2025-04-16

## 2025-04-16 NOTE — PATIENT INSTRUCTIONS
1. Chronic left shoulder pain    2. Biceps tendinitis of left shoulder    3. Subacromial bursitis of left shoulder joint      - Patient has chronic left shoulder pain due to biceps tendinitis and subacromial bursitis  -Xrays taken in office today show no acute fracture, dislocation or significant degenerative osseous abnormalities  -Patient will start formal physical therapy and home exercise program  -Patient will start diclofenac 75 milligrams twice a day as needed for pain.  Patient to start Flexeril 10 mg at bedtime for nighttime pain  -We discussed potential cortisone injection if pain does not improve  -Patient will continue to follow-up with me for further treatment and recommendations.    -Call direct clinic number [[phone]] at any time with questions or concerns.    Albert Yeo MD [facility]  [facility] [place]

## 2025-04-16 NOTE — LETTER
4/16/2025      Corinna Barker  [id number] [address] [address]      Dear Colleague,    Thank you for referring your patient, Corinna Barker, to the [facility] [place]. Please see a copy of my visit note below.    ASSESSMENT & PLAN  Patient Instructions     1. Chronic left shoulder pain    2. Biceps tendinitis of left shoulder    3. Subacromial bursitis of left shoulder joint      - Patient has chronic left shoulder pain due to biceps tendinitis and subacromial bursitis  -Xrays taken in office today show no acute fracture, dislocation or significant degenerative osseous abnormalities  -Patient will start formal physical therapy and home exercise program  -Patient will start diclofenac 75 milligrams twice a day as needed for pain.  Patient to start Flexeril 10 mg at bedtime for nighttime pain  -We discussed potential cortisone injection if pain does not improve  -Patient will continue to follow-up with me for further treatment and recommendations.    -Call direct clinic number [[phone]] at any time with questions or concerns.    Albert Yeo MD [facility] Orth[facility] Medicine  [facility]        -----    SUBJECTIVE  Corinna Barker is a/an 54 year old Right handed female who is seen as a self referral for evaluation of left shoulder pain. The patient is seen by themselves.    Onset: 6 month(s) ago. Reports insidious onset without acute precipitating event.  Location of Pain: left shoulder anterior and posterior  Rating of Pain at worst: 7/10  Rating of Pain Currently: 2/10  Worsened by: lift, reaching ,any weight in the arm, driving, putting coat on  Better with: nothing  Treatments tried: ice, Tylenol, home exercises, and chiropractic care  Associated symptoms: weakness of shoulder,hand is cold  Orthopedic history: YES - Date: right shoulder 2024  Relevant surgical history: NO  Social history: social history: works at machine  shop    Past Medical History:   Diagnosis Date     Hypertension      Social History     Socioeconomic History     Marital status:    Tobacco Use     Smoking status: Light Smoker     Current packs/day: 0.00     Types: Cigarettes     Passive exposure: Current     Smokeless tobacco: Never     Tobacco comments:     1 pack a week   Vaping Use     Vaping status: Never Used   Substance and Sexual Activity     Alcohol use: Yes     Drug use: No     Sexual activity: Yes     Partners: Male     Birth control/protection: None   Other Topics Concern     Parent/sibling w/ CABG, MI or angioplasty before 65F 55M? No     Social Drivers of Health     Financial Resource Strain: Low Risk  (9/28/2024)    Financial Resource Strain      Within the past 12 months, have you or your family members you live with been unable to get utilities (heat, electricity) when it was really needed?: No   Food Insecurity: Low Risk  (9/28/2024)    Food Insecurity      Within the past 12 months, did you worry that your food would run out before you got money to buy more?: No      Within the past 12 months, did the food you bought just not last and you didn t have money to get more?: No   Transportation Needs: Low Risk  (9/28/2024)    Transportation Needs      Within the past 12 months, has lack of transportation kept you from medical appointments, getting your medicines, non-medical meetings or appointments, work, or from getting things that you need?: No   Physical Activity: Insufficiently Active (9/28/2024)    Exercise Vital Sign      Days of Exercise per Week: 1 day      Minutes of Exercise per Session: 10 min   Stress: No Stress Concern Present (9/28/2024)    [employer] [place] [employer] - Occupational Stress Questionnaire      Feeling of Stress : Only a little   Social Connections: Unknown (9/28/2024)    Social Connection and Isolation Panel [NHANES]      Frequency of Social Gatherings with Friends and Family: Once a week    Interpersonal Safety: Low Risk  (4/14/2025)    Interpersonal Safety      Do you feel physically and emotionally safe where you currently live?: Yes      Within the past 12 months, have you been hit, slapped, kicked or otherwise physically hurt by someone?: No      Within the past 12 months, have you been humiliated or emotionally abused in other ways by your partner or ex-partner?: No   Housing Stability: Low Risk  (9/28/2024)    Housing Stability      Do you have housing? : Yes      Are you worried about losing your housing?: No         Patient's past medical, surgical, social, and family histories were reviewed today and no changes are noted.    REVIEW OF SYSTEMS:  10 point ROS is negative other than symptoms noted above in HPI, Past Medical History or as stated below  Constitutional: NEGATIVE for fever, chills, change in weight  Skin: NEGATIVE for worrisome rashes, moles or lesions  GI/: NEGATIVE for bowel or bladder changes  Neuro: NEGATIVE for weakness, dizziness or paresthesias    OBJECTIVE:  There were no vitals taken for this visit.   General: healthy, alert and in no distress  HEENT: no scleral icterus or conjunctival erythema  Skin: no suspicious lesions or rash. No jaundice.  CV: regular rhythm by palpation  Resp: normal respiratory effort without conversational dyspnea   Psych: normal mood and affect  Gait: normal steady gait with appropriate coordination and balance  Neuro: normal light touch sensory exam of the bilateral upper extremities.    MSK:  LEFT SHOULDER  Inspection:    no swelling, bruising, discoloration, or obvious deformity or asymmetry  Palpation:    Tender about the proximal biceps tendon. Remainder of bony and tendinous landmarks are nontender.  Active Range of Motion:     Abduction normal0, FF normal0, ER normal0, IR L4.    Strength:    Scapular plane abduction painful,  ER grossly intact, IR grossly intact, biceps not tested, triceps not tested  Special Tests:    Positive: Neer's,  Mcdermott', crossed arm adduction, [place]'s, and Speed's    Negative: supraspinatus (empty can), drop arm/painful arc, and Yergason's      Independent visualization of the below image:  No results found for this or any previous visit (from the past 24 hours).    Personal review of x-rays taken off today show no acute fracture, dislocation bone spurs or significant joint space narrowing.    Albert Yeo MD [facility]      Again, thank you for allowing me to participate in the care of your patient.        Sincerely,        Albert Yeo, MD    Electronically signed

## 2025-04-21 DIAGNOSIS — I10 ESSENTIAL HYPERTENSION: ICD-10-CM

## 2025-04-22 RX ORDER — IRBESARTAN 300 MG/1
300 TABLET ORAL AT BEDTIME
Qty: 90 TABLET | Refills: 1 | Status: SHIPPED | OUTPATIENT
Start: 2025-04-22

## 2025-04-23 ENCOUNTER — ALLIED HEALTH/NURSE VISIT (OUTPATIENT)
Dept: FAMILY MEDICINE | Facility: CLINIC | Age: 55
End: 2025-04-23
Payer: COMMERCIAL

## 2025-04-23 VITALS
HEART RATE: 104 BPM | OXYGEN SATURATION: 97 % | RESPIRATION RATE: 16 BRPM | SYSTOLIC BLOOD PRESSURE: 130 MMHG | DIASTOLIC BLOOD PRESSURE: 84 MMHG

## 2025-04-23 DIAGNOSIS — Z01.30 BLOOD PRESSURE CHECK: Primary | ICD-10-CM

## 2025-04-23 PROCEDURE — 3079F DIAST BP 80-89 MM HG: CPT

## 2025-04-23 PROCEDURE — 99207 PR NO CHARGE NURSE ONLY: CPT

## 2025-04-23 PROCEDURE — 3075F SYST BP GE 130 - 139MM HG: CPT

## 2025-04-23 NOTE — PROGRESS NOTES
Corinna Barker is a 54 year old patient who comes in today for a Blood Pressure check.  Initial BP:  /84   Pulse 104   Resp 16   SpO2 97%      Increased dose of amlodipine at LOV. Pt is tolerating well.  Disposition: follow-up as previously indicated by provider and results routed to provide    Lashell ANN     Unknown if ever smoked

## 2025-05-07 ENCOUNTER — THERAPY VISIT (OUTPATIENT)
Dept: PHYSICAL THERAPY | Facility: CLINIC | Age: 55
End: 2025-05-07
Payer: COMMERCIAL

## 2025-05-07 DIAGNOSIS — M75.22 BICEPS TENDONITIS, LEFT: Primary | ICD-10-CM

## 2025-05-07 PROCEDURE — 97112 NEUROMUSCULAR REEDUCATION: CPT | Mod: GP | Performed by: PHYSICAL THERAPIST

## 2025-05-07 PROCEDURE — 97161 PT EVAL LOW COMPLEX 20 MIN: CPT | Mod: GP | Performed by: PHYSICAL THERAPIST

## 2025-05-07 PROCEDURE — 97110 THERAPEUTIC EXERCISES: CPT | Mod: GP | Performed by: PHYSICAL THERAPIST

## 2025-05-07 ASSESSMENT — ACTIVITIES OF DAILY LIVING (ADL)
REACHING_FOR_SOMETHING_ON_A_HIGH_SHELF: 7
PUTTING_ON_A_SHIRT_THAT_BUTTONS_DOWN_THE_FRONT: 3
PUSHING_WITH_THE_INVOLVED_ARM: 2
PUTTING_ON_YOUR_PANTS: 3
PLACING_AN_OBJECT_ON_A_HIGH_SHELF: 6
PLEASE_INDICATE_YOR_PRIMARY_REASON_FOR_REFERRAL_TO_THERAPY:: SHOULDER
TOUCHING_THE_BACK_OF_YOUR_NECK: 2
REMOVING_SOMETHING_FROM_YOUR_BACK_POCKET: 3
PUTTING_ON_AN_UNDERSHIRT_OR_A_PULLOVER_SWEATER: 5
WASHING_YOUR_BACK: 3
CARRYING_A_HEAVY_OBJECT_OF_10_POUNDS: 7
WHEN_LYING_ON_THE_INVOLVED_SIDE: 3
AT_ITS_WORST?: 6
WASHING_YOUR_HAIR?: 3

## 2025-05-07 NOTE — PROGRESS NOTES
PHYSICAL THERAPY EVALUATION  Type of Visit: Evaluation       Fall Risk Screen:  Have you fallen 2 or more times in the past year?: No  Have you fallen and had an injury in the past year?: No    Subjective         Presenting condition or subjective complaint: Shoulder pain  Date of onset:      Relevant medical history:     Dates & types of surgery:      Prior diagnostic imaging/testing results: X-ray     Prior therapy history for the same diagnosis, illness or injury: No      Prior Level of Function  Transfers: Independent  Ambulation: Independent  ADL: Independent    Living Environment  Social support: With a significant other or spouse   Type of home: House   Stairs to enter the home: No       Ramp: No   Stairs inside the home: Yes 16 Is there a railing: Yes     Help at home: None  Equipment owned:       Employment: Yes    Hobbies/Interests:      Patient goals for therapy: Put my arm up    SUBJECTIVE:    Employment status: full time  Work Mechanical stresses: repetitive assembly  Leisure Mechanical stresses: colleen painting  Hand Dominance: R  Functional disability from present episode: pain with reaching, lifting with L arm  Functional disability score: see SPADI  Visual Analog Score (VAS 0-10): 7-8/10 with reaching    HISTORY:    Present symptoms: patient reports pain located in the L front shoulder, without radiation.  Type of pain is described as sharp and aching and frequency of pain is described as intermittent.   Associated symptoms include: none  Present since: chronic, most recently seen by Dr. Yeo April 16, 2025 with symptoms worsening.  This condition is chronic.  Symptoms commenced as a result of: no apparent reason   Condition occurred in the following environment: home/wrok   Symptoms at onset: L anterior shoulder  Spinal history: has history of cervical radic  Constant symptoms: none  Intermittent symptoms: all sx    (With consideration for bending, sitting, turning neck, dressing, reaching,  gripping, time of day, when still/on the move, sleep postures, other)  Symptoms are worse with the following: lifting reaching overhead/out to the side, carrying weight in L arm, sleeping on R side   Symptoms are better with the following: nothing    Continued use affects pain in the following way: worsens  Presence of pain at rest: no Site of pain at rest: L shoulder  Sleep disturbance: sometimes sleep is disturbed   Previous episodes: chronic  Previous treatments: neck treatments  Improvement with previous treatments:  for neck     Specific Questions: (as reported by the patient)  Do you have pain with coughing/sneezing: neg  Do you experience parasthesia: occasionally L hand  Patient describes his/her general health as: good  Imaging/special testing: xrays  Recent or major surgery includes the following: no  Do you have night pain: no  Have you had any recent accidents: no  Have you experienced any unexplained weight loss: none  Pertinent medical history includes: see above  Medical allergies include: n/a  Current medications: none  Barriers at home: none  Other red flags: none    Sites for physical examination: L shoulder    OBJECTIVE EXAMINATION:    POSTURE:    Sitting: fair  Correction of Posture: no effect  Standing Posture: forward shoulders, elevated scap    NEUROLOGICAL (motor/sensory/reflex/dural; if applicable): bilat ULNTT L>R    BASELINES: (pain or functional activity): reaching overhead out to the side    EXTREMITIES: (Shoulder / Elbow / Wrist / Hand): L shoulder    MOVEMENT LOSS  Seamus Mod Min  Nil Pain   Flexion   x  pdm   Extension  x x     Supination        Pronation        Adduction/  Ulnar Deviation   x  pdm   Abduction /  Radial Deviation  x   pdm   IR (Ext/IR)   xT11     ER    x                      Passive Movement (note with/without overpressure, symptom and range, PDM/ERP): ABD=min to nil loss, Flex=min loss erp, ER=wnl, IR wnl  Resisted Test Response (pain): ER=5/5, IR=5/5, flex=4/5  painful, ABD=5/5  Other Tests: +impingement    SPINE  Movement loss:   Effect of repeated movements:   Effect of static positioning:   Spine testing (relevant/not relevant/secondary problem):     Baseline Symptoms:   Repeated Tests Symptom Response Mechanical Response   Active/Passive movement, resisted test, functional test During - Produce, Abolish, Increase, Decrease, NE After - Better, Worse, NB, NW, NE Effect - ? or ? ROM, strength or key functional test No Effect                               Effect of static positioning                  Provisional Classification: other  Extremity or Spine: L shoulder  Principle of Management (education/equipment/mechanical therapy/specific principle): mobility/strengthening; scap strengthening/NMR      Assessment & Plan   CLINICAL IMPRESSIONS  Medical Diagnosis: Biceps tendinitis of left shoulder  Subacromial bursitis of left shoulder joint    Treatment Diagnosis: left shoulder pain with strength and mobility deficits   Impression/Assessment: Patient is a 54 year old female with L shoulder complaints.  The following significant findings have been identified: Pain, Decreased ROM/flexibility, Decreased strength, Impaired muscle performance, and Decreased activity tolerance. These impairments interfere with their ability to perform self care tasks, recreational activities, and household chores as compared to previous level of function.     Clinical Decision Making (Complexity):  Clinical Presentation: Stable/Uncomplicated  Clinical Presentation Rationale: based on medical and personal factors listed in PT evaluation  Clinical Decision Making (Complexity): Low complexity    PLAN OF CARE  Treatment Interventions:  Interventions: Manual Therapy, Neuromuscular Re-education, Therapeutic Activity, Therapeutic Exercise    Long Term Goals     PT Goal 1  Goal Identifier: reaching  Goal Description: able to reach overhead, out to the side, behind back full ROM  Rationale: to maximize  safety and independence with performance of ADLs and functional tasks;to maximize safety and independence with self cares;to maximize safety and independence with transportation  Target Date: 07/02/25      Frequency of Treatment: 1 x week  Duration of Treatment: 8 weeks    Recommended Referrals to Other Professionals:   Education Assessment:   Learner/Method: Patient;No Barriers to Learning;Pictures/Video    Risks and benefits of evaluation/treatment have been explained.   Patient/Family/caregiver agrees with Plan of Care.     Evaluation Time:     PT Eval, Low Complexity Minutes (97926): 15       Signing Clinician: Valeria Batres PT

## 2025-05-13 DIAGNOSIS — M75.22 BICEPS TENDINITIS OF LEFT SHOULDER: ICD-10-CM

## 2025-05-13 DIAGNOSIS — M75.52 SUBACROMIAL BURSITIS OF LEFT SHOULDER JOINT: ICD-10-CM

## 2025-05-13 RX ORDER — DICLOFENAC SODIUM 75 MG/1
TABLET, DELAYED RELEASE ORAL
Qty: 60 TABLET | Refills: 0 | OUTPATIENT
Start: 2025-05-13

## 2025-05-13 RX ORDER — CYCLOBENZAPRINE HCL 10 MG
10 TABLET ORAL
Qty: 30 TABLET | Refills: 0 | OUTPATIENT
Start: 2025-05-13

## 2025-05-13 NOTE — TELEPHONE ENCOUNTER
Medication Request for: Diclofenac 75 mg          Prescription last written on 4/16/25 by Dr. Yeo   Sig: take 1 tab twice daily prn    Last Fill Quantity: 60 with # refills: 0     Last Office Visit by provider: 4/16/25    Medication Request for: Cyclobenzaprine 10 mg      Prescription last written on 4/16/25 by Dr. Yeo   Sig: take 1 tab nightly prn     Patient has attended one physical therapy appointment.     [place] [place] [employer] message sent to patient.     SILVIA Mason RN

## 2025-05-14 ENCOUNTER — THERAPY VISIT (OUTPATIENT)
Dept: PHYSICAL THERAPY | Facility: CLINIC | Age: 55
End: 2025-05-14
Attending: FAMILY MEDICINE
Payer: COMMERCIAL

## 2025-05-14 DIAGNOSIS — M75.22 BICEPS TENDONITIS, LEFT: Primary | ICD-10-CM

## 2025-05-14 PROCEDURE — 97112 NEUROMUSCULAR REEDUCATION: CPT | Mod: GP | Performed by: PHYSICAL THERAPIST

## 2025-05-14 PROCEDURE — 97140 MANUAL THERAPY 1/> REGIONS: CPT | Mod: GP | Performed by: PHYSICAL THERAPIST

## 2025-05-21 ENCOUNTER — THERAPY VISIT (OUTPATIENT)
Dept: PHYSICAL THERAPY | Facility: CLINIC | Age: 55
End: 2025-05-21
Attending: FAMILY MEDICINE
Payer: COMMERCIAL

## 2025-05-21 DIAGNOSIS — M75.22 BICEPS TENDONITIS, LEFT: Primary | ICD-10-CM

## 2025-05-21 PROCEDURE — 97112 NEUROMUSCULAR REEDUCATION: CPT | Mod: GP | Performed by: PHYSICAL THERAPIST

## 2025-05-21 PROCEDURE — 97140 MANUAL THERAPY 1/> REGIONS: CPT | Mod: GP | Performed by: PHYSICAL THERAPIST

## 2025-05-22 SDOH — HEALTH STABILITY: PHYSICAL HEALTH: ON AVERAGE, HOW MANY DAYS PER WEEK DO YOU ENGAGE IN MODERATE TO STRENUOUS EXERCISE (LIKE A BRISK WALK)?: 1 DAY

## 2025-05-27 ENCOUNTER — OFFICE VISIT (OUTPATIENT)
Dept: ORTHOPEDICS | Facility: CLINIC | Age: 55
End: 2025-05-27
Payer: COMMERCIAL

## 2025-05-27 DIAGNOSIS — M75.22 BICEPS TENDINITIS OF LEFT SHOULDER: Primary | ICD-10-CM

## 2025-05-27 DIAGNOSIS — M75.52 SUBACROMIAL BURSITIS OF LEFT SHOULDER JOINT: ICD-10-CM

## 2025-05-27 PROCEDURE — 76942 ECHO GUIDE FOR BIOPSY: CPT | Mod: LT | Performed by: FAMILY MEDICINE

## 2025-05-27 PROCEDURE — 20550 NJX 1 TENDON SHEATH/LIGAMENT: CPT | Mod: 51 | Performed by: FAMILY MEDICINE

## 2025-05-27 PROCEDURE — 20611 DRAIN/INJ JOINT/BURSA W/US: CPT | Mod: LT | Performed by: FAMILY MEDICINE

## 2025-05-27 RX ORDER — METHYLPREDNISOLONE ACETATE 40 MG/ML
40 INJECTION, SUSPENSION INTRA-ARTICULAR; INTRALESIONAL; INTRAMUSCULAR; SOFT TISSUE
Status: COMPLETED | OUTPATIENT
Start: 2025-05-27 | End: 2025-05-27

## 2025-05-27 RX ORDER — LIDOCAINE HYDROCHLORIDE 10 MG/ML
1 INJECTION, SOLUTION EPIDURAL; INFILTRATION; INTRACAUDAL; PERINEURAL
Status: COMPLETED | OUTPATIENT
Start: 2025-05-27 | End: 2025-05-27

## 2025-05-27 RX ORDER — ROPIVACAINE HYDROCHLORIDE 5 MG/ML
4 INJECTION, SOLUTION EPIDURAL; INFILTRATION; PERINEURAL
Status: COMPLETED | OUTPATIENT
Start: 2025-05-27 | End: 2025-05-27

## 2025-05-27 RX ORDER — LIDOCAINE HYDROCHLORIDE 10 MG/ML
3 INJECTION, SOLUTION EPIDURAL; INFILTRATION; INTRACAUDAL; PERINEURAL
Status: COMPLETED | OUTPATIENT
Start: 2025-05-27 | End: 2025-05-27

## 2025-05-27 RX ADMIN — METHYLPREDNISOLONE ACETATE 40 MG: 40 INJECTION, SUSPENSION INTRA-ARTICULAR; INTRALESIONAL; INTRAMUSCULAR; SOFT TISSUE at 14:51

## 2025-05-27 RX ADMIN — ROPIVACAINE HYDROCHLORIDE 4 ML: 5 INJECTION, SOLUTION EPIDURAL; INFILTRATION; PERINEURAL at 14:51

## 2025-05-27 RX ADMIN — METHYLPREDNISOLONE ACETATE 40 MG: 40 INJECTION, SUSPENSION INTRA-ARTICULAR; INTRALESIONAL; INTRAMUSCULAR; SOFT TISSUE at 14:52

## 2025-05-27 RX ADMIN — LIDOCAINE HYDROCHLORIDE 1 ML: 10 INJECTION, SOLUTION EPIDURAL; INFILTRATION; INTRACAUDAL; PERINEURAL at 14:52

## 2025-05-27 RX ADMIN — LIDOCAINE HYDROCHLORIDE 3 ML: 10 INJECTION, SOLUTION EPIDURAL; INFILTRATION; INTRACAUDAL; PERINEURAL at 14:51

## 2025-05-27 NOTE — PATIENT INSTRUCTIONS
1. Biceps tendinitis of left shoulder    2. Subacromial bursitis of left shoulder joint      - Patient is following up for chronic left shoulder pain due to biceps tendinitis and bursitis  -Patient tolerated left biceps tendon sheath and subacromial cortisone injection today without complications.  Patient was given postprocedure instruction  -Patient will continue with therapy and home exercise program  -Patient will follow-up if pain returns  -Call direct clinic number [[phone]] at any time with questions or concerns.    Albert Yeo MD CAQSM  [facility]  [facility] [place]

## 2025-05-27 NOTE — PROGRESS NOTES
ASSESSMENT & PLAN  Patient Instructions     1. Biceps tendinitis of left shoulder    2. Subacromial bursitis of left shoulder joint      - Patient is following up for chronic left shoulder pain due to biceps tendinitis and bursitis  -Patient tolerated left biceps tendon sheath and subacromial cortisone injection today without complications.  Patient was given postprocedure instruction  -Patient will continue with therapy and home exercise program  -Patient will follow-up if pain returns  -Call direct clinic number [[phone]] at any time with questions or concerns.    Albert Yeo MD [facility]  [facility]        -----    SUBJECTIVE:  Corinna Barker is a 54 year old female who is seen for left shoulder injection.Patient was last seen on 04/16/25 she has been doing physical therapy and it was recommended by her PT to return to clinic for a cortisone injection        Large  Injection: L subacromial bursa    Date/Time: 5/27/2025 2:51 PM    Performed by: Yeo, Albert, MD  Authorized by: Yeo, Albert, MD    Indications:  Pain  Needle Size:  25 G  Guidance: ultrasound    Approach:  Anterior  Location:  Shoulder      Site:  L subacromial bursa  Medications:  40 mg methylPREDNISolone 40 MG/ML; 3 mL lidocaine (PF) 1 %; 4 mL ROPivacaine 5 MG/ML  Outcome:  Tolerated well, no immediate complications  Procedure discussed: discussed risks, benefits, and alternatives    Consent Given by:  Patient  Timeout: timeout called immediately prior to procedure    Prep: patient was prepped and draped in usual sterile fashion     Ultrasound was used to ensure safe and accurate needle placement and injection. Ultrasound images of the procedure were permanently stored.    Large left shoulder biceps tendon sheath Injection    Date/Time: 5/27/2025 2:52 PM    Performed by: Yeo, Albert, MD  Authorized by: Yeo, Albert, MD    Indications:  Pain  Needle Size:  25 G  Guidance: ultrasound     Approach:  Anterior  Location:  Shoulder   Location comment:  Biceps tendon sheath       Medications:  1 mL lidocaine (PF) 1 %; 40 mg methylPREDNISolone 40 MG/ML  Outcome:  Tolerated well, no immediate complications  Procedure discussed: discussed risks, benefits, and alternatives    Consent Given by:  Patient  Timeout: timeout called immediately prior to procedure    Prep: patient was prepped and draped in usual sterile fashion        Albert Yeo MD, [facility]

## 2025-05-27 NOTE — LETTER
5/27/2025      Corinna Barker  [address] [facility] [address]      Dear Colleague,    Thank you for referring your patient, Corinna Barker, to the [facility] [place]. Please see a copy of my visit note below.    ASSESSMENT & PLAN  Patient Instructions     1. Biceps tendinitis of left shoulder    2. Subacromial bursitis of left shoulder joint      - Patient is following up for chronic left shoulder pain due to biceps tendinitis and bursitis  -Patient tolerated left biceps tendon sheath and subacromial cortisone injection today without complications.  Patient was given postprocedure instruction  -Patient will continue with therapy and home exercise program  -Patient will follow-up if pain returns  -Call direct clinic number [[phone]] at any time with questions or concerns.    Albert Yeo MD [facility]  [facility]        -----    SUBJECTIVE:  Corinna Barker is a 54 year old female who is seen for left shoulder injection.Patient was last seen on 04/16/25 she has been doing physical therapy and it was recommended by her PT to return to clinic for a cortisone injection        Large  Injection: L subacromial bursa    Date/Time: 5/27/2025 2:51 PM    Performed by: Yeo, Albert, MD  Authorized by: Yeo, Albert, MD    Indications:  Pain  Needle Size:  25 G  Guidance: ultrasound    Approach:  Anterior  Location:  Shoulder      Site:  L subacromial bursa  Medications:  40 mg methylPREDNISolone 40 MG/ML; 3 mL lidocaine (PF) 1 %; 4 mL ROPivacaine 5 MG/ML  Outcome:  Tolerated well, no immediate complications  Procedure discussed: discussed risks, benefits, and alternatives    Consent Given by:  Patient  Timeout: timeout called immediately prior to procedure    Prep: patient was prepped and draped in usual sterile fashion     Ultrasound was used to ensure safe and accurate needle placement and injection. Ultrasound images of  the procedure were permanently stored.    Large left shoulder biceps tendon sheath Injection    Date/Time: 5/27/2025 2:52 PM    Performed by: Yeo, Albert, MD  Authorized by: Yeo, Albert, MD    Indications:  Pain  Needle Size:  25 G  Guidance: ultrasound    Approach:  Anterior  Location:  Shoulder   Location comment:  Biceps tendon sheath       Medications:  1 mL lidocaine (PF) 1 %; 40 mg methylPREDNISolone 40 MG/ML  Outcome:  Tolerated well, no immediate complications  Procedure discussed: discussed risks, benefits, and alternatives    Consent Given by:  Patient  Timeout: timeout called immediately prior to procedure    Prep: patient was prepped and draped in usual sterile fashion        Albert Yeo MD, [facility]      Again, thank you for allowing me to participate in the care of your patient.        Sincerely,        Albert Yeo, MD    Electronically signed

## 2025-07-10 DIAGNOSIS — I10 ESSENTIAL HYPERTENSION: ICD-10-CM

## 2025-07-10 RX ORDER — AMLODIPINE BESYLATE 10 MG/1
10 TABLET ORAL DAILY
Qty: 90 TABLET | Refills: 2 | Status: SHIPPED | OUTPATIENT
Start: 2025-07-10

## 2025-08-04 PROBLEM — M75.22 BICEPS TENDONITIS, LEFT: Status: RESOLVED | Noted: 2025-05-07 | Resolved: 2025-08-04

## 2025-09-03 ENCOUNTER — PATIENT OUTREACH (OUTPATIENT)
Dept: CARE COORDINATION | Facility: CLINIC | Age: 55
End: 2025-09-03
Payer: COMMERCIAL